# Patient Record
Sex: FEMALE | Race: WHITE | NOT HISPANIC OR LATINO | Employment: FULL TIME | URBAN - METROPOLITAN AREA
[De-identification: names, ages, dates, MRNs, and addresses within clinical notes are randomized per-mention and may not be internally consistent; named-entity substitution may affect disease eponyms.]

---

## 2017-11-08 LAB
EXTERNAL HIV SCREEN: NORMAL
HCV AB SER-ACNC: NEGATIVE

## 2017-12-15 ENCOUNTER — APPOINTMENT (OUTPATIENT)
Dept: PHYSICAL THERAPY | Facility: CLINIC | Age: 42
End: 2017-12-15
Payer: COMMERCIAL

## 2017-12-15 PROCEDURE — 97161 PT EVAL LOW COMPLEX 20 MIN: CPT

## 2017-12-15 PROCEDURE — G8991 OTHER PT/OT GOAL STATUS: HCPCS

## 2017-12-15 PROCEDURE — G8990 OTHER PT/OT CURRENT STATUS: HCPCS

## 2017-12-20 ENCOUNTER — APPOINTMENT (OUTPATIENT)
Dept: PHYSICAL THERAPY | Facility: CLINIC | Age: 42
End: 2017-12-20
Payer: COMMERCIAL

## 2017-12-20 PROCEDURE — 97140 MANUAL THERAPY 1/> REGIONS: CPT

## 2017-12-20 PROCEDURE — 97110 THERAPEUTIC EXERCISES: CPT

## 2017-12-22 ENCOUNTER — APPOINTMENT (OUTPATIENT)
Dept: PHYSICAL THERAPY | Facility: CLINIC | Age: 42
End: 2017-12-22
Payer: COMMERCIAL

## 2017-12-22 PROCEDURE — 97140 MANUAL THERAPY 1/> REGIONS: CPT

## 2017-12-22 PROCEDURE — 97110 THERAPEUTIC EXERCISES: CPT

## 2017-12-27 ENCOUNTER — APPOINTMENT (OUTPATIENT)
Dept: PHYSICAL THERAPY | Facility: CLINIC | Age: 42
End: 2017-12-27
Payer: COMMERCIAL

## 2017-12-27 PROCEDURE — 97110 THERAPEUTIC EXERCISES: CPT

## 2017-12-27 PROCEDURE — 97140 MANUAL THERAPY 1/> REGIONS: CPT

## 2017-12-28 ENCOUNTER — APPOINTMENT (OUTPATIENT)
Dept: PHYSICAL THERAPY | Facility: CLINIC | Age: 42
End: 2017-12-28
Payer: COMMERCIAL

## 2017-12-28 PROCEDURE — 97110 THERAPEUTIC EXERCISES: CPT

## 2017-12-28 PROCEDURE — 97140 MANUAL THERAPY 1/> REGIONS: CPT

## 2017-12-28 PROCEDURE — G8992 OTHER PT/OT  D/C STATUS: HCPCS

## 2017-12-28 PROCEDURE — G8991 OTHER PT/OT GOAL STATUS: HCPCS

## 2019-05-15 PROBLEM — G45.9 TIA (TRANSIENT ISCHEMIC ATTACK): Status: ACTIVE | Noted: 2017-04-12

## 2019-05-15 PROBLEM — Z86.73 H/O TIA (TRANSIENT ISCHEMIC ATTACK) AND STROKE: Status: ACTIVE | Noted: 2017-04-20

## 2019-05-15 PROBLEM — F41.9 ANXIETY: Status: ACTIVE | Noted: 2017-11-27

## 2019-05-16 RX ORDER — ATORVASTATIN CALCIUM 10 MG/1
5 TABLET, FILM COATED ORAL
COMMUNITY
Start: 2018-11-30 | End: 2019-05-17 | Stop reason: SDUPTHER

## 2019-05-16 RX ORDER — FERROUS SULFATE 325(65) MG
325 TABLET ORAL EVERY OTHER DAY
COMMUNITY

## 2019-05-16 RX ORDER — ACETAMINOPHEN 500 MG
500 TABLET ORAL EVERY 6 HOURS PRN
COMMUNITY
Start: 2017-04-27

## 2019-05-16 RX ORDER — MAGNESIUM GLUCONATE 30 MG(550)
550 TABLET ORAL DAILY
COMMUNITY

## 2019-05-16 RX ORDER — CHLORAL HYDRATE 500 MG
1000 CAPSULE ORAL DAILY
COMMUNITY

## 2019-05-16 RX ORDER — ANTACID TABLETS 648 MG/1
648 TABLET, CHEWABLE ORAL DAILY
COMMUNITY

## 2019-05-16 RX ORDER — DICYCLOMINE HCL 20 MG
20 TABLET ORAL EVERY 6 HOURS PRN
COMMUNITY
Start: 2018-05-18 | End: 2022-01-14

## 2019-05-16 RX ORDER — LANOLIN ALCOHOL/MO/W.PET/CERES
1000 CREAM (GRAM) TOPICAL DAILY
COMMUNITY
End: 2019-11-22

## 2019-05-16 RX ORDER — CETIRIZINE HYDROCHLORIDE 10 MG/1
10 TABLET ORAL DAILY PRN
COMMUNITY

## 2019-05-16 RX ORDER — ASCORBIC ACID 500 MG
500 TABLET ORAL DAILY
COMMUNITY

## 2019-05-16 RX ORDER — MULTIVITAMIN WITH FOLIC ACID 400 MCG
1 TABLET ORAL DAILY
COMMUNITY

## 2019-05-17 ENCOUNTER — OFFICE VISIT (OUTPATIENT)
Dept: FAMILY MEDICINE CLINIC | Facility: CLINIC | Age: 44
End: 2019-05-17
Payer: COMMERCIAL

## 2019-05-17 VITALS
DIASTOLIC BLOOD PRESSURE: 82 MMHG | OXYGEN SATURATION: 99 % | WEIGHT: 157 LBS | SYSTOLIC BLOOD PRESSURE: 128 MMHG | HEART RATE: 92 BPM | HEIGHT: 64 IN | BODY MASS INDEX: 26.8 KG/M2 | RESPIRATION RATE: 18 BRPM | TEMPERATURE: 98.5 F

## 2019-05-17 DIAGNOSIS — Z86.73 H/O TIA (TRANSIENT ISCHEMIC ATTACK) AND STROKE: ICD-10-CM

## 2019-05-17 DIAGNOSIS — F41.9 ANXIETY: ICD-10-CM

## 2019-05-17 DIAGNOSIS — J30.9 ALLERGIC RHINITIS, UNSPECIFIED SEASONALITY, UNSPECIFIED TRIGGER: ICD-10-CM

## 2019-05-17 DIAGNOSIS — T45.8X5A: ICD-10-CM

## 2019-05-17 DIAGNOSIS — M54.2 NECK PAIN: ICD-10-CM

## 2019-05-17 DIAGNOSIS — R74.8 ELEVATED VITAMIN B12 LEVEL: ICD-10-CM

## 2019-05-17 DIAGNOSIS — Z13.1 DIABETES MELLITUS SCREENING: ICD-10-CM

## 2019-05-17 DIAGNOSIS — M79.10 MYALGIA: ICD-10-CM

## 2019-05-17 DIAGNOSIS — L60.8 DISCOLORED NAILS: Primary | ICD-10-CM

## 2019-05-17 PROBLEM — G45.9 TIA (TRANSIENT ISCHEMIC ATTACK): Status: RESOLVED | Noted: 2017-04-12 | Resolved: 2019-05-17

## 2019-05-17 PROCEDURE — 1036F TOBACCO NON-USER: CPT | Performed by: FAMILY MEDICINE

## 2019-05-17 PROCEDURE — 99214 OFFICE O/P EST MOD 30 MIN: CPT | Performed by: FAMILY MEDICINE

## 2019-05-17 PROCEDURE — 3008F BODY MASS INDEX DOCD: CPT | Performed by: FAMILY MEDICINE

## 2019-05-17 RX ORDER — ATORVASTATIN CALCIUM 10 MG/1
5 TABLET, FILM COATED ORAL
Qty: 90 TABLET | Refills: 1 | Status: SHIPPED | OUTPATIENT
Start: 2019-05-17 | End: 2019-11-22 | Stop reason: SDUPTHER

## 2019-05-31 ENCOUNTER — LAB (OUTPATIENT)
Dept: LAB | Facility: CLINIC | Age: 44
End: 2019-05-31
Payer: COMMERCIAL

## 2019-05-31 DIAGNOSIS — F41.9 ANXIETY: ICD-10-CM

## 2019-05-31 DIAGNOSIS — Z86.73 H/O TIA (TRANSIENT ISCHEMIC ATTACK) AND STROKE: ICD-10-CM

## 2019-05-31 DIAGNOSIS — L60.8 DISCOLORED NAILS: ICD-10-CM

## 2019-05-31 LAB
ALBUMIN SERPL BCP-MCNC: 4.1 G/DL (ref 3.5–5)
ALP SERPL-CCNC: 48 U/L (ref 46–116)
ALT SERPL W P-5'-P-CCNC: 47 U/L (ref 12–78)
ANION GAP SERPL CALCULATED.3IONS-SCNC: 8 MMOL/L (ref 4–13)
AST SERPL W P-5'-P-CCNC: 36 U/L (ref 5–45)
BASOPHILS # BLD AUTO: 0.05 THOUSANDS/ΜL (ref 0–0.1)
BASOPHILS NFR BLD AUTO: 1 % (ref 0–1)
BILIRUB SERPL-MCNC: 0.65 MG/DL (ref 0.2–1)
BUN SERPL-MCNC: 13 MG/DL (ref 5–25)
CALCIUM SERPL-MCNC: 8.5 MG/DL (ref 8.3–10.1)
CHLORIDE SERPL-SCNC: 106 MMOL/L (ref 100–108)
CHOLEST SERPL-MCNC: 184 MG/DL (ref 50–200)
CO2 SERPL-SCNC: 26 MMOL/L (ref 21–32)
CREAT SERPL-MCNC: 0.76 MG/DL (ref 0.6–1.3)
EOSINOPHIL # BLD AUTO: 0.07 THOUSAND/ΜL (ref 0–0.61)
EOSINOPHIL NFR BLD AUTO: 1 % (ref 0–6)
ERYTHROCYTE [DISTWIDTH] IN BLOOD BY AUTOMATED COUNT: 11.9 % (ref 11.6–15.1)
FOLATE SERPL-MCNC: >20 NG/ML (ref 3.1–17.5)
GFR SERPL CREATININE-BSD FRML MDRD: 95 ML/MIN/1.73SQ M
GLUCOSE P FAST SERPL-MCNC: 97 MG/DL (ref 65–99)
HCT VFR BLD AUTO: 37.1 % (ref 34.8–46.1)
HDLC SERPL-MCNC: 87 MG/DL (ref 40–60)
HGB BLD-MCNC: 12 G/DL (ref 11.5–15.4)
IMM GRANULOCYTES # BLD AUTO: 0.03 THOUSAND/UL (ref 0–0.2)
IMM GRANULOCYTES NFR BLD AUTO: 1 % (ref 0–2)
LDLC SERPL CALC-MCNC: 82 MG/DL (ref 0–100)
LYMPHOCYTES # BLD AUTO: 1.26 THOUSANDS/ΜL (ref 0.6–4.47)
LYMPHOCYTES NFR BLD AUTO: 25 % (ref 14–44)
MCH RBC QN AUTO: 30.6 PG (ref 26.8–34.3)
MCHC RBC AUTO-ENTMCNC: 32.3 G/DL (ref 31.4–37.4)
MCV RBC AUTO: 95 FL (ref 82–98)
MONOCYTES # BLD AUTO: 0.48 THOUSAND/ΜL (ref 0.17–1.22)
MONOCYTES NFR BLD AUTO: 10 % (ref 4–12)
NEUTROPHILS # BLD AUTO: 3.16 THOUSANDS/ΜL (ref 1.85–7.62)
NEUTS SEG NFR BLD AUTO: 62 % (ref 43–75)
NONHDLC SERPL-MCNC: 97 MG/DL
NRBC BLD AUTO-RTO: 0 /100 WBCS
PLATELET # BLD AUTO: 278 THOUSANDS/UL (ref 149–390)
PMV BLD AUTO: 10.9 FL (ref 8.9–12.7)
POTASSIUM SERPL-SCNC: 4.2 MMOL/L (ref 3.5–5.3)
PROT SERPL-MCNC: 7.3 G/DL (ref 6.4–8.2)
RBC # BLD AUTO: 3.92 MILLION/UL (ref 3.81–5.12)
SODIUM SERPL-SCNC: 140 MMOL/L (ref 136–145)
TRIGL SERPL-MCNC: 73 MG/DL
TSH SERPL DL<=0.05 MIU/L-ACNC: 1.34 UIU/ML (ref 0.36–3.74)
VIT B12 SERPL-MCNC: 1864 PG/ML (ref 100–900)
WBC # BLD AUTO: 5.05 THOUSAND/UL (ref 4.31–10.16)

## 2019-05-31 PROCEDURE — 82746 ASSAY OF FOLIC ACID SERUM: CPT

## 2019-05-31 PROCEDURE — 82607 VITAMIN B-12: CPT

## 2019-05-31 PROCEDURE — 80061 LIPID PANEL: CPT

## 2019-05-31 PROCEDURE — 36415 COLL VENOUS BLD VENIPUNCTURE: CPT

## 2019-05-31 PROCEDURE — 84443 ASSAY THYROID STIM HORMONE: CPT

## 2019-05-31 PROCEDURE — 85025 COMPLETE CBC W/AUTO DIFF WBC: CPT

## 2019-05-31 PROCEDURE — 80053 COMPREHEN METABOLIC PANEL: CPT

## 2019-06-03 ENCOUNTER — TELEPHONE (OUTPATIENT)
Dept: FAMILY MEDICINE CLINIC | Facility: CLINIC | Age: 44
End: 2019-06-03

## 2019-11-06 ENCOUNTER — TELEPHONE (OUTPATIENT)
Dept: FAMILY MEDICINE CLINIC | Facility: CLINIC | Age: 44
End: 2019-11-06

## 2019-11-06 NOTE — TELEPHONE ENCOUNTER
Received text message from patient inquiring about holding Aspirin 81mg daily prior to shoulder cyst removal by Derm  I advised patient by text to contact Neuro Dr Shahzad Hathaway for his guidance

## 2019-11-15 ENCOUNTER — LAB (OUTPATIENT)
Dept: LAB | Facility: CLINIC | Age: 44
End: 2019-11-15
Payer: COMMERCIAL

## 2019-11-15 DIAGNOSIS — F41.9 ANXIETY: ICD-10-CM

## 2019-11-15 DIAGNOSIS — Z86.73 H/O TIA (TRANSIENT ISCHEMIC ATTACK) AND STROKE: ICD-10-CM

## 2019-11-15 DIAGNOSIS — T45.8X5A: ICD-10-CM

## 2019-11-15 DIAGNOSIS — M79.10 MYALGIA: ICD-10-CM

## 2019-11-15 DIAGNOSIS — Z13.1 DIABETES MELLITUS SCREENING: ICD-10-CM

## 2019-11-15 DIAGNOSIS — R74.8 ELEVATED VITAMIN B12 LEVEL: ICD-10-CM

## 2019-11-15 LAB
25(OH)D3 SERPL-MCNC: 75.9 NG/ML (ref 30–100)
ALBUMIN SERPL BCP-MCNC: 4.3 G/DL (ref 3.5–5)
ALP SERPL-CCNC: 35 U/L (ref 46–116)
ALT SERPL W P-5'-P-CCNC: 26 U/L (ref 12–78)
ANION GAP SERPL CALCULATED.3IONS-SCNC: 8 MMOL/L (ref 4–13)
AST SERPL W P-5'-P-CCNC: 14 U/L (ref 5–45)
BASOPHILS # BLD AUTO: 0.04 THOUSANDS/ΜL (ref 0–0.1)
BASOPHILS NFR BLD AUTO: 1 % (ref 0–1)
BILIRUB SERPL-MCNC: 0.43 MG/DL (ref 0.2–1)
BUN SERPL-MCNC: 16 MG/DL (ref 5–25)
CALCIUM SERPL-MCNC: 9.1 MG/DL (ref 8.3–10.1)
CHLORIDE SERPL-SCNC: 109 MMOL/L (ref 100–108)
CHOLEST SERPL-MCNC: 163 MG/DL (ref 50–200)
CO2 SERPL-SCNC: 23 MMOL/L (ref 21–32)
CREAT SERPL-MCNC: 0.76 MG/DL (ref 0.6–1.3)
EOSINOPHIL # BLD AUTO: 0.07 THOUSAND/ΜL (ref 0–0.61)
EOSINOPHIL NFR BLD AUTO: 2 % (ref 0–6)
ERYTHROCYTE [DISTWIDTH] IN BLOOD BY AUTOMATED COUNT: 12 % (ref 11.6–15.1)
EST. AVERAGE GLUCOSE BLD GHB EST-MCNC: 88 MG/DL
FOLATE SERPL-MCNC: 12 NG/ML (ref 3.1–17.5)
GFR SERPL CREATININE-BSD FRML MDRD: 94 ML/MIN/1.73SQ M
GLUCOSE P FAST SERPL-MCNC: 94 MG/DL (ref 65–99)
HBA1C MFR BLD: 4.7 % (ref 4.2–6.3)
HCT VFR BLD AUTO: 37.4 % (ref 34.8–46.1)
HDLC SERPL-MCNC: 69 MG/DL
HGB BLD-MCNC: 11.8 G/DL (ref 11.5–15.4)
IMM GRANULOCYTES # BLD AUTO: 0.01 THOUSAND/UL (ref 0–0.2)
IMM GRANULOCYTES NFR BLD AUTO: 0 % (ref 0–2)
LDLC SERPL CALC-MCNC: 83 MG/DL (ref 0–100)
LYMPHOCYTES # BLD AUTO: 1.6 THOUSANDS/ΜL (ref 0.6–4.47)
LYMPHOCYTES NFR BLD AUTO: 34 % (ref 14–44)
MCH RBC QN AUTO: 30.4 PG (ref 26.8–34.3)
MCHC RBC AUTO-ENTMCNC: 31.6 G/DL (ref 31.4–37.4)
MCV RBC AUTO: 96 FL (ref 82–98)
MONOCYTES # BLD AUTO: 0.45 THOUSAND/ΜL (ref 0.17–1.22)
MONOCYTES NFR BLD AUTO: 10 % (ref 4–12)
NEUTROPHILS # BLD AUTO: 2.52 THOUSANDS/ΜL (ref 1.85–7.62)
NEUTS SEG NFR BLD AUTO: 53 % (ref 43–75)
NONHDLC SERPL-MCNC: 94 MG/DL
NRBC BLD AUTO-RTO: 0 /100 WBCS
PLATELET # BLD AUTO: 244 THOUSANDS/UL (ref 149–390)
PMV BLD AUTO: 11 FL (ref 8.9–12.7)
POTASSIUM SERPL-SCNC: 4.4 MMOL/L (ref 3.5–5.3)
PROT SERPL-MCNC: 7.5 G/DL (ref 6.4–8.2)
RBC # BLD AUTO: 3.88 MILLION/UL (ref 3.81–5.12)
SODIUM SERPL-SCNC: 140 MMOL/L (ref 136–145)
TRIGL SERPL-MCNC: 54 MG/DL
TSH SERPL DL<=0.05 MIU/L-ACNC: 1.37 UIU/ML (ref 0.36–3.74)
VIT B12 SERPL-MCNC: 515 PG/ML (ref 100–900)
WBC # BLD AUTO: 4.69 THOUSAND/UL (ref 4.31–10.16)

## 2019-11-15 PROCEDURE — 82607 VITAMIN B-12: CPT

## 2019-11-15 PROCEDURE — 80061 LIPID PANEL: CPT

## 2019-11-15 PROCEDURE — 83036 HEMOGLOBIN GLYCOSYLATED A1C: CPT

## 2019-11-15 PROCEDURE — 84443 ASSAY THYROID STIM HORMONE: CPT

## 2019-11-15 PROCEDURE — 80053 COMPREHEN METABOLIC PANEL: CPT

## 2019-11-15 PROCEDURE — 36415 COLL VENOUS BLD VENIPUNCTURE: CPT

## 2019-11-15 PROCEDURE — 82746 ASSAY OF FOLIC ACID SERUM: CPT

## 2019-11-15 PROCEDURE — 85025 COMPLETE CBC W/AUTO DIFF WBC: CPT

## 2019-11-15 PROCEDURE — 82306 VITAMIN D 25 HYDROXY: CPT

## 2019-11-21 NOTE — PROGRESS NOTES
Assessment/Plan:  Problem List Items Addressed This Visit        Respiratory    AR (allergic rhinitis)     Stable on Zyrtec PRN  Other    Anxiety     Stable on CBD oil  Start Cymbalta 60mg daily for fibromyalgia  Relevant Orders    CBC and differential    Comprehensive metabolic panel    Neck pain     Worsening  Continue Voltaren Gel PRN if not taking NSAIDs  Relevant Orders    Ambulatory referral to Physical Therapy    Ambulatory referral to Chiropractic    Likely due to OA  H/O TIA (transient ischemic attack) and stroke     Stable  Continue ASA, statin  Patient declines taking higher statin dose than Lipitor 10mg 1/2 pill QHS  Recommend lifestyle modifications  Relevant Medications    atorvastatin (LIPITOR) 10 mg tablet    Other Relevant Orders    Lipid panel    LDL cholesterol, direct    Fibromyalgia - Primary     New Dx 11/22/19  +12/18 tender points  Start Cymbalta 60mg daily for fibromyalgia  Patient declines aqua therapy  Recommend lifestyle modifications             Relevant Medications    DULoxetine (CYMBALTA) 30 mg delayed release capsule    DULoxetine (CYMBALTA) 60 mg delayed release capsule    Other Relevant Orders    Ambulatory referral to Physical Therapy    Ambulatory referral to Chiropractic    TRAV Screen w/ Reflex to Titer/Pattern    C-reactive protein    Cyclic citrul peptide antibody, IgG    RF Screen w/ Reflex to Titer    Uric acid    Lyme Antibody Profile with reflex to WB    Comprehensive metabolic panel    Magnesium      Other Visit Diagnoses     Acute thoracic back pain, unspecified back pain laterality        Relevant Orders    XR spine thoracic 3 vw    XR ribs bilateral 4+ vw w pa chest    POCT urine HCG (Completed)    Ambulatory referral to Physical Therapy    Ambulatory referral to Chiropractic    TRAV Screen w/ Reflex to Titer/Pattern    C-reactive protein    Cyclic citrul peptide antibody, IgG    RF Screen w/ Reflex to Titer Uric acid    Lyme Antibody Profile with reflex to WB    Likely due to Fibromyalgia  See above  Former smoker        Relevant Orders    XR spine thoracic 3 vw    XR ribs bilateral 4+ vw w pa chest    Rib pain on right side        Relevant Orders    XR spine thoracic 3 vw    XR ribs bilateral 4+ vw w pa chest    Ambulatory referral to Physical Therapy    Ambulatory referral to Chiropractic    TRAV Screen w/ Reflex to Titer/Pattern    C-reactive protein    Cyclic citrul peptide antibody, IgG    RF Screen w/ Reflex to Titer    Uric acid    Lyme Antibody Profile with reflex to WB      Likely due to Fibromyalgia  See above  Costochondritis        Relevant Orders    XR spine thoracic 3 vw    XR ribs bilateral 4+ vw w pa chest    Ambulatory referral to Physical Therapy    Ambulatory referral to Chiropractic    TRAV Screen w/ Reflex to Titer/Pattern    C-reactive protein    Cyclic citrul peptide antibody, IgG    RF Screen w/ Reflex to Titer    Uric acid    Lyme Antibody Profile with reflex to WB    Continue NSAIDS / heating pad PRN  Overweight        Relevant Orders    POCT urine HCG (Completed)    TSH, 3rd generation with Free T4 reflex    Arthralgia, unspecified joint        Relevant Orders    TRAV Screen w/ Reflex to Titer/Pattern    C-reactive protein    Cyclic citrul peptide antibody, IgG    RF Screen w/ Reflex to Titer    Uric acid    Lyme Antibody Profile with reflex to WB    Myalgia        Relevant Orders    TRAV Screen w/ Reflex to Titer/Pattern    C-reactive protein    Cyclic citrul peptide antibody, IgG    RF Screen w/ Reflex to Titer    Uric acid    Lyme Antibody Profile with reflex to WB    Encounter for screening mammogram for breast cancer        Relevant Orders    Mammo screening bilateral w 3d & cad           Return in about 6 weeks (around 1/3/2020) for F/U FM, Labs;  6mo- Anxiety, CVA, TIA, s/p PFO, AR, Labs        Future Appointments   Date Time Provider Sari Corona   1/3/2020 11:20 AM Brit Bazan DO [facility]   5/22/2020 11:20 AM Brit Bazan DO [facility]        Subjective:     Jennifer Dickens is a 53 y o  female who presents today for a follow-up on her chronic medical conditions  HPI:  Chief Complaint   Patient presents with    Follow-up     6 month f/u,     Muscle Pain     has been having pain in muscles over the last 3 weeks  has been doing yoga and believes she strecthed too much      -- Above per clinical staff and reviewed  --      HPI      Today:      Return in about 6 months (around 11/17/2019) for 6mo- Anxiety, CVA, TIA, s/p PFO, Vaping, AR, Labs   6mo OV     Discolored Toenails - Symptoms x 9 months, unchanged  Left 4th toe, Right 3rd and 4th finger with hyperpigmented vertical stripe  She has been wearing acrylic nails for a long time  Denies itching, pain, bleeding  Derm thinks benign hyperpigmentation  Next appt 4/20  Arthralgias / Myalgias - Symptoms x 3-4 weeks of sternal and thoracic pain after doing hot yoga x 2, wore posture correction for 6 hours  Crampy pain  Has intermittent B/L ankle stiffness with cold weather, improved c heat and activity  Chest pain is improving with time  Neck and thoracic back pain improve with heat  She feels clicking in her spine  Denies trauma  Denies joint redness, swelling, warmth in joint  Using Motrin 8-10 pills daily  and Tylenol 2 tabs 2-3 times per day - less helpful, CBD cream helpful  Feels her posture is bad          Overweight - Watching diet with Weight [employer] blue plan since 10/19 - tintermittent fasting due to work schedule  +Exercising - stretching ribs for a few days  Previously running, walking - For 20 minutes, 2-3 times per week       Headaches / Burning Neck Pain - [place], s/p PT  Resolved  Intermittent symptoms of neck pain c weather change  Using Voltaren Gel c benefit when Tylenol / Motrin unhelpful  HA improved since PFO repaired        Anxiety - Self-weaned Zoloft 50mg QD 8/18 due to gaining weight  She is taking CBD oil instead since 8/18  Mood is stable, sleep is improved  No SI/HI/AH/VH  Feels safe at home  Drinking 12oz coffee daily  PHQ-9 Depression Screening    PHQ-9:    Frequency of the following problems over the past two weeks:       Little interest or pleasure in doing things:  0 - not at all  Feeling down, depressed, or hopeless:  0 - not at all  PHQ-2 Score:  0         ALYCE-7 Flowsheet Screening      Most Recent Value   Over the last two weeks, how often have you been bothered by the following problems? Feeling nervous, anxious, or on edge  0   Not being able to stop or control worrying  0   Worrying too much about different things  0   Trouble relaxing   0   Being so restless that it's hard to sit still  0   Becoming easily annoyed or irritable   0   Feeling afraid as if something awful might happen  0   How difficult have these problems made it for you to do your work, take care of things at home, or get along with other people? Not difficult at all   ALYCE Score   0          Constipation - Chronic  Has BM 2-3 times per week  No longer has nugget stools  Sometimes has stomach twisting  Uses Bentyl PRN rarely  Uses Laxatives - OTC store brand PRN - 3 times per month  She states stool softeners not helpful       Had Tdap at [facility] 2012 - med recs requested, but not available         Antiphospholipid Syndrome / CVA 4/12/17, TIA 7/19/17 -   +CVA on MRI   +ELIZABETH c Biatrial PFO   Neuro started ASA 81mg and Lipitor 40mg QHS   Thrombotic risk panel showed - Cardiolipin IgM positive, which Neuro believes is a transient finding   Neuro plans to repeat Cardiolipin IgM 7/17 - repeated 9/7/17 and again positive   Next Neuro appt 5/19 / PRN   s/p PFO closure 4/25/17 c Cardio Dr Tyshawn Miranda   s/p thrombolytic  at [facility] 2/4/18 c Dr Mili Broderick - Advises ASA 81mg QD and possible Holter / Loop - pt will discuss c Neuro   Neuro Dr Osei Arita declined recommendation per patient        TIA 4/19/17- Pt returned to ER with funny feeling in head and LUE (not RUE) numbness   MRI revealed no evidence of CVA   Neuro and Cardio advised Plavix for at least 6 months and ASA 81mg for life   I/P team decreased Lipitor 40mg 1/2 pill QHS as pt was experiencing hair loss        APS - s/p Neuro input   Currently on ASA, D/C Plavix              AR - Stable on Zyrtec PRN  She quit Vaping       Reviewed:   Labs 11/28/18, Ortho 12/11/17, Cardio 11/8/17, Rheum 10/16/17, Heme/Onc 10/4/17, Neuro 5/31/18       Sees Gyn Dr Prema Landis yearly   Next appt 11/19   Due for Mammogram  Baljinder Guerrero recs requested again - Last Pap 2015         The following portions of the patient's history were reviewed and updated as appropriate: allergies, current medications, past family history, past medical history, past social history, past surgical history and problem list       Review of Systems   Constitutional: Negative for appetite change, chills, diaphoresis, fatigue and fever  Respiratory: Negative for chest tightness and shortness of breath  Cardiovascular: Negative for chest pain  Gastrointestinal: Negative for abdominal pain, blood in stool, diarrhea, nausea and vomiting  Genitourinary: Negative for dysuria  Musculoskeletal: Positive for arthralgias and back pain          Current Outpatient Medications   Medication Sig Dispense Refill    acetaminophen (TYLENOL) 500 mg tablet Take 500 mg by mouth every 6 (six) hours as needed      ascorbic acid (VITAMIN C) 500 mg tablet Take 500 mg by mouth daily      aspirin 81 MG tablet Take 81 mg by mouth daily      atorvastatin (LIPITOR) 10 mg tablet Take 0 5 tablets (5 mg total) by mouth daily at bedtime 90 tablet 1    Calcium Carbonate Antacid 648 MG TABS Take 648 mg by mouth daily      Cholecalciferol (D 1000) 1000 units capsule Take 2,000 Units by mouth daily      Coenzyme Q10 300 MG CAPS Take 300 mg by mouth daily      ferrous sulfate 325 (65 Fe) mg tablet Take 325 mg by mouth every other day       Magnesium Gluconate 550 MG TABS Take 550 mg by mouth daily       Multiple Vitamin (TAB-A-CORWIN) TABS Take 1 tablet by mouth daily      NON FORMULARY Take 4 drops by mouth 2 (two) times a day       Omega-3 Fatty Acids (FISH OIL) 1,000 mg Take 1,000 mg by mouth daily      cetirizine (ZYRTEC ALLERGY) 10 mg tablet Take 10 mg by mouth daily as needed       diclofenac sodium (VOLTAREN) 1 % Apply 2 g topically 4 (four) times a day (Patient not taking: Reported on 11/22/2019) 100 g 1    dicyclomine (BENTYL) 20 mg tablet Take 20 mg by mouth every 6 (six) hours as needed      DULoxetine (CYMBALTA) 30 mg delayed release capsule Take 1 capsule (30 mg total) by mouth daily for 7 days 7 capsule 0    DULoxetine (CYMBALTA) 60 mg delayed release capsule Take 1 capsule (60 mg total) by mouth daily Start after taking 30mg daily x 1 week  30 capsule 1     No current facility-administered medications for this visit  Objective:  /70   Pulse 73   Temp 98 5 °F (36 9 °C) (Tympanic)   Resp 16   Ht 5' 4" (1 626 m)   Wt 66 6 kg (146 lb 12 8 oz)   LMP 10/29/2019   SpO2 99%   Breastfeeding? No   BMI 25 20 kg/m²    Wt Readings from Last 3 Encounters:   11/22/19 66 6 kg (146 lb 12 8 oz)   05/17/19 71 2 kg (157 lb)      BP Readings from Last 3 Encounters:   11/22/19 122/70   05/17/19 128/82          Physical Exam   Constitutional: She is oriented to person, place, and time  She appears well-developed and well-nourished  HENT:   Head: Normocephalic and atraumatic  Eyes: Conjunctivae are normal    Neck: Neck supple  No thyromegaly present  Cardiovascular: Normal rate, regular rhythm, normal heart sounds and intact distal pulses  Pulmonary/Chest: Effort normal and breath sounds normal  She exhibits tenderness (Sternal)  Abdominal: Soft  Bowel sounds are normal  She exhibits no distension and no mass  There is no tenderness   There is no rebound and no guarding  Musculoskeletal: She exhibits tenderness (12/18 Fibromyalgia Tender Points; Paraspinal Thoracic Spine, Rigth anterior 10 Rib - no step off sign)  She exhibits no edema  Decreased AROM c B/L SB and Rotation  Neurological: She is alert and oriented to person, place, and time  Psychiatric: She has a normal mood and affect  Nursing note and vitals reviewed  Lab Results:      Lab Results   Component Value Date    WBC 4 69 11/15/2019    HGB 11 8 11/15/2019    HCT 37 4 11/15/2019     11/15/2019    TRIG 54 11/15/2019    HDL 69 11/15/2019    ALT 26 11/15/2019    AST 14 11/15/2019    K 4 4 11/15/2019     (H) 11/15/2019    CREATININE 0 76 11/15/2019    BUN 16 11/15/2019    CO2 23 11/15/2019    GLUF 94 11/15/2019    HGBA1C 4 7 11/15/2019     No results found for: URICACID  Invalid input(s): BASENAME Vitamin D    No results found  POCT Labs      BMI Counseling: Body mass index is 25 2 kg/m²  The BMI is above normal  Nutrition recommendations include encouraging healthy choices of fruits and vegetables  Exercise recommendations include exercising 3-5 times per week  BMI Counseling: Body mass index is 25 2 kg/m²  The BMI is above normal  Nutrition recommendations include decreasing overall calorie intake  Exercise recommendations include exercising 3-5 times per week

## 2019-11-22 ENCOUNTER — LAB (OUTPATIENT)
Dept: LAB | Facility: CLINIC | Age: 44
End: 2019-11-22
Payer: COMMERCIAL

## 2019-11-22 ENCOUNTER — HOSPITAL ENCOUNTER (OUTPATIENT)
Dept: RADIOLOGY | Facility: HOSPITAL | Age: 44
Discharge: HOME | End: 2019-11-22
Attending: FAMILY MEDICINE
Payer: COMMERCIAL

## 2019-11-22 ENCOUNTER — OFFICE VISIT (OUTPATIENT)
Dept: FAMILY MEDICINE CLINIC | Facility: CLINIC | Age: 44
End: 2019-11-22
Payer: COMMERCIAL

## 2019-11-22 VITALS
BODY MASS INDEX: 25.06 KG/M2 | HEIGHT: 64 IN | SYSTOLIC BLOOD PRESSURE: 122 MMHG | RESPIRATION RATE: 16 BRPM | OXYGEN SATURATION: 99 % | HEART RATE: 73 BPM | DIASTOLIC BLOOD PRESSURE: 70 MMHG | WEIGHT: 146.8 LBS | TEMPERATURE: 98.5 F

## 2019-11-22 DIAGNOSIS — M25.50 ARTHRALGIA, UNSPECIFIED JOINT: ICD-10-CM

## 2019-11-22 DIAGNOSIS — Z86.73 H/O TIA (TRANSIENT ISCHEMIC ATTACK) AND STROKE: ICD-10-CM

## 2019-11-22 DIAGNOSIS — J30.9 ALLERGIC RHINITIS, UNSPECIFIED SEASONALITY, UNSPECIFIED TRIGGER: ICD-10-CM

## 2019-11-22 DIAGNOSIS — M94.0 COSTOCHONDRITIS: ICD-10-CM

## 2019-11-22 DIAGNOSIS — R07.81 RIB PAIN ON RIGHT SIDE: ICD-10-CM

## 2019-11-22 DIAGNOSIS — F41.9 ANXIETY: ICD-10-CM

## 2019-11-22 DIAGNOSIS — Z87.891 FORMER SMOKER: ICD-10-CM

## 2019-11-22 DIAGNOSIS — M54.6 ACUTE THORACIC BACK PAIN, UNSPECIFIED BACK PAIN LATERALITY: ICD-10-CM

## 2019-11-22 DIAGNOSIS — M79.10 MYALGIA: ICD-10-CM

## 2019-11-22 DIAGNOSIS — Z12.31 ENCOUNTER FOR SCREENING MAMMOGRAM FOR BREAST CANCER: ICD-10-CM

## 2019-11-22 DIAGNOSIS — E66.3 OVERWEIGHT: ICD-10-CM

## 2019-11-22 DIAGNOSIS — M79.7 FIBROMYALGIA: ICD-10-CM

## 2019-11-22 DIAGNOSIS — M54.2 NECK PAIN: ICD-10-CM

## 2019-11-22 DIAGNOSIS — M79.7 FIBROMYALGIA: Primary | ICD-10-CM

## 2019-11-22 LAB
CRP SERPL QL: <3 MG/L
SL AMB POCT URINE HCG: NEGATIVE
URATE SERPL-MCNC: 3.6 MG/DL (ref 2–6.8)

## 2019-11-22 PROCEDURE — 86430 RHEUMATOID FACTOR TEST QUAL: CPT

## 2019-11-22 PROCEDURE — 72072 X-RAY EXAM THORAC SPINE 3VWS: CPT

## 2019-11-22 PROCEDURE — 99214 OFFICE O/P EST MOD 30 MIN: CPT | Performed by: FAMILY MEDICINE

## 2019-11-22 PROCEDURE — 86200 CCP ANTIBODY: CPT

## 2019-11-22 PROCEDURE — 71111 X-RAY EXAM RIBS/CHEST4/> VWS: CPT

## 2019-11-22 PROCEDURE — 86038 ANTINUCLEAR ANTIBODIES: CPT

## 2019-11-22 PROCEDURE — 86618 LYME DISEASE ANTIBODY: CPT

## 2019-11-22 PROCEDURE — 81025 URINE PREGNANCY TEST: CPT | Performed by: FAMILY MEDICINE

## 2019-11-22 PROCEDURE — 86140 C-REACTIVE PROTEIN: CPT

## 2019-11-22 PROCEDURE — 36415 COLL VENOUS BLD VENIPUNCTURE: CPT

## 2019-11-22 PROCEDURE — 1036F TOBACCO NON-USER: CPT | Performed by: FAMILY MEDICINE

## 2019-11-22 PROCEDURE — 84550 ASSAY OF BLOOD/URIC ACID: CPT

## 2019-11-22 RX ORDER — DULOXETIN HYDROCHLORIDE 60 MG/1
60 CAPSULE, DELAYED RELEASE ORAL DAILY
Qty: 30 CAPSULE | Refills: 1 | Status: SHIPPED | OUTPATIENT
Start: 2019-11-22 | End: 2019-12-19 | Stop reason: SDUPTHER

## 2019-11-22 RX ORDER — ATORVASTATIN CALCIUM 10 MG/1
5 TABLET, FILM COATED ORAL
Qty: 90 TABLET | Refills: 1 | Status: SHIPPED | OUTPATIENT
Start: 2019-11-22 | End: 2019-12-18 | Stop reason: SDUPTHER

## 2019-11-22 RX ORDER — ATORVASTATIN CALCIUM 10 MG/1
5 TABLET, FILM COATED ORAL
Qty: 90 TABLET | Refills: 1 | Status: SHIPPED | OUTPATIENT
Start: 2019-11-22 | End: 2019-11-22 | Stop reason: SDUPTHER

## 2019-11-22 RX ORDER — DULOXETIN HYDROCHLORIDE 30 MG/1
30 CAPSULE, DELAYED RELEASE ORAL DAILY
Qty: 7 CAPSULE | Refills: 0 | Status: SHIPPED | OUTPATIENT
Start: 2019-11-22 | End: 2020-01-10 | Stop reason: ALTCHOICE

## 2019-11-22 NOTE — PATIENT INSTRUCTIONS
Call Neuro or Heme/Onc RE: holding aspirin for Derm procedure  Weight Management   AMBULATORY CARE:   Why it is important to manage your weight:  Being overweight increases your risk of health conditions such as heart disease, high blood pressure, type 2 diabetes, and certain types of cancer  It can also increase your risk for osteoarthritis, sleep apnea, and other respiratory problems  Aim for a slow, steady weight loss  Even a small amount of weight loss can lower your risk of health problems  How to lose weight safely:  A safe and healthy way to lose weight is to eat fewer calories and get regular exercise  You can lose up about 1 pound a week by decreasing the number of calories you eat by 500 calories each day  You can decrease calories by eating smaller portion sizes or by cutting out high-calorie foods  Read labels to find out how many calories are in the foods you eat  You can also burn calories with exercise such as walking, swimming, or biking  You will be more likely to keep weight off if you make these changes part of your lifestyle  Healthy meal plan for weight management:  A healthy meal plan includes a variety of foods, contains fewer calories, and helps you stay healthy  A healthy meal plan includes the following:  · Eat whole-grain foods more often  A healthy meal plan should contain fiber  Fiber is the part of grains, fruits, and vegetables that is not broken down by your body  Whole-grain foods are healthy and provide extra fiber in your diet  Some examples of whole-grain foods are whole-wheat breads and pastas, oatmeal, brown rice, and bulgur  · Eat a variety of vegetables every day  Include dark, leafy greens such as spinach, kale, krishna greens, and mustard greens  Eat yellow and orange vegetables such as carrots, sweet potatoes, and winter squash  · Eat a variety of fruits every day  Choose fresh or canned fruit (canned in its own juice or light syrup) instead of juice   Fruit juice has very little or no fiber  · Eat low-fat dairy foods  Drink fat-free (skim) milk or 1% milk  Eat fat-free yogurt and low-fat cottage cheese  Try low-fat cheeses such as mozzarella and other reduced-fat cheeses  · Choose meat and other protein foods that are low in fat  Choose beans or other legumes such as split peas or lentils  Choose fish, skinless poultry (chicken or turkey), or lean cuts of red meat (beef or pork)  Before you cook meat or poultry, cut off any visible fat  · Use less fat and oil  Try baking foods instead of frying them  Add less fat, such as margarine, sour cream, regular salad dressing and mayonnaise to foods  Eat fewer high-fat foods  Some examples of high-fat foods include french fries, doughnuts, ice cream, and cakes  · Eat fewer sweets  Limit foods and drinks that are high in sugar  This includes candy, cookies, regular soda, and sweetened drinks  Ways to decrease calories:   · Eat smaller portions  ¨ Use a small plate with smaller servings  ¨ Do not eat second helpings  ¨ When you eat at a restaurant, ask for a box and place half of your meal in the box before you eat  ¨ Share an entrée with someone else  · Replace high-calorie snacks with healthy, low-calorie snacks  ¨ Choose fresh fruit, vegetables, fat-free rice cakes, or air-popped popcorn instead of potato chips, nuts, or chocolate  ¨ Choose water or calorie-free drinks instead of soda or sweetened drinks  · Eat regular meals  Skipping meals can lead to overeating later in the day  Eat a healthy snack in place of a meal if you do not have time to eat a regular meal      · Do not shop for groceries when you are hungry  You may be more likely to make unhealthy food choices  Take a grocery list of healthy foods and shop after you have eaten  Exercise:  Exercise at least 30 minutes per day on most days of the week   Some examples of exercise include walking, biking, dancing, and swimming  You can also fit in more physical activity by taking the stairs instead of the elevator or parking farther away from stores  Ask your healthcare provider about the best exercise plan for you  Other things to consider as you try to lose weight:   · Be aware of situations that may give you the urge to overeat, such as eating while watching television  Find ways to avoid these situations  For example, read a book, go for a walk, or do crafts  · Meet with a weight loss support group or friends who are also trying to lose weight  This may help you stay motivated to continue working on your weight loss goals  © 2017 [employer] Information is for End User's use only and may not be sold, redistributed or otherwise used for commercial purposes  All illustrations and images included in CareNotes® are the copyrighted property of [employer]  or Spike Santiago  The above information is an  only  It is not intended as medical advice for individual conditions or treatments  Talk to your doctor, nurse or pharmacist before following any medical regimen to see if it is safe and effective for you  Low Fat Diet   AMBULATORY CARE:   A low-fat diet  is an eating plan that is low in total fat, unhealthy fat, and cholesterol  You may need to follow a low-fat diet if you have trouble digesting or absorbing fat  You may also need to follow this diet if you have high cholesterol  You can also lower your cholesterol by increasing the amount of fiber in your diet  Soluble fiber is a type of fiber that helps to decrease cholesterol levels  Different types of fat in food:   · Limit unhealthy fats  A diet that is high in cholesterol, saturated fat, and trans fat may cause unhealthy cholesterol levels  Unhealthy cholesterol levels increase your risk of heart disease  ¨ Cholesterol:  Limit intake of cholesterol to less than 200 mg per day   Cholesterol is found in meat, eggs, and dairy     ¨ Saturated fat:  Limit saturated fat to less than 7% of your total daily calories  Ask your dietitian how many calories you need each day  Saturated fat is found in butter, cheese, ice cream, whole milk, and palm oil  Saturated fat is also found in meat, such as beef, pork, chicken skin, and processed meats  Processed meats include sausage, hot dogs, and bologna  ¨ Trans fat:  Avoid trans fat as much as possible  Trans fat is used in fried and baked foods  Foods that say trans fat free on the label may still have up to 0 5 grams of trans fat per serving  · Include healthy fats  Replace foods that are high in saturated and trans fat with foods high in healthy fats  This may help to decrease high cholesterol levels  ¨ Monounsaturated fats: These are found in avocados, nuts, and vegetable oils, such as olive, canola, and sunflower oil  ¨ Polyunsaturated fats: These can be found in vegetable oils, such as soybean or corn oil  Omega-3 fats can help to decrease the risk of heart disease  Omega-3 fats are found in fish, such as salmon, herring, trout, and tuna  Omega-3 fats can also be found in plant foods, such as walnuts, flaxseed, soybeans, and canola oil    Foods to limit or avoid:   · Grains:      ¨ Snacks that are made with partially hydrogenated oils, such as chips, regular crackers, and butter-flavored popcorn    ¨ High-fat baked goods, such as biscuits, croissants, doughnuts, pies, cookies, and pastries    · Dairy:      ¨ Whole milk, 2% milk, and yogurt and ice cream made with whole milk    ¨ Half and half creamer, heavy cream, and whipping cream    ¨ Cheese, cream cheese, and sour cream    · Meats and proteins:      ¨ High-fat cuts of meat (T-bone steak, regular hamburger, and ribs)    ¨ Fried meat, poultry (turkey and chicken), and fish    ¨ Poultry (chicken and turkey) with skin    ¨ Cold cuts (salami or bologna), hot dogs, goel, and sausage    ¨ Whole eggs and egg yolks    · Vegetables and fruits with added fat:      ¨ Fried vegetables or vegetables in butter or high-fat sauces, such as cream or cheese sauces    ¨ Fried fruit or fruit served with butter or cream    · Fats:      ¨ Butter, stick margarine, and shortening    ¨ Coconut, palm oil, and palm kernel oil  Foods to include:   · Grains:      ¨ Whole-grain breads, cereals, pasta, and brown rice    ¨ Low-fat crackers and pretzels    · Vegetables and fruits:      ¨ Fresh, frozen, or canned vegetables (no salt or low-sodium)    ¨ Fresh, frozen, dried, or canned fruit (canned in light syrup or fruit juice)    ¨ Avocado    · Low-fat dairy products:      ¨ Nonfat (skim) or 1% milk    ¨ Nonfat or low-fat cheese, yogurt, and cottage cheese    · Meats and proteins:      ¨ Chicken or turkey with no skin    ¨ Baked or broiled fish    ¨ Lean beef and pork (loin, round, extra lean hamburger)    ¨ Beans and peas, unsalted nuts, soy products    ¨ Egg whites and substitutes    ¨ Seeds and nuts    · Fats:      ¨ Unsaturated oil, such as canola, olive, peanut, soybean, or sunflower oil    ¨ Soft or liquid margarine and vegetable oil spread    ¨ Low-fat salad dressing  Other ways to decrease fat:   · Read food labels before you buy foods  Choose foods that have less than 30% of calories from fat  Choose low-fat or fat-free dairy products  Remember that fat free does not mean calorie free  These foods still contain calories, and too many calories can lead to weight gain  · Trim fat from meat and avoid fried food  Trim all visible fat from meat before you cook it  Remove the skin from poultry  Do not lala meat, fish, or poultry  Bake, roast, boil, or broil these foods instead  Avoid fried foods  Eat a baked potato instead of [place] fries  Steam vegetables instead of sautéing them in butter  · Add less fat to foods  Use imitation goel bits on salads and baked potatoes instead of regular goel bits   Use fat-free or low-fat salad dressings instead of regular dressings  Use low-fat or nonfat butter-flavored topping instead of regular butter or margarine on popcorn and other foods  Ways to decrease fat in recipes:  Replace high-fat ingredients with low-fat or nonfat ones  This may cause baked goods to be drier than usual  You may need to use nonfat cooking spray on pans to prevent food from sticking  You also may need to change the amount of other ingredients, such as water, in the recipe  Try the following:  · Use low-fat or light margarine instead of regular margarine or shortening  · Use lean ground turkey breast or chicken, or lean ground beef (less than 5% fat) instead of hamburger  · Add 1 teaspoon of canola oil to 8 ounces of skim milk instead of using cream or half and half  · Use grated zucchini, carrots, or apples in breads instead of coconut  · Use blenderized, low-fat cottage cheese, plain tofu, or low-fat ricotta cheese instead of cream cheese  · Use 1 egg white and 1 teaspoon of canola oil, or use ¼ cup (2 ounces) of fat-free egg substitute instead of a whole egg  · Replace half of the oil that is called for in a recipe with applesauce when you bake  Use 3 tablespoons of cocoa powder and 1 tablespoon of canola oil instead of a square of baking chocolate  How to increase fiber:  Eat enough high-fiber foods to get 20 to 30 grams of fiber every day  Slowly increase your fiber intake to avoid stomach cramps, gas, and other problems  · Eat 3 ounces of whole-grain foods each day  An ounce is about 1 slice of bread  Eat whole-grain breads, such as whole-wheat bread  Whole wheat, whole-wheat flour, or other whole grains should be listed as the first ingredient on the food label  Replace white flour with whole-grain flour or use half of each in recipes  Whole-grain flour is heavier than white flour, so you may have to add more yeast or baking powder       · Eat a high-fiber cereal for breakfast   [place] is a good source of soluble fiber  Look for cereals that have bran or fiber in the name  Choose whole-grain products, such as brown rice, barley, and whole-wheat pasta  · Eat more beans, peas, and lentils  For example, add beans to soups or salads  Eat at least 5 cups of fruits and vegetables each day  Eat fruits and vegetables with the peel because the peel is high in fiber  © 2017 [address] [facility] Information is for End User's use only and may not be sold, redistributed or otherwise used for commercial purposes  All illustrations and images included in CareNotes® are the copyrighted property of [employer]  or Spike Santiago  The above information is an  only  It is not intended as medical advice for individual conditions or treatments  Talk to your doctor, nurse or pharmacist before following any medical regimen to see if it is safe and effective for you  Heart Healthy Diet   AMBULATORY CARE:   A heart healthy diet  is an eating plan low in total fat, unhealthy fats, and sodium (salt)  A heart healthy diet helps decrease your risk for heart disease and stroke  Limit the amount of fat you eat to 25% to 35% of your total daily calories  Limit sodium to less than 2,300 mg each day  Healthy fats:  Healthy fats can help improve cholesterol levels  The risk for heart disease is decreased when cholesterol levels are normal  Choose healthy fats, such as the following:  · Unsaturated fat  is found in foods such as soybean, canola, olive, corn, and safflower oils  It is also found in soft tub margarine that is made with liquid vegetable oil  · Omega-3 fat  is found in certain fish, such as salmon, tuna, and trout, and in walnuts and flaxseed  Unhealthy fats:  Unhealthy fats can cause unhealthy cholesterol levels in your blood and increase your risk of heart disease   Limit unhealthy fats, such as the following:  · Cholesterol  is found in animal foods, such as eggs and lobster, and in dairy products made from whole milk  Limit cholesterol to less than 300 milligrams (mg) each day  You may need to limit cholesterol to 200 mg each day if you have heart disease  · Saturated fat  is found in meats, such as goel and hamburger  It is also found in chicken or turkey skin, whole milk, and butter  Limit saturated fat to less than 7% of your total daily calories  Limit saturated fat to less than 6% if you have heart disease or are at increased risk for it  · Trans fat  is found in packaged foods, such as potato chips and cookies  It is also in hard margarine, some fried foods, and shortening  Avoid trans fats as much as possible    Heart healthy foods and drinks to include:  Ask your dietitian or healthcare provider how many servings to have from each of the following food groups:  · Grains:      ¨ Whole-wheat breads, cereals, and pastas, and brown rice    ¨ Low-fat, low-sodium crackers and chips    · Vegetables:      ¨ Broccoli, green beans, green peas, and spinach    ¨ Collards, kale, and lima beans    ¨ Carrots, sweet potatoes, tomatoes, and peppers    ¨ Canned vegetables with no salt added    · Fruits:      ¨ Bananas, peaches, pears, and pineapple    ¨ Grapes, raisins, and dates    ¨ Oranges, tangerines, grapefruit, orange juice, and grapefruit juice    ¨ Apricots, mangoes, melons, and papaya    ¨ Raspberries and strawberries    ¨ Canned fruit with no added sugar    · Low-fat dairy products:      ¨ Nonfat (skim) milk, 1% milk, and low-fat almond, cashew, or soy milks fortified with calcium    ¨ Low-fat cheese, regular or frozen yogurt, and cottage cheese    · Meats and proteins , such as lean cuts of beef and pork (loin, leg, round), skinless chicken and turkey, legumes, soy products, egg whites, and nuts  Foods and drinks to limit or avoid:  Ask your dietitian or healthcare provider about these and other foods that are high in unhealthy fat, sodium, and sugar:  · Snack or packaged foods , such as frozen dinners, cookies, macaroni and cheese, and cereals with more than 300 mg of sodium per serving    · Canned or dry mixes  for cakes, soups, sauces, or gravies    · Vegetables with added sodium , such as instant potatoes, vegetables with added sauces, or regular canned vegetables    · Other foods high in sodium , such as ketchup, barbecue sauce, salad dressing, pickles, olives, soy sauce, and miso    · High-fat dairy foods  such as whole or 2% milk, cream cheese, or sour cream, and cheeses     · High-fat protein foods  such as high-fat cuts of beef (T-bone steaks, ribs), chicken or turkey with skin, and organ meats, such as liver    · Cured or smoked meats , such as hot dogs, goel, and sausage    · Unhealthy fats and oils , such as butter, stick margarine, shortening, and cooking oils such as coconut or palm oil    · Food and drinks high in sugar , such as soft drinks (soda), sports drinks, sweetened tea, candy, cake, cookies, pies, and doughnuts  Other diet guidelines to follow:   · Eat more foods containing omega-3 fats  Eat fish high in omega-3 fats at least 2 times a week  · Limit alcohol  Too much alcohol can damage your heart and raise your blood pressure  Women should limit alcohol to 1 drink a day  Men should limit alcohol to 2 drinks a day  A drink of alcohol is 12 ounces of beer, 5 ounces of wine, or 1½ ounces of liquor  · Choose low-sodium foods  High-sodium foods can lead to high blood pressure  Add little or no salt to food you prepare  Use herbs and spices in place of salt  · Eat more fiber  to help lower cholesterol levels  Eat at least 5 servings of fruits and vegetables each day  Eat 3 ounces of whole-grain foods each day  Legumes (beans) are also a good source of fiber  Lifestyle guidelines:   · Do not smoke  Nicotine and other chemicals in cigarettes and cigars can cause lung and heart damage   Ask your healthcare provider for information if you currently smoke and need help to quit  E-cigarettes or smokeless tobacco still contain nicotine  Talk to your healthcare provider before you use these products  · Exercise regularly  to help you maintain a healthy weight and improve your blood pressure and cholesterol levels  Ask your healthcare provider about the best exercise plan for you  Do not start an exercise program without asking your healthcare provider  Follow up with your healthcare provider as directed:  Write down your questions so you remember to ask them during your visits  © 2017 [address] [facility] Information is for End User's use only and may not be sold, redistributed or otherwise used for commercial purposes  All illustrations and images included in CareNotes® are the copyrighted property of A D A M [employer]  or Spike Jack  The above information is an  only  It is not intended as medical advice for individual conditions or treatments  Talk to your doctor, nurse or pharmacist before following any medical regimen to see if it is safe and effective for you  You may use Tylenol (Acetaminophen) up to 3,000mg daily (in 24 hours) as needed for pain or fever  You may use Motrin (Ibuprofen) up to 800mg 3 times daily with food (in 24 hours) as needed for pain or fever  Fibromyalgia   WHAT YOU NEED TO KNOW:   Fibromyalgia is a long-term condition that causes pain and tender points throughout your body  Fibromyalgia can start at any age and is more common in women than in men  WHILE YOU ARE HERE:   Informed consent  is a legal document that explains the tests, treatments, or procedures that you may need  Informed consent means you understand what will be done and can make decisions about what you want  You give your permission when you sign the consent form  You can have someone sign this form for you if you are not able to sign it  You have the right to understand your medical care in words you know  Before you sign the consent form, understand the risks and benefits of what will be done  Make sure all your questions are answered  Medicines:   · Pain medicine: You may be given a prescription medicine to decrease pain  Do not wait until the pain is severe before you ask for more medicine  · Muscle relaxers  help decrease pain and muscle spasms  · Antidepressants: These are used to help decrease depression, pain, and fatigue  · Antiseizure medicine: These are used to reduce fibromyalgia pain  Tests:   · Blood tests: You may need blood taken to give caregivers information about how your body is working  The blood may be taken from your hand, arm, or IV  · Lumbar puncture: This procedure may also be called a spinal tap  During a lumbar puncture, you will need to lie very still  Caregivers may give you medicine to make you lose feeling in a small area of your back  Caregivers will clean this area of your back  A needle will be put in, and fluid removed from around your spinal cord  The fluid will be sent to a lab for tests  The tests check for infection, bleeding around your brain and spinal cord, or other problems  Sometimes medicine may be put into your back to treat your illness  · Sleep studies: Sleep studies are also called polysomnography  Sleep studies can help caregivers see how your brain, heart, and breathing system are working during sleep  Sleep studies may monitor the stages of sleep, oxygen levels, body position, eye movement, and snoring during sleep  · Urine sample: For this test you need to urinate into a small container  You will be given instructions on how to clean your genital area before you urinate  Do not touch the inside of the cup  Follow instructions on where to place the cup of urine when you are done  RISKS:   If untreated, your symptoms of fibromyalgia may get worse  Pain may make it difficult to do daily activities   Your risk for fatigue, headaches, and depression may increase  CARE AGREEMENT:   You have the right to help plan your care  Learn about your health condition and how it may be treated  Discuss treatment options with your caregivers to decide what care you want to receive  You always have the right to refuse treatment  © 2017 [employer] [employer] Information is for End User's use only and may not be sold, redistributed or otherwise used for commercial purposes  All illustrations and images included in CareNotes® are the copyrighted property of [employer]  or Spike Santiago  The above information is an  only  It is not intended as medical advice for individual conditions or treatments  Talk to your doctor, nurse or pharmacist before following any medical regimen to see if it is safe and effective for you

## 2019-11-22 NOTE — ASSESSMENT & PLAN NOTE
Stable  Continue ASA, statin  Patient declines taking higher statin dose than Lipitor 10mg 1/2 pill QHS  Recommend lifestyle modifications

## 2019-11-22 NOTE — ASSESSMENT & PLAN NOTE
New Dx 11/22/19  +12/18 tender points  Start Cymbalta 60mg daily for fibromyalgia  Patient declines aqua therapy  Recommend lifestyle modifications

## 2019-11-23 LAB — B BURGDOR IGG+IGM SER-ACNC: <0.91 ISR (ref 0–0.9)

## 2019-11-24 LAB
CCP IGA+IGG SERPL IA-ACNC: 7 UNITS (ref 0–19)
RHEUMATOID FACT SER QL LA: NEGATIVE

## 2019-11-25 LAB — RYE IGE QN: NEGATIVE

## 2019-11-25 NOTE — RESULT ENCOUNTER NOTE
Stable labs  Pending TRAV screen, which is rheumatologic marker      Message sent to patient via [employer] patient portal

## 2019-11-25 NOTE — RESULT ENCOUNTER NOTE
Negative TRAV screen, which is a rheumatoid marker  Continue plan of care      Message sent to patient via [facility] patient portal

## 2019-11-29 NOTE — RESULT ENCOUNTER NOTE
Stable thoracic spine x-ray  Continue plan of care        Message sent to patient via [employer] patient portal

## 2019-12-18 ENCOUNTER — TELEPHONE (OUTPATIENT)
Dept: FAMILY MEDICINE CLINIC | Facility: CLINIC | Age: 44
End: 2019-12-18

## 2019-12-18 DIAGNOSIS — Z86.73 H/O TIA (TRANSIENT ISCHEMIC ATTACK) AND STROKE: ICD-10-CM

## 2019-12-18 RX ORDER — ATORVASTATIN CALCIUM 10 MG/1
10 TABLET, FILM COATED ORAL
Qty: 90 TABLET | Refills: 1 | Status: SHIPPED | OUTPATIENT
Start: 2019-12-18 | End: 2020-05-22 | Stop reason: SDUPTHER

## 2019-12-19 ENCOUNTER — TELEPHONE (OUTPATIENT)
Dept: FAMILY MEDICINE CLINIC | Facility: CLINIC | Age: 44
End: 2019-12-19

## 2019-12-19 DIAGNOSIS — M79.7 FIBROMYALGIA: ICD-10-CM

## 2019-12-19 RX ORDER — DULOXETIN HYDROCHLORIDE 60 MG/1
60 CAPSULE, DELAYED RELEASE ORAL DAILY
Qty: 90 CAPSULE | Refills: 0 | Status: SHIPPED | OUTPATIENT
Start: 2019-12-19 | End: 2020-01-10 | Stop reason: SDUPTHER

## 2019-12-19 RX ORDER — DULOXETIN HYDROCHLORIDE 30 MG/1
30 CAPSULE, DELAYED RELEASE ORAL DAILY
Qty: 90 CAPSULE | Refills: 0 | Status: CANCELLED | OUTPATIENT
Start: 2019-12-19 | End: 2019-12-26

## 2019-12-19 NOTE — TELEPHONE ENCOUNTER
Medication refill request:   Cymbalta 30/60  Last office visit: 11/22/19  Next office visit: 1/3/2019  Last refilled: 11/22/19   30 mg - #7x0  60 mg - #30x1  *were sent to local  Pharmacy:   [address] [place] [address]

## 2019-12-23 DIAGNOSIS — Z12.31 ENCOUNTER FOR SCREENING MAMMOGRAM FOR BREAST CANCER: ICD-10-CM

## 2019-12-31 ENCOUNTER — LAB (OUTPATIENT)
Dept: LAB | Facility: CLINIC | Age: 44
End: 2019-12-31
Payer: COMMERCIAL

## 2019-12-31 DIAGNOSIS — M79.7 FIBROMYALGIA: ICD-10-CM

## 2019-12-31 LAB
ALBUMIN SERPL BCP-MCNC: 3.7 G/DL (ref 3.5–5)
ALP SERPL-CCNC: 46 U/L (ref 46–116)
ALT SERPL W P-5'-P-CCNC: 23 U/L (ref 12–78)
ANION GAP SERPL CALCULATED.3IONS-SCNC: 3 MMOL/L (ref 4–13)
AST SERPL W P-5'-P-CCNC: 10 U/L (ref 5–45)
BILIRUB SERPL-MCNC: 0.49 MG/DL (ref 0.2–1)
BUN SERPL-MCNC: 13 MG/DL (ref 5–25)
CALCIUM SERPL-MCNC: 8.7 MG/DL (ref 8.3–10.1)
CHLORIDE SERPL-SCNC: 107 MMOL/L (ref 100–108)
CO2 SERPL-SCNC: 27 MMOL/L (ref 21–32)
CREAT SERPL-MCNC: 0.77 MG/DL (ref 0.6–1.3)
GFR SERPL CREATININE-BSD FRML MDRD: 93 ML/MIN/1.73SQ M
GLUCOSE P FAST SERPL-MCNC: 85 MG/DL (ref 65–99)
POTASSIUM SERPL-SCNC: 4 MMOL/L (ref 3.5–5.3)
PROT SERPL-MCNC: 7.1 G/DL (ref 6.4–8.2)
SODIUM SERPL-SCNC: 137 MMOL/L (ref 136–145)

## 2019-12-31 PROCEDURE — 36415 COLL VENOUS BLD VENIPUNCTURE: CPT

## 2019-12-31 PROCEDURE — 80053 COMPREHEN METABOLIC PANEL: CPT

## 2020-01-08 NOTE — PROGRESS NOTES
Assessment/Plan:  Problem List Items Addressed This Visit        Other    Fibromyalgia - Primary     Improved on Cymbalta 60 mg daily  Patient declines reducing Cymbalta to see if insomnia would improve  She will continue to take Unisom or Benadryl at bedtime as needed  Relevant Medications    DULoxetine (CYMBALTA) 60 mg delayed release capsule    Anxiety     Stable on Cymbalta 60 mg daily  Neck pain     Improved on Cymbalta 60 mg daily  Continue Voltaren topical gel p r n  Return if symptoms worsen or fail to improve  Future Appointments   Date Time Provider Sari Corona   5/22/2020 11:20 AM Wendy Forde, DO [facility]        Subjective:     Luis M Mccord is a 53 y o  female who presents today for a follow-up on her chronic medical conditions  HPI:  Chief Complaint   Patient presents with    Follow-up     6 weeks (around 1/3/2020) for F/U FM, Labs;  6mo- Anxiety, CVA, TIA, s/p PFO, AR, Labs  -- Above per clinical staff and reviewed  --      HPI      Today:    Return in about 6 weeks (around 1/3/2020) for F/U FM, Labs;  6mo- Anxiety, CVA, TIA, s/p PFO, AR, Labs  F/U FM, Labs    Watching diet - intermittent fasting due to work schedule   No Exercising - Previously running, walking - For 20 minutes, 2-3 times per week        Fibromyalgia / Arthralgias / Myalgias - On Cymbalta 60mg QD x 6 weeks  Feels better  Improved 98%, but feels stimulated by Rx  She need to take Advil PM to sleep  Had sternal and thoracic pain after doing hot yoga x 2, wore posture correction for 6 hours  Crampy pain  Had intermittent B/L ankle stiffness with cold weather, improved c heat and activity  Chest pain is resolved  Neck and thoracic back pain improved with heat  No longer has clicking in her spine  Denies trauma  Denies joint redness, swelling, warmth in joint    No longer using Motrin 8-10 pills daily and Tylenol 2 tabs 2-3 times per day - less helpful, CBD cream helpful  Feels her posture is bad           Headaches / Burning Neck Pain - Saw Ortho Dr Lissa Armijo, s/p PT  Resolved    Intermittent symptoms of neck pain c weather change    Using Voltaren Gel c benefit when Tylenol / Motrin unhelpful  HA improved since PFO repaired  Anxiety - Self-weaned Zoloft 50mg QD 8/18 due to gaining weight  She is taking CBD oil instead since 8/18  Mood is stable, sleep is improved  No SI/HI/AH/VH   Feels safe at home  Drinking 12oz coffee daily              PHQ-9 Depression Screening    PHQ-9:    Frequency of the following problems over the past two weeks:       Little interest or pleasure in doing things:  0 - not at all  Feeling down, depressed, or hopeless:  0 - not at all  PHQ-2 Score:  0       ALYCE-7 Flowsheet Screening      Most Recent Value   Over the last two weeks, how often have you been bothered by the following problems? Feeling nervous, anxious, or on edge  0   Not being able to stop or control worrying  0   Worrying too much about different things  0   Trouble relaxing   0   Being so restless that it's hard to sit still  0   Becoming easily annoyed or irritable   0   Feeling afraid as if something awful might happen  0   How difficult have these problems made it for you to do your work, take care of things at home, or get along with other people? Not difficult at all   ALYCE Score   0            From previous note:    Return in about 6 months (around 11/17/2019) for 6mo- Anxiety, CVA, TIA, s/p PFO, Vaping, AR, Labs          6mo OV     Discolored Toenails - Symptoms x 9 months, unchanged   Left 4th toe, Right 3rd and 4th finger with hyperpigmented vertical stripe   She has been wearing acrylic nails for a long time   Denies itching, pain, bleeding   Derm thinks benign hyperpigmentation  Next appt 4/20        Arthralgias / Myalgias - Symptoms x 3-4 weeks of sternal and thoracic pain after doing hot yoga x 2, wore posture correction for 6 hours  Crampy pain    Has intermittent B/L ankle stiffness with cold weather, improved c heat and activity  Chest pain is improving with time  Neck and thoracic back pain improve with heat  She feels clicking in her spine  Denies trauma  Denies joint redness, swelling, warmth in joint  Using Motrin 8-10 pills daily  and Tylenol 2 tabs 2-3 times per day - less helpful, CBD cream helpful  Feels her posture is bad          Overweight - Watching diet with [employer] blue plan since 10/19 - tintermittent fasting due to work schedule   +Exercising - stretching ribs for a few days  Previously running, walking - For 20 minutes, 2-3 times per week       Headaches / Burning Neck Pain - Allyson Cervantes, s/p PT  Resolved    Intermittent symptoms of neck pain c weather change    Using Voltaren Gel c benefit when Tylenol / Motrin unhelpful  HA improved since PFO repaired  Anxiety - Self-weaned Zoloft 50mg QD 8/18 due to gaining weight  She is taking CBD oil instead since 8/18  Mood is stable, sleep is improved  No SI/HI/AH/VH   Feels safe at home  Drinking 12oz coffee daily            PHQ-9 Depression Screening    PHQ-9:    Frequency of the following problems over the past two weeks:       Little interest or pleasure in doing things:  0 - not at all  Feeling down, depressed, or hopeless:  0 - not at all  PHQ-2 Score:  0                ALYCE-7 Flowsheet Screening      Most Recent Value   Over the last two weeks, how often have you been bothered by the following problems? Feeling nervous, anxious, or on edge  0   Not being able to stop or control worrying  0   Worrying too much about different things  0   Trouble relaxing   0   Being so restless that it's hard to sit still  0   Becoming easily annoyed or irritable   0   Feeling afraid as if something awful might happen  0   How difficult have these problems made it for you to do your work, take care of things at home, or get along with other people?    Not difficult at all   ALYCE Score   0            Constipation - Chronic  Has BM 2-3 times per week  No longer has nugget stools  Sometimes has stomach twisting  Uses Bentyl PRN rarely  Uses Laxatives - OTC store brand PRN - 3 times per month  She states stool softeners not helpful       Had Tdap at [facility] 2012 - med recs requested, but not available         Antiphospholipid Syndrome / CVA 4/12/17, TIA 7/19/17 -   +CVA on MRI   +ELIZABETH c Biatrial PFO   Neuro started ASA 81mg and Lipitor 40mg QHS   Thrombotic risk panel showed - Cardiolipin IgM positive, which Neuro believes is a transient finding   Neuro plans to repeat Cardiolipin IgM 7/17 - repeated 9/7/17 and again positive   Next Neuro appt 5/19 / PRN   s/p PFO closure 4/25/17 c Cardio Dr Seble Puri   s/p thrombolytic  at [facility] 2/4/18 c Dr Abelardo Bonilla - Advises ASA 81mg QD and possible Holter / Loop - pt will discuss c Neuro  Neuro Dr Ashlee Upton declined recommendation per patient        TIA 4/19/17- Pt returned to ER with funny feeling in head and LUE (not RUE) numbness   MRI revealed no evidence of CVA   Neuro and Cardio advised Plavix for at least 6 months and ASA 81mg for life   I/P team decreased Lipitor 40mg 1/2 pill QHS as pt was experiencing hair loss        APS - s/p Neuro input   Currently on ASA, D/C Plavix              AR - Stable on Zyrtec PRN  She quit Vaping       Reviewed:   Labs 12/31/19, Ortho 12/11/17, Cardio 11/8/17, Rheum 10/16/17, Heme/Onc 10/4/17, Neuro 5/31/18       Sees Gyn Dr Juan R Davis yearly   Next appt 11/19   Due for Mammogram  Milena Kam recs requested again - Last Pap 2016            The following portions of the patient's history were reviewed and updated as appropriate: allergies, current medications, past family history, past medical history, past social history, past surgical history and problem list       Review of Systems   Constitutional: Negative for appetite change, chills, diaphoresis, fatigue and fever     Respiratory: Negative for chest tightness and shortness of breath  Cardiovascular: Negative for chest pain  Gastrointestinal: Positive for constipation ( Mild)  Negative for abdominal pain, blood in stool, diarrhea, nausea and vomiting  Genitourinary: Negative for dysuria  Current Outpatient Medications   Medication Sig Dispense Refill    acetaminophen (TYLENOL) 500 mg tablet Take 500 mg by mouth every 6 (six) hours as needed      ascorbic acid (VITAMIN C) 500 mg tablet Take 500 mg by mouth daily      aspirin 81 MG tablet Take 81 mg by mouth daily      atorvastatin (LIPITOR) 10 mg tablet Take 1 tablet (10 mg total) by mouth daily at bedtime 90 tablet 1    Calcium Carbonate Antacid 648 MG TABS Take 648 mg by mouth daily      cetirizine (ZYRTEC ALLERGY) 10 mg tablet Take 10 mg by mouth daily as needed       Cholecalciferol (D 1000) 1000 units capsule Take 2,000 Units by mouth daily      Coenzyme Q10 300 MG CAPS Take 300 mg by mouth daily      DULoxetine (CYMBALTA) 60 mg delayed release capsule Take 1 capsule (60 mg total) by mouth daily 90 capsule 0    ferrous sulfate 325 (65 Fe) mg tablet Take 325 mg by mouth every other day       Ibuprofen-diphenhydrAMINE Cit (ADVIL PM PO) Take 1 tablet by mouth daily at bedtime      Magnesium Gluconate 550 MG TABS Take 550 mg by mouth daily       Multiple Vitamin (TAB-A-CORWIN) TABS Take 1 tablet by mouth daily      NON FORMULARY Take 4 drops by mouth 2 (two) times a day       Omega-3 Fatty Acids (FISH OIL) 1,000 mg Take 1,000 mg by mouth daily      diclofenac sodium (VOLTAREN) 1 % Apply 2 g topically 4 (four) times a day (Patient not taking: Reported on 1/10/2020) 100 g 1    dicyclomine (BENTYL) 20 mg tablet Take 20 mg by mouth every 6 (six) hours as needed       No current facility-administered medications for this visit          Objective:  /76   Pulse 73   Temp 98 °F (36 7 °C)   Resp 12   Ht 5' 4" (1 626 m)   Wt 65 8 kg (145 lb)   SpO2 99%   BMI 24 89 kg/m²    Wt Readings from Last 3 Encounters:   01/10/20 65 8 kg (145 lb)   11/22/19 66 6 kg (146 lb 12 8 oz)   05/17/19 71 2 kg (157 lb)      BP Readings from Last 3 Encounters:   01/10/20 126/76   11/22/19 122/70   05/17/19 128/82          Physical Exam   Constitutional: She is oriented to person, place, and time  She appears well-developed and well-nourished  HENT:   Head: Normocephalic and atraumatic  Eyes: Conjunctivae are normal    Neck: Neck supple  No thyromegaly present  Cardiovascular: Normal rate, regular rhythm, normal heart sounds and intact distal pulses  Pulmonary/Chest: Effort normal and breath sounds normal    Musculoskeletal: She exhibits no edema or tenderness  Neurological: She is alert and oriented to person, place, and time  Psychiatric: She has a normal mood and affect  Her behavior is normal  Judgment and thought content normal    Nursing note and vitals reviewed  Lab Results:      Lab Results   Component Value Date    WBC 4 69 11/15/2019    HGB 11 8 11/15/2019    HCT 37 4 11/15/2019     11/15/2019    TRIG 54 11/15/2019    HDL 69 11/15/2019    ALT 23 12/31/2019    AST 10 12/31/2019    K 4 0 12/31/2019     12/31/2019    CREATININE 0 77 12/31/2019    BUN 13 12/31/2019    CO2 27 12/31/2019    GLUF 85 12/31/2019    HGBA1C 4 7 11/15/2019     Lab Results   Component Value Date    URICACID 3 6 11/22/2019     Invalid input(s): BASENAME Vitamin D    No results found       POCT Labs

## 2020-01-10 ENCOUNTER — OFFICE VISIT (OUTPATIENT)
Dept: FAMILY MEDICINE CLINIC | Facility: CLINIC | Age: 45
End: 2020-01-10
Payer: COMMERCIAL

## 2020-01-10 VITALS
SYSTOLIC BLOOD PRESSURE: 126 MMHG | WEIGHT: 145 LBS | RESPIRATION RATE: 12 BRPM | DIASTOLIC BLOOD PRESSURE: 76 MMHG | TEMPERATURE: 98 F | HEIGHT: 64 IN | BODY MASS INDEX: 24.75 KG/M2 | HEART RATE: 73 BPM | OXYGEN SATURATION: 99 %

## 2020-01-10 DIAGNOSIS — F41.9 ANXIETY: ICD-10-CM

## 2020-01-10 DIAGNOSIS — M79.7 FIBROMYALGIA: Primary | ICD-10-CM

## 2020-01-10 DIAGNOSIS — M54.2 NECK PAIN: ICD-10-CM

## 2020-01-10 PROCEDURE — 99214 OFFICE O/P EST MOD 30 MIN: CPT | Performed by: FAMILY MEDICINE

## 2020-01-10 PROCEDURE — 1036F TOBACCO NON-USER: CPT | Performed by: FAMILY MEDICINE

## 2020-01-10 PROCEDURE — 3008F BODY MASS INDEX DOCD: CPT | Performed by: FAMILY MEDICINE

## 2020-01-10 RX ORDER — DULOXETIN HYDROCHLORIDE 60 MG/1
60 CAPSULE, DELAYED RELEASE ORAL DAILY
Qty: 90 CAPSULE | Refills: 0 | Status: SHIPPED | OUTPATIENT
Start: 2020-01-10 | End: 2020-05-22

## 2020-01-10 NOTE — ASSESSMENT & PLAN NOTE
Improved on Cymbalta 60 mg daily  Patient declines reducing Cymbalta to see if insomnia would improve  She will continue to take Unisom or Benadryl at bedtime as needed

## 2020-03-01 ENCOUNTER — PATIENT MESSAGE (OUTPATIENT)
Dept: FAMILY MEDICINE CLINIC | Facility: CLINIC | Age: 45
End: 2020-03-01

## 2020-03-01 DIAGNOSIS — M79.7 FIBROMYALGIA: ICD-10-CM

## 2020-03-02 NOTE — TELEPHONE ENCOUNTER
Medication refill request: Cymbalta 60 mg   Last office visit: 01/10/2020  Next office visit: 05/22/2020  Last refilled: 01/10/2020 #[id number]  Pharmacy:   [facility] [address] [address]  Phone: [phone] Fax: [phone]      Placed call to [facility] - refill sent on 01/10/2020 was received and on hold as it was too soon to refill at that time  They are processing the patients prescription now for shipment

## 2020-03-02 NOTE — TELEPHONE ENCOUNTER
From: Kedar Moreno  To: Wendy Forde DO  Sent: 3/1/2020 4:41 PM EST  Subject: Prescription Question    Happy Monday Dr Orlin Rosario,     I hope this finds you well, I am in need of new refill of Duloxetine  I have about two weeks left on my last refill  Please use [facility] mail order for my insurance  Feel free to call with any questions or concerns      Thank you very much,  Luis M Mccord

## 2020-03-23 ENCOUNTER — OCCMED (OUTPATIENT)
Dept: URGENT CARE | Facility: CLINIC | Age: 45
End: 2020-03-23
Payer: OTHER MISCELLANEOUS
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
## 2020-03-23 PROCEDURE — 99283 EMERGENCY DEPT VISIT LOW MDM: CPT | Performed by: NURSE PRACTITIONER

## 2020-03-23 PROCEDURE — G0382 LEV 3 HOSP TYPE B ED VISIT: HCPCS | Performed by: NURSE PRACTITIONER

## 2020-04-27 ENCOUNTER — TELEPHONE (OUTPATIENT)
Dept: FAMILY MEDICINE CLINIC | Facility: CLINIC | Age: 45
End: 2020-04-27

## 2020-05-19 ENCOUNTER — LAB (OUTPATIENT)
Dept: LAB | Facility: CLINIC | Age: 45
End: 2020-05-19
Payer: COMMERCIAL

## 2020-05-19 DIAGNOSIS — M79.7 FIBROMYALGIA: ICD-10-CM

## 2020-05-19 DIAGNOSIS — F41.9 ANXIETY: ICD-10-CM

## 2020-05-19 DIAGNOSIS — Z86.73 H/O TIA (TRANSIENT ISCHEMIC ATTACK) AND STROKE: ICD-10-CM

## 2020-05-19 DIAGNOSIS — E66.3 OVERWEIGHT: ICD-10-CM

## 2020-05-19 LAB
ALBUMIN SERPL BCP-MCNC: 3.9 G/DL (ref 3.5–5)
ALP SERPL-CCNC: 42 U/L (ref 46–116)
ALT SERPL W P-5'-P-CCNC: 32 U/L (ref 12–78)
ANION GAP SERPL CALCULATED.3IONS-SCNC: 4 MMOL/L (ref 4–13)
AST SERPL W P-5'-P-CCNC: 19 U/L (ref 5–45)
BASOPHILS # BLD AUTO: 0.07 THOUSANDS/ΜL (ref 0–0.1)
BASOPHILS NFR BLD AUTO: 1 % (ref 0–1)
BILIRUB SERPL-MCNC: 0.46 MG/DL (ref 0.2–1)
BUN SERPL-MCNC: 11 MG/DL (ref 5–25)
CALCIUM SERPL-MCNC: 8.8 MG/DL (ref 8.3–10.1)
CHLORIDE SERPL-SCNC: 108 MMOL/L (ref 100–108)
CHOLEST SERPL-MCNC: 204 MG/DL (ref 50–200)
CO2 SERPL-SCNC: 26 MMOL/L (ref 21–32)
CREAT SERPL-MCNC: 0.81 MG/DL (ref 0.6–1.3)
EOSINOPHIL # BLD AUTO: 0.23 THOUSAND/ΜL (ref 0–0.61)
EOSINOPHIL NFR BLD AUTO: 5 % (ref 0–6)
ERYTHROCYTE [DISTWIDTH] IN BLOOD BY AUTOMATED COUNT: 11.9 % (ref 11.6–15.1)
GFR SERPL CREATININE-BSD FRML MDRD: 87 ML/MIN/1.73SQ M
GLUCOSE P FAST SERPL-MCNC: 98 MG/DL (ref 65–99)
HCT VFR BLD AUTO: 35.1 % (ref 34.8–46.1)
HDLC SERPL-MCNC: 93 MG/DL
HGB BLD-MCNC: 11.1 G/DL (ref 11.5–15.4)
IMM GRANULOCYTES # BLD AUTO: 0.02 THOUSAND/UL (ref 0–0.2)
IMM GRANULOCYTES NFR BLD AUTO: 0 % (ref 0–2)
LDLC SERPL CALC-MCNC: 96 MG/DL (ref 0–100)
LDLC SERPL DIRECT ASSAY-MCNC: 85 MG/DL (ref 0–100)
LYMPHOCYTES # BLD AUTO: 1.68 THOUSANDS/ΜL (ref 0.6–4.47)
LYMPHOCYTES NFR BLD AUTO: 35 % (ref 14–44)
MAGNESIUM SERPL-MCNC: 2.4 MG/DL (ref 1.6–2.6)
MCH RBC QN AUTO: 29.8 PG (ref 26.8–34.3)
MCHC RBC AUTO-ENTMCNC: 31.6 G/DL (ref 31.4–37.4)
MCV RBC AUTO: 94 FL (ref 82–98)
MONOCYTES # BLD AUTO: 0.49 THOUSAND/ΜL (ref 0.17–1.22)
MONOCYTES NFR BLD AUTO: 10 % (ref 4–12)
NEUTROPHILS # BLD AUTO: 2.37 THOUSANDS/ΜL (ref 1.85–7.62)
NEUTS SEG NFR BLD AUTO: 49 % (ref 43–75)
NONHDLC SERPL-MCNC: 111 MG/DL
NRBC BLD AUTO-RTO: 0 /100 WBCS
PLATELET # BLD AUTO: 282 THOUSANDS/UL (ref 149–390)
PMV BLD AUTO: 10.8 FL (ref 8.9–12.7)
POTASSIUM SERPL-SCNC: 4.3 MMOL/L (ref 3.5–5.3)
PROT SERPL-MCNC: 7.4 G/DL (ref 6.4–8.2)
RBC # BLD AUTO: 3.72 MILLION/UL (ref 3.81–5.12)
SODIUM SERPL-SCNC: 138 MMOL/L (ref 136–145)
TRIGL SERPL-MCNC: 76 MG/DL
TSH SERPL DL<=0.05 MIU/L-ACNC: 1.58 UIU/ML (ref 0.36–3.74)
WBC # BLD AUTO: 4.86 THOUSAND/UL (ref 4.31–10.16)

## 2020-05-19 PROCEDURE — 36415 COLL VENOUS BLD VENIPUNCTURE: CPT

## 2020-05-19 PROCEDURE — 85025 COMPLETE CBC W/AUTO DIFF WBC: CPT

## 2020-05-19 PROCEDURE — 84443 ASSAY THYROID STIM HORMONE: CPT

## 2020-05-19 PROCEDURE — 83735 ASSAY OF MAGNESIUM: CPT

## 2020-05-19 PROCEDURE — 80061 LIPID PANEL: CPT

## 2020-05-19 PROCEDURE — 80053 COMPREHEN METABOLIC PANEL: CPT

## 2020-05-19 PROCEDURE — 83721 ASSAY OF BLOOD LIPOPROTEIN: CPT

## 2020-05-22 ENCOUNTER — TELEMEDICINE (OUTPATIENT)
Dept: FAMILY MEDICINE CLINIC | Facility: CLINIC | Age: 45
End: 2020-05-22
Payer: COMMERCIAL

## 2020-05-22 VITALS — WEIGHT: 154 LBS | BODY MASS INDEX: 26.29 KG/M2 | HEIGHT: 64 IN | TEMPERATURE: 97.2 F

## 2020-05-22 DIAGNOSIS — F41.9 ANXIETY: ICD-10-CM

## 2020-05-22 DIAGNOSIS — M54.2 NECK PAIN: ICD-10-CM

## 2020-05-22 DIAGNOSIS — E66.3 OVERWEIGHT: ICD-10-CM

## 2020-05-22 DIAGNOSIS — M79.7 FIBROMYALGIA: Primary | ICD-10-CM

## 2020-05-22 DIAGNOSIS — Z13.6 SCREENING FOR CARDIOVASCULAR CONDITION: ICD-10-CM

## 2020-05-22 DIAGNOSIS — Z86.73 H/O TIA (TRANSIENT ISCHEMIC ATTACK) AND STROKE: ICD-10-CM

## 2020-05-22 DIAGNOSIS — Z13.1 DIABETES MELLITUS SCREENING: ICD-10-CM

## 2020-05-22 DIAGNOSIS — J30.9 ALLERGIC RHINITIS, UNSPECIFIED SEASONALITY, UNSPECIFIED TRIGGER: ICD-10-CM

## 2020-05-22 PROCEDURE — 1036F TOBACCO NON-USER: CPT | Performed by: FAMILY MEDICINE

## 2020-05-22 PROCEDURE — 3008F BODY MASS INDEX DOCD: CPT | Performed by: FAMILY MEDICINE

## 2020-05-22 PROCEDURE — 99215 OFFICE O/P EST HI 40 MIN: CPT | Performed by: FAMILY MEDICINE

## 2020-05-22 RX ORDER — ATORVASTATIN CALCIUM 10 MG/1
10 TABLET, FILM COATED ORAL
Qty: 90 TABLET | Refills: 1 | Status: SHIPPED | OUTPATIENT
Start: 2020-05-22 | End: 2020-12-04 | Stop reason: SDUPTHER

## 2020-05-22 RX ORDER — CYCLOBENZAPRINE HCL 5 MG
5 TABLET ORAL
Qty: 90 TABLET | Refills: 0 | Status: SHIPPED | OUTPATIENT
Start: 2020-05-22 | End: 2020-12-04

## 2020-08-09 NOTE — TELEPHONE ENCOUNTER
Placed call to [facility], spoke to [facility]  They will fill RX as prescribed  Patient was called, informed of dosing recommendations 
Please call with a verbal order to [facility] @ [phone] with Ref # [id number] to change the dosage      Also call Madison Matamoros with the medication change
[facility] states manufacturerdoes not recommending cutting Lipitor 10 mg into a half pill to take nightly  Patient has previously declined taking a full pill  Would advise pharmacy to dispense full pill prescription  Nurse to notify patient of same  eRx sent to [facility] 
0

## 2020-12-03 ENCOUNTER — LAB (OUTPATIENT)
Dept: LAB | Facility: CLINIC | Age: 45
End: 2020-12-03
Payer: COMMERCIAL

## 2020-12-03 DIAGNOSIS — Z13.6 SCREENING FOR CARDIOVASCULAR CONDITION: ICD-10-CM

## 2020-12-03 DIAGNOSIS — F41.9 ANXIETY: ICD-10-CM

## 2020-12-03 DIAGNOSIS — Z86.73 H/O TIA (TRANSIENT ISCHEMIC ATTACK) AND STROKE: ICD-10-CM

## 2020-12-03 DIAGNOSIS — Z13.1 DIABETES MELLITUS SCREENING: ICD-10-CM

## 2020-12-03 DIAGNOSIS — E66.3 OVERWEIGHT: ICD-10-CM

## 2020-12-03 DIAGNOSIS — M79.7 FIBROMYALGIA: ICD-10-CM

## 2020-12-03 LAB
25(OH)D3 SERPL-MCNC: 77.3 NG/ML (ref 30–100)
ALBUMIN SERPL BCP-MCNC: 4.3 G/DL (ref 3.5–5)
ALP SERPL-CCNC: 53 U/L (ref 46–116)
ALT SERPL W P-5'-P-CCNC: 27 U/L (ref 12–78)
ANION GAP SERPL CALCULATED.3IONS-SCNC: 3 MMOL/L (ref 4–13)
AST SERPL W P-5'-P-CCNC: 12 U/L (ref 5–45)
BASOPHILS # BLD AUTO: 0.04 THOUSANDS/ΜL (ref 0–0.1)
BASOPHILS NFR BLD AUTO: 1 % (ref 0–1)
BILIRUB SERPL-MCNC: 0.99 MG/DL (ref 0.2–1)
BUN SERPL-MCNC: 12 MG/DL (ref 5–25)
CALCIUM SERPL-MCNC: 9.4 MG/DL (ref 8.3–10.1)
CHLORIDE SERPL-SCNC: 105 MMOL/L (ref 100–108)
CHOLEST SERPL-MCNC: 181 MG/DL (ref 50–200)
CO2 SERPL-SCNC: 28 MMOL/L (ref 21–32)
CREAT SERPL-MCNC: 0.84 MG/DL (ref 0.6–1.3)
EOSINOPHIL # BLD AUTO: 0.07 THOUSAND/ΜL (ref 0–0.61)
EOSINOPHIL NFR BLD AUTO: 1 % (ref 0–6)
ERYTHROCYTE [DISTWIDTH] IN BLOOD BY AUTOMATED COUNT: 11.9 % (ref 11.6–15.1)
EST. AVERAGE GLUCOSE BLD GHB EST-MCNC: 100 MG/DL
GFR SERPL CREATININE-BSD FRML MDRD: 83 ML/MIN/1.73SQ M
GLUCOSE P FAST SERPL-MCNC: 85 MG/DL (ref 65–99)
HBA1C MFR BLD: 5.1 %
HCT VFR BLD AUTO: 40.1 % (ref 34.8–46.1)
HDLC SERPL-MCNC: 73 MG/DL
HGB BLD-MCNC: 12.9 G/DL (ref 11.5–15.4)
IMM GRANULOCYTES # BLD AUTO: 0.02 THOUSAND/UL (ref 0–0.2)
IMM GRANULOCYTES NFR BLD AUTO: 0 % (ref 0–2)
LDLC SERPL CALC-MCNC: 92 MG/DL (ref 0–100)
LDLC SERPL DIRECT ASSAY-MCNC: 94 MG/DL (ref 0–100)
LYMPHOCYTES # BLD AUTO: 1.29 THOUSANDS/ΜL (ref 0.6–4.47)
LYMPHOCYTES NFR BLD AUTO: 23 % (ref 14–44)
MAGNESIUM SERPL-MCNC: 2.4 MG/DL (ref 1.6–2.6)
MCH RBC QN AUTO: 30.5 PG (ref 26.8–34.3)
MCHC RBC AUTO-ENTMCNC: 32.2 G/DL (ref 31.4–37.4)
MCV RBC AUTO: 95 FL (ref 82–98)
MONOCYTES # BLD AUTO: 0.51 THOUSAND/ΜL (ref 0.17–1.22)
MONOCYTES NFR BLD AUTO: 9 % (ref 4–12)
NEUTROPHILS # BLD AUTO: 3.68 THOUSANDS/ΜL (ref 1.85–7.62)
NEUTS SEG NFR BLD AUTO: 66 % (ref 43–75)
NONHDLC SERPL-MCNC: 108 MG/DL
NRBC BLD AUTO-RTO: 0 /100 WBCS
PLATELET # BLD AUTO: 269 THOUSANDS/UL (ref 149–390)
PMV BLD AUTO: 11 FL (ref 8.9–12.7)
POTASSIUM SERPL-SCNC: 4.1 MMOL/L (ref 3.5–5.3)
PROT SERPL-MCNC: 7.8 G/DL (ref 6.4–8.2)
RBC # BLD AUTO: 4.23 MILLION/UL (ref 3.81–5.12)
SODIUM SERPL-SCNC: 136 MMOL/L (ref 136–145)
TRIGL SERPL-MCNC: 80 MG/DL
TSH SERPL DL<=0.05 MIU/L-ACNC: 1.97 UIU/ML (ref 0.36–3.74)
WBC # BLD AUTO: 5.61 THOUSAND/UL (ref 4.31–10.16)

## 2020-12-03 PROCEDURE — 85025 COMPLETE CBC W/AUTO DIFF WBC: CPT

## 2020-12-03 PROCEDURE — 83721 ASSAY OF BLOOD LIPOPROTEIN: CPT

## 2020-12-03 PROCEDURE — 80061 LIPID PANEL: CPT

## 2020-12-03 PROCEDURE — 84443 ASSAY THYROID STIM HORMONE: CPT

## 2020-12-03 PROCEDURE — 83036 HEMOGLOBIN GLYCOSYLATED A1C: CPT

## 2020-12-03 PROCEDURE — 80053 COMPREHEN METABOLIC PANEL: CPT

## 2020-12-03 PROCEDURE — 36415 COLL VENOUS BLD VENIPUNCTURE: CPT

## 2020-12-03 PROCEDURE — 83735 ASSAY OF MAGNESIUM: CPT

## 2020-12-03 PROCEDURE — 82306 VITAMIN D 25 HYDROXY: CPT

## 2020-12-04 ENCOUNTER — OFFICE VISIT (OUTPATIENT)
Dept: FAMILY MEDICINE CLINIC | Facility: CLINIC | Age: 45
End: 2020-12-04
Payer: COMMERCIAL

## 2020-12-04 VITALS
WEIGHT: 161 LBS | HEART RATE: 82 BPM | DIASTOLIC BLOOD PRESSURE: 70 MMHG | HEIGHT: 65 IN | SYSTOLIC BLOOD PRESSURE: 98 MMHG | OXYGEN SATURATION: 99 % | TEMPERATURE: 97.6 F | BODY MASS INDEX: 26.82 KG/M2 | RESPIRATION RATE: 16 BRPM

## 2020-12-04 DIAGNOSIS — M54.2 NECK PAIN: ICD-10-CM

## 2020-12-04 DIAGNOSIS — Z00.00 ANNUAL PHYSICAL EXAM: Primary | ICD-10-CM

## 2020-12-04 DIAGNOSIS — E66.3 OVERWEIGHT: ICD-10-CM

## 2020-12-04 DIAGNOSIS — Z12.31 ENCOUNTER FOR SCREENING MAMMOGRAM FOR BREAST CANCER: ICD-10-CM

## 2020-12-04 DIAGNOSIS — M79.7 FIBROMYALGIA: ICD-10-CM

## 2020-12-04 DIAGNOSIS — J30.9 ALLERGIC RHINITIS, UNSPECIFIED SEASONALITY, UNSPECIFIED TRIGGER: ICD-10-CM

## 2020-12-04 DIAGNOSIS — Z86.73 H/O TIA (TRANSIENT ISCHEMIC ATTACK) AND STROKE: ICD-10-CM

## 2020-12-04 DIAGNOSIS — F41.9 ANXIETY: ICD-10-CM

## 2020-12-04 PROCEDURE — 3008F BODY MASS INDEX DOCD: CPT | Performed by: FAMILY MEDICINE

## 2020-12-04 PROCEDURE — 99396 PREV VISIT EST AGE 40-64: CPT | Performed by: FAMILY MEDICINE

## 2020-12-04 PROCEDURE — 99214 OFFICE O/P EST MOD 30 MIN: CPT | Performed by: FAMILY MEDICINE

## 2020-12-04 PROCEDURE — 1036F TOBACCO NON-USER: CPT | Performed by: FAMILY MEDICINE

## 2020-12-04 PROCEDURE — 3725F SCREEN DEPRESSION PERFORMED: CPT | Performed by: FAMILY MEDICINE

## 2020-12-04 RX ORDER — ATORVASTATIN CALCIUM 10 MG/1
10 TABLET, FILM COATED ORAL
Qty: 90 TABLET | Refills: 1 | Status: SHIPPED | OUTPATIENT
Start: 2020-12-04 | End: 2021-06-11 | Stop reason: SDUPTHER

## 2020-12-04 RX ORDER — MELOXICAM 15 MG/1
15 TABLET ORAL DAILY
Qty: 90 TABLET | Refills: 1 | Status: SHIPPED | OUTPATIENT
Start: 2020-12-04 | End: 2021-06-11 | Stop reason: SDUPTHER

## 2020-12-04 RX ORDER — CYCLOBENZAPRINE HCL 10 MG
10 TABLET ORAL 3 TIMES DAILY PRN
Qty: 90 TABLET | Refills: 0 | Status: SHIPPED | OUTPATIENT
Start: 2020-12-04 | End: 2021-06-11 | Stop reason: SDUPTHER

## 2020-12-04 RX ORDER — ZINC GLUCONATE 50 MG
50 TABLET ORAL DAILY
COMMUNITY

## 2021-01-25 ENCOUNTER — LAB REQUISITION (OUTPATIENT)
Dept: LAB | Facility: HOSPITAL | Age: 46
End: 2021-01-25
Payer: COMMERCIAL

## 2021-01-25 DIAGNOSIS — Z12.72 ENCOUNTER FOR SCREENING FOR MALIGNANT NEOPLASM OF VAGINA: ICD-10-CM

## 2021-01-25 DIAGNOSIS — Z11.51 ENCOUNTER FOR SCREENING FOR HUMAN PAPILLOMAVIRUS (HPV): ICD-10-CM

## 2021-01-25 DIAGNOSIS — Z01.419 ENCOUNTER FOR GYNECOLOGICAL EXAMINATION (GENERAL) (ROUTINE) WITHOUT ABNORMAL FINDINGS: ICD-10-CM

## 2021-01-25 PROCEDURE — 87624 HPV HI-RISK TYP POOLED RSLT: CPT | Performed by: OBSTETRICS & GYNECOLOGY

## 2021-01-25 PROCEDURE — G0145 SCR C/V CYTO,THINLAYER,RESCR: HCPCS | Performed by: OBSTETRICS & GYNECOLOGY

## 2021-01-27 LAB
HPV HR 12 DNA CVX QL NAA+PROBE: NEGATIVE
HPV16 DNA CVX QL NAA+PROBE: NEGATIVE
HPV18 DNA CVX QL NAA+PROBE: NEGATIVE

## 2021-01-29 LAB
LAB AP GYN PRIMARY INTERPRETATION: NORMAL
LAB AP LMP: NORMAL
Lab: NORMAL

## 2021-06-08 ENCOUNTER — LAB (OUTPATIENT)
Dept: LAB | Facility: CLINIC | Age: 46
End: 2021-06-08
Payer: COMMERCIAL

## 2021-06-08 DIAGNOSIS — F41.9 ANXIETY: ICD-10-CM

## 2021-06-08 DIAGNOSIS — M79.7 FIBROMYALGIA: ICD-10-CM

## 2021-06-08 DIAGNOSIS — E66.3 OVERWEIGHT: ICD-10-CM

## 2021-06-08 DIAGNOSIS — Z86.73 H/O TIA (TRANSIENT ISCHEMIC ATTACK) AND STROKE: ICD-10-CM

## 2021-06-08 LAB
ALBUMIN SERPL BCP-MCNC: 4.1 G/DL (ref 3.5–5)
ALP SERPL-CCNC: 55 U/L (ref 46–116)
ALT SERPL W P-5'-P-CCNC: 27 U/L (ref 12–78)
ANION GAP SERPL CALCULATED.3IONS-SCNC: 5 MMOL/L (ref 4–13)
AST SERPL W P-5'-P-CCNC: 13 U/L (ref 5–45)
BILIRUB SERPL-MCNC: 0.8 MG/DL (ref 0.2–1)
BUN SERPL-MCNC: 14 MG/DL (ref 5–25)
CALCIUM SERPL-MCNC: 9.1 MG/DL (ref 8.3–10.1)
CHLORIDE SERPL-SCNC: 108 MMOL/L (ref 100–108)
CHOLEST SERPL-MCNC: 214 MG/DL (ref 50–200)
CO2 SERPL-SCNC: 25 MMOL/L (ref 21–32)
CREAT SERPL-MCNC: 0.74 MG/DL (ref 0.6–1.3)
GFR SERPL CREATININE-BSD FRML MDRD: 96 ML/MIN/1.73SQ M
GLUCOSE P FAST SERPL-MCNC: 80 MG/DL (ref 65–99)
HDLC SERPL-MCNC: 71 MG/DL
LDLC SERPL CALC-MCNC: 132 MG/DL (ref 0–100)
LDLC SERPL DIRECT ASSAY-MCNC: 128 MG/DL (ref 0–100)
NONHDLC SERPL-MCNC: 143 MG/DL
POTASSIUM SERPL-SCNC: 4.1 MMOL/L (ref 3.5–5.3)
PROT SERPL-MCNC: 7.6 G/DL (ref 6.4–8.2)
SODIUM SERPL-SCNC: 138 MMOL/L (ref 136–145)
TRIGL SERPL-MCNC: 56 MG/DL
TSH SERPL DL<=0.05 MIU/L-ACNC: 1.36 UIU/ML (ref 0.36–3.74)

## 2021-06-08 PROCEDURE — 80053 COMPREHEN METABOLIC PANEL: CPT

## 2021-06-08 PROCEDURE — 84443 ASSAY THYROID STIM HORMONE: CPT

## 2021-06-08 PROCEDURE — 36415 COLL VENOUS BLD VENIPUNCTURE: CPT

## 2021-06-08 PROCEDURE — 80061 LIPID PANEL: CPT

## 2021-06-08 PROCEDURE — 83721 ASSAY OF BLOOD LIPOPROTEIN: CPT

## 2021-06-08 NOTE — RESULT ENCOUNTER NOTE
Unstable labs - will review with patient at upcoming appointment  Hyperlipidemia - Worsening  Is pt taking statin?

## 2021-06-10 NOTE — PROGRESS NOTES
Assessment/Plan:  Problem List Items Addressed This Visit        Respiratory    AR (allergic rhinitis)     Stable on Zyrtec p r n  Relevant Medications    meloxicam (MOBIC) 15 mg tablet       Other    H/O TIA (transient ischemic attack) and stroke     Stable  Continue aspirin, statin  Continue Lipitor 10 mg 1 pill nightly  Advise lifestyle modifications  Relevant Medications    atorvastatin (LIPITOR) 10 mg tablet    Fibromyalgia     Stable on Flexeril 10 mg TID P r n  To use sparingly-side effects discussed and Mobic 15mg QD PRN  Encouraged increased exercise, stress relief, massage, and aqua therapy as patient does not want to take any medications that would alter her neurotransmitters  Relevant Medications    cyclobenzaprine (FLEXERIL) 10 mg tablet    meloxicam (MOBIC) 15 mg tablet    Other Relevant Orders    TSH, 3rd generation with Free T4 reflex    Overweight     Worsening  Recommend lifestyle modifications  Relevant Orders    TSH, 3rd generation with Free T4 reflex    Other hyperlipidemia - Primary     Worsening  Continue Lipitor 10 mg 1 pill nightly  Advise lifestyle modifications  Relevant Medications    atorvastatin (LIPITOR) 10 mg tablet    Other Relevant Orders    CBC and differential    Comprehensive metabolic panel    Lipid panel    TSH, 3rd generation with Free T4 reflex    LDL cholesterol, direct    Anxiety     Stable status post weaning Cymbalta 60 mg daily  Relevant Orders    CBC and differential    Comprehensive metabolic panel    TSH, 3rd generation with Free T4 reflex    Neck pain     Stable status post wean Cymbalta 60 mg daily  Continue Mobic 15mg QD PRN               Other Visit Diagnoses     Diabetes mellitus screening        Relevant Orders    Hemoglobin A1C    Screening for cardiovascular condition        Relevant Orders    CBC and differential    Comprehensive metabolic panel    Lipid panel    LDL cholesterol, direct    Myalgia Relevant Orders    Vitamin D 25 hydroxy    BMI 26 0-26 9,adult               Return in about 6 months (around 12/11/2021) for Physical / 6mo- HL, Anxiety, FM, CVA, TIA, s/p PFO, AR, Labs  Future Appointments   Date Time Provider Sari Corona   1/14/2022 10:20 AM Lio Lacy, DO [facility]        Subjective:     [facility] who presents today for a follow-up on her chronic medical conditions  HPI:  Chief Complaint   Patient presents with    Follow-up     no concerns     -- Above per clinical staff and reviewed  --      HPI      Today:      Return in about 6 months (around 6/4/2021) for 6mo- HL, Anxiety, FM, CVA, TIA, s/p PFO, AR, Labs           6mo OV     Overweight - Not Watching diet - eating less sugar and carbs  +Exercising - for 30 minutes, 2-3 times per week       Hyperlipidemia - On Lipitor 10mg QHS       Fibromyalgia / Arthralgias / Myalgias - She is feeling well since D/C Cymbalta - sleep improved, regular BM, no longer has night sweats or vivid dreams  Using Flexeril 10mg TID PRN c benefit - taking a couples times per week during the work week  Myalgias worse c cold weather    She is using Mobic 15mg QD PRN a couple times per week during the work week c benefit          She weaned off Cymbalta 60mg QD due to:      1   Sleep issues - choppy sleep, no longer has crazy dreams, no longer talking in sleep   She feels less wired   She now feels tired at bedtime   She is able to fall asleep easily, has not awoke in the past week, except last night x 1 for nocturia   Sleeping 6-7 hours per night   She can sleep in when her schedule allows   Feels well rested         2   Intermittent achiness - Still achy in shoulders, which has been worsning since she weaned Cymbalta   She has been using more Advil 800mg 1-2 times per day and Tylenol 1000 1-2 times daily as needed c benefit   She would to return to [facility], but she is closed to COVID       3   Sexual dysfunction - Low libido, decreased vaginal sensation   She has not been sexually active recently since D/C Cymbalta         Has neck, shoulder, thoracic pain   Clicking in her spine   Denies trauma   Denies joint redness, swelling, warmth in joint   Using CBD cream helpful            Headaches / Burning Neck Pain - Saw Ortho Dr Alex Leonard, s/p PT  Resolved    Intermittent symptoms of neck pain c weather change    Previously using Voltaren Gel c benefit when Tylenol / Motrin unhelpful   HA improved since PFO repaired       Anxiety - Self-weaned Cymbalta 60mg QD as above and Zoloft 50mg QD  due to gaining weight  She is taking CBD oil instead since   Mood is stable, sleep is improved  No SI/HI/AH/VH   Feels safe at home   Drinking 12oz coffee daily               PHQ-9 Depression Screening    PHQ-9:   Frequency of the following problems over the past two weeks:      Little interest or pleasure in doing things: 0 - not at all  Feeling down, depressed, or hopeless: 0 - not at all  Trouble falling or staying asleep, or sleeping too much: 0 - not at all  Feeling tired or having little energy: 0 - not at all  Poor appetite or overeatin - not at all  Feeling bad about yourself - or that you are a failure or have let yourself or your family down: 0 - not at all  Trouble concentrating on things, such as reading the newspaper or watching television: 0 - not at all  Moving or speaking so slowly that other people could have noticed  Or the opposite - being so fidgety or restless that you have been moving around a lot more than usual: 0 - not at all  Thoughts that you would be better off dead, or of hurting yourself in some way: 0 - not at all  PHQ-2 Score: 0  PHQ-9 Score: 0         ALYCE-7 Flowsheet Screening      Most Recent Value   Over the last two weeks, how often have you been bothered by the following problems?     Feeling nervous, anxious, or on edge  0   Not being able to stop or control worrying  0   Worrying too much about different things  0   Trouble relaxing   0   Being so restless that it's hard to sit still  0   Becoming easily annoyed or irritable   0   Feeling afraid as if something awful might happen  0   How difficult have these problems made it for you to do your work, take care of things at home, or get along with other people? Not difficult at all   ALYCE Score   0           Discolored Toenails - Symptoms for months, unchanged   Left 4th toe, Right 3rd and 4th finger with hyperpigmented vertical stripe   She has been wearing acrylic nails for a long time   Denies itching, pain, bleeding   Derm thinks benign hyperpigmentation   Next appt 6/20 - Overdue      Constipation - Chronic  Improved   Has BM 3 (prevoiusly 2-3) times per week   No longer has nugget stools  Sometimes has stomach twisting  Uses Bentyl PRN rarely - last used 2019  Uses Laxatives - OTC store brand PRN - 3 times per month  She states stool softeners not helpful         Antiphospholipid Syndrome / CVA 4/12/17, TIA 7/19/17 -   +CVA on MRI   +ELIZABETH c Biatrial PFO   Neuro started ASA 81mg and Lipitor 40mg QHS   Thrombotic risk panel showed - Cardiolipin IgM positive, which Neuro believes is a transient finding   Neuro plans to repeat Cardiolipin IgM 7/17 - repeated 9/7/17 and again positive   Next Neuro appt 5/19 / PRN   s/p PFO closure 4/25/17 c Cardio Dr Sue Ya   s/p thrombolytic  at [facility] 2/4[facility]18 c Dr Claudio Samuel - Advises ASA 81mg QD and possible Holter / Loop - pt will discuss c Neuro  Neuro Dr Marcia Winn declined recommendation per patient        TIA 4/19/17- Pt returned to ER with funny feeling in head and LUE (not RUE) numbness   MRI revealed no evidence of CVA   Neuro and Cardio advised Plavix for at least 6 months and ASA 81mg for life   I/P team decreased Lipitor 40mg 1/2 pill QHS as pt was experiencing hair loss  She has been taking Lipitor 10mg QHS regularly         APS - s/p Neuro input   Currently on ASA, D/C Plavix             AR - Stable on Zyrtec PRN  She quit Vaping       Reviewed:   Labs 6/8/21, Ortho 12/11/17, Cardio 11/8/17, Rheum 10/16/17, Heme/Onc 10/4/17, Neuro 5/31/18       Sees Gyn Dr Luann Orellana yearly   Next appt 1/22   Next Pap due 2021      Had Tdap at [facility] 2012 - [facility] recs requested, but not available                The following portions of the patient's history were reviewed and updated as appropriate: allergies, current medications, past family history, past medical history, past social history, past surgical history and problem list       Review of Systems   Constitutional: Negative for appetite change, chills, diaphoresis, fatigue and fever  Respiratory: Negative for chest tightness and shortness of breath  Cardiovascular: Negative for chest pain  Gastrointestinal: Positive for constipation (Chronic)  Negative for abdominal pain, blood in stool, diarrhea, nausea and vomiting  Genitourinary: Negative for dysuria  Musculoskeletal: Positive for arthralgias and neck pain          Current Outpatient Medications   Medication Sig Dispense Refill    acetaminophen (TYLENOL) 500 mg tablet Take 500 mg by mouth every 6 (six) hours as needed      ascorbic acid (VITAMIN C) 500 mg tablet Take 500 mg by mouth daily      aspirin 81 MG tablet Take 81 mg by mouth daily      atorvastatin (LIPITOR) 10 mg tablet Take 1 tablet (10 mg total) by mouth daily at bedtime 90 tablet 1    Calcium Carbonate Antacid 648 MG TABS Take 648 mg by mouth daily      Cannabinoids (FULL SPECTRUM EXTRACT PO) Take 3 mg by mouth daily at bedtime With THC (Hemp)      Cannabinoids (THC FREE) 20 MG/ML LIQD Take 5 mg by mouth daily In AM      cetirizine (ZYRTEC ALLERGY) 10 mg tablet Take 10 mg by mouth daily as needed       Cholecalciferol (D 1000) 1000 units capsule Take 2,000 Units by mouth daily      Coenzyme Q10 300 MG CAPS Take 300 mg by mouth daily      cyclobenzaprine (FLEXERIL) 10 mg tablet Take 1 tablet (10 mg total) by mouth 3 (three) times a day as needed for muscle spasms 90 tablet 0    ferrous sulfate 325 (65 Fe) mg tablet Take 325 mg by mouth every other day       Ibuprofen-diphenhydrAMINE Cit (ADVIL PM PO) Take 1 tablet by mouth daily at bedtime as needed       Magnesium Gluconate 550 MG TABS Take 550 mg by mouth daily       meloxicam (MOBIC) 15 mg tablet Take 1 tablet (15 mg total) by mouth daily 90 tablet 1    Multiple Vitamin (TAB-A-CORWIN) TABS Take 1 tablet by mouth daily      Omega-3 Fatty Acids (FISH OIL) 1,000 mg Take 1,000 mg by mouth daily      zinc gluconate 50 mg tablet Take 50 mg by mouth daily      dicyclomine (BENTYL) 20 mg tablet Take 20 mg by mouth every 6 (six) hours as needed       No current facility-administered medications for this visit  Objective:  /84   Pulse 101   Temp 98 2 °F (36 8 °C)   Resp 14   Ht 5' 5 35" (1 66 m)   Wt 74 3 kg (163 lb 12 8 oz)   SpO2 100%   BMI 26 97 kg/m²    Wt Readings from Last 3 Encounters:   06/11/21 74 3 kg (163 lb 12 8 oz)   12/04/20 73 kg (161 lb)   05/22/20 69 9 kg (154 lb)      BP Readings from Last 3 Encounters:   06/11/21 142/84   12/04/20 98/70   01/10/20 126/76          Physical Exam  Vitals signs and nursing note reviewed  Constitutional:       Appearance: Normal appearance  She is well-developed  HENT:      Head: Normocephalic and atraumatic  Mouth/Throat:      Mouth: Mucous membranes are moist    Eyes:      Conjunctiva/sclera: Conjunctivae normal    Neck:      Musculoskeletal: Neck supple  Thyroid: No thyromegaly  Cardiovascular:      Rate and Rhythm: Normal rate and regular rhythm  Pulses: Normal pulses  Heart sounds: Normal heart sounds  Pulmonary:      Effort: Pulmonary effort is normal       Breath sounds: Normal breath sounds  Abdominal:      General: Bowel sounds are normal  There is no distension  Palpations: Abdomen is soft  There is no mass  Tenderness: There is no abdominal tenderness  There is no guarding or rebound  Musculoskeletal:         General: No swelling  Right lower leg: No edema  Left lower leg: No edema  Neurological:      General: No focal deficit present  Mental Status: She is alert and oriented to person, place, and time  Psychiatric:         Mood and Affect: Mood normal          Behavior: Behavior normal          Thought Content: Thought content normal          Judgment: Judgment normal          Lab Results:      Lab Results   Component Value Date    WBC 5 61 12/03/2020    HGB 12 9 12/03/2020    HCT 40 1 12/03/2020     12/03/2020    TRIG 56 06/08/2021    HDL 71 06/08/2021    LDLDIRECT 128 (H) 06/08/2021    ALT 27 06/08/2021    AST 13 06/08/2021    K 4 1 06/08/2021     06/08/2021    CREATININE 0 74 06/08/2021    BUN 14 06/08/2021    CO2 25 06/08/2021    GLUF 80 06/08/2021    HGBA1C 5 1 12/03/2020     Lab Results   Component Value Date    URICACID 3 6 11/22/2019     Invalid input(s): BASENAME Vitamin D    No results found  POCT Labs      BMI Counseling: Body mass index is 26 97 kg/m²  The BMI is above normal  Nutrition recommendations include encouraging healthy choices of fruits and vegetables  Exercise recommendations include exercising 3-5 times per week  No pharmacotherapy was ordered  Statement Selected

## 2021-06-11 ENCOUNTER — OFFICE VISIT (OUTPATIENT)
Dept: FAMILY MEDICINE CLINIC | Facility: CLINIC | Age: 46
End: 2021-06-11
Payer: COMMERCIAL

## 2021-06-11 VITALS
RESPIRATION RATE: 14 BRPM | SYSTOLIC BLOOD PRESSURE: 142 MMHG | DIASTOLIC BLOOD PRESSURE: 84 MMHG | BODY MASS INDEX: 27.29 KG/M2 | WEIGHT: 163.8 LBS | OXYGEN SATURATION: 100 % | HEIGHT: 65 IN | HEART RATE: 101 BPM | TEMPERATURE: 98.2 F

## 2021-06-11 DIAGNOSIS — J30.9 ALLERGIC RHINITIS, UNSPECIFIED SEASONALITY, UNSPECIFIED TRIGGER: ICD-10-CM

## 2021-06-11 DIAGNOSIS — M79.10 MYALGIA: ICD-10-CM

## 2021-06-11 DIAGNOSIS — F41.9 ANXIETY: ICD-10-CM

## 2021-06-11 DIAGNOSIS — E78.49 OTHER HYPERLIPIDEMIA: Primary | ICD-10-CM

## 2021-06-11 DIAGNOSIS — Z13.6 SCREENING FOR CARDIOVASCULAR CONDITION: ICD-10-CM

## 2021-06-11 DIAGNOSIS — Z86.73 H/O TIA (TRANSIENT ISCHEMIC ATTACK) AND STROKE: ICD-10-CM

## 2021-06-11 DIAGNOSIS — Z13.1 DIABETES MELLITUS SCREENING: ICD-10-CM

## 2021-06-11 DIAGNOSIS — E66.3 OVERWEIGHT: ICD-10-CM

## 2021-06-11 DIAGNOSIS — M79.7 FIBROMYALGIA: ICD-10-CM

## 2021-06-11 DIAGNOSIS — M54.2 NECK PAIN: ICD-10-CM

## 2021-06-11 PROCEDURE — 99214 OFFICE O/P EST MOD 30 MIN: CPT | Performed by: FAMILY MEDICINE

## 2021-06-11 PROCEDURE — 1036F TOBACCO NON-USER: CPT | Performed by: FAMILY MEDICINE

## 2021-06-11 PROCEDURE — 3008F BODY MASS INDEX DOCD: CPT | Performed by: FAMILY MEDICINE

## 2021-06-11 PROCEDURE — 3725F SCREEN DEPRESSION PERFORMED: CPT | Performed by: FAMILY MEDICINE

## 2021-06-11 RX ORDER — MELOXICAM 15 MG/1
15 TABLET ORAL DAILY
Qty: 90 TABLET | Refills: 1 | Status: SHIPPED | OUTPATIENT
Start: 2021-06-11 | End: 2021-06-11 | Stop reason: SDUPTHER

## 2021-06-11 RX ORDER — ATORVASTATIN CALCIUM 10 MG/1
10 TABLET, FILM COATED ORAL
Qty: 90 TABLET | Refills: 1 | Status: SHIPPED | OUTPATIENT
Start: 2021-06-11 | End: 2022-01-14 | Stop reason: SDUPTHER

## 2021-06-11 RX ORDER — ATORVASTATIN CALCIUM 10 MG/1
10 TABLET, FILM COATED ORAL
Qty: 90 TABLET | Refills: 1 | Status: SHIPPED | OUTPATIENT
Start: 2021-06-11 | End: 2021-06-11 | Stop reason: SDUPTHER

## 2021-06-11 RX ORDER — CYCLOBENZAPRINE HCL 10 MG
10 TABLET ORAL 3 TIMES DAILY PRN
Qty: 90 TABLET | Refills: 0 | Status: SHIPPED | OUTPATIENT
Start: 2021-06-11 | End: 2021-06-11 | Stop reason: SDUPTHER

## 2021-06-11 RX ORDER — CYCLOBENZAPRINE HCL 10 MG
10 TABLET ORAL 3 TIMES DAILY PRN
Qty: 90 TABLET | Refills: 0 | Status: SHIPPED | OUTPATIENT
Start: 2021-06-11 | End: 2021-10-19 | Stop reason: SDUPTHER

## 2021-06-11 RX ORDER — MELOXICAM 15 MG/1
15 TABLET ORAL DAILY
Qty: 90 TABLET | Refills: 1 | Status: SHIPPED | OUTPATIENT
Start: 2021-06-11 | End: 2022-01-14 | Stop reason: SDUPTHER

## 2021-06-11 NOTE — ASSESSMENT & PLAN NOTE
Stable  Continue aspirin, statin  Continue Lipitor 10 mg 1 pill nightly  Advise lifestyle modifications

## 2021-06-11 NOTE — ASSESSMENT & PLAN NOTE
Stable on Flexeril 10 mg TID P r n  To use sparingly-side effects discussed and Mobic 15mg QD PRN  Encouraged increased exercise, stress relief, massage, and aqua therapy as patient does not want to take any medications that would alter her neurotransmitters

## 2021-06-11 NOTE — PATIENT INSTRUCTIONS
Cholesterol and Your Health   AMBULATORY CARE:   Cholesterol  is a waxy, fat-like substance  Your body uses cholesterol to make hormones and new cells, and to protect nerves  Cholesterol is made by your body  It also comes from certain foods you eat, such as meat and dairy products  Your healthcare provider can help you set goals for your cholesterol levels  He or she can help you create a plan to meet your goals  Cholesterol level goals: Your cholesterol level goals depend on your risk for heart disease, your age, and your other health conditions  The following are general guidelines:  · Total cholesterol  includes low-density lipoprotein (LDL), high-density lipoprotein (HDL), and triglyceride levels  The total cholesterol level should be lower than 200 mg/dL and is best at about 150 mg/dL  · LDL cholesterol  is called bad cholesterol  because it forms plaque in your arteries  As plaque builds up, your arteries become narrow, and less blood flows through  When plaque decreases blood flow to your heart, you may have chest pain  If plaque completely blocks an artery that brings blood to your heart, you may have a heart attack  Plaque can break off and form blood clots  Blood clots may block arteries in your brain and cause a stroke  The level should be less than 130 mg/dL and is best at about 100 mg/dL  · HDL cholesterol  is called good cholesterol  because it helps remove LDL cholesterol from your arteries  It does this by attaching to LDL cholesterol and carrying it to your liver  Your liver breaks down LDL cholesterol so your body can get rid of it  High levels of HDL cholesterol can help prevent a heart attack and stroke  Low levels of HDL cholesterol can increase your risk for heart disease, heart attack, and stroke  The level should be 60 mg/dL or higher  · Triglycerides  are a type of fat that store energy from foods you eat  High levels of triglycerides also cause plaque buildup   This can increase your risk for a heart attack or stroke  If your triglyceride level is high, your LDL cholesterol level may also be high  The level should be less than 150 mg/dL  What increases your risk for high cholesterol:   · Smoking cigarettes    · Being overweight or obese, or not getting enough exercise    · Drinking large amounts of alcohol    · A medical condition such as hypertension (high blood pressure) or diabetes    · Certain genes passed from your parents to you    · Age older than 65 years    What you need to know about having your cholesterol levels checked: Adults 21to 39years of age should have their cholesterol levels checked every 4 to 6 years  Adults 45 years or older should have their cholesterol checked every 1 to 2 years  You may need your cholesterol checked more often, or at a younger age, if you have risk factors for heart disease  You may also need to have your cholesterol checked more often if you have other health conditions, such as diabetes  Blood tests are used to check cholesterol levels  Blood tests measure your levels of triglycerides, LDL cholesterol, and HDL cholesterol  How healthy fats affect your cholesterol levels:  Healthy fats, also called unsaturated fats, help lower LDL cholesterol and triglyceride levels  Healthy fats include the following:  · Monounsaturated fats  are found in foods such as olive oil, canola oil, avocado, nuts, and olives  · Polyunsaturated fats,  such as omega 3 fats, are found in fish, such as salmon, trout, and tuna  They can also be found in plant foods such as flaxseed, walnuts, and soybeans  How unhealthy fats affect your cholesterol levels:  Unhealthy fats increase LDL cholesterol and triglyceride levels  They are found in foods high in cholesterol, saturated fat, and trans fat:  · Cholesterol  is found in eggs, dairy, and meat  · Saturated fat  is found in butter, cheese, ice cream, whole milk, and coconut oil   Saturated fat is also found in meat, such as sausage, hot dogs, and bologna  · Trans fat  is found in liquid oils and is used in fried and baked foods  Foods that contain trans fats include chips, crackers, muffins, sweet rolls, microwave popcorn, and cookies  Treatment  for high cholesterol will also decrease your risk of heart disease, heart attack, and stroke  Treatment may include any of the following:  · Lifestyle changes  may include food, exercise, weight loss, and quitting smoking  You may also need to decrease the amount of alcohol you drink  Your healthcare provider will want you to start with lifestyle changes  Other treatment may be added if lifestyle changes are not enough  · Medicines  may be given to lower your LDL cholesterol, triglyceride levels, or total cholesterol level  You may need medicines to lower your cholesterol if any of the following is true:     ? You have a history of stroke, TIA, unstable angina, or a heart attack  ? Your LDL cholesterol level is 190 mg/dL or higher  ? You are age 36 to 76 years, have diabetes or heart disease risk factors, and your LDL cholesterol is 70 mg/dL or higher  · Supplements  include fish oil, red yeast rice, and garlic  Fish oil may help lower your triglyceride and LDL cholesterol levels  It may also increase your HDL cholesterol level  Red yeast rice may help decrease your total cholesterol level and LDL cholesterol level  Garlic may help lower your total cholesterol level  Do not take these supplements without talking to your healthcare provider  Food changes you can make to lower your cholesterol levels:  A dietitian can help you create a healthy eating plan  He or she can show you how to read food labels and choose foods low in saturated fat, trans fats, and cholesterol  · Decrease the total amount of fat you eat  Choose lean meats, fat-free or 1% fat milk, and low-fat dairy products, such as yogurt and cheese  Try to limit or avoid red meats  Limit or do not eat fried foods or baked goods, such as cookies  · Replace unhealthy fats with healthy fats  Cook foods in olive oil or canola oil  Choose soft margarines that are low in saturated fat and trans fat  Seeds, nuts, and avocados are other examples of healthy fats  · Eat foods with omega-3 fats  Examples include salmon, tuna, mackerel, walnuts, and flaxseed  Eat fish 2 times per week  Pregnant women should not eat fish that have high levels of mercury, such as shark, swordfish, and rodrigo mackerel  · Increase the amount of high-fiber foods you eat  High-fiber foods can help lower your LDL cholesterol  Aim to get between 20 and 30 grams of fiber each day  Fruits and vegetables are high in fiber  Eat at least 5 servings each day  Other high-fiber foods are whole-grain or whole-wheat breads, pastas, or cereals, and brown rice  Eat 3 ounces of whole-grain foods each day  Increase fiber slowly  You may have abdominal discomfort, bloating, and gas if you add fiber to your diet too quickly  · Eat healthy protein foods  Examples include low-fat dairy products, skinless chicken and turkey, fish, and nuts  · Limit foods and drinks that are high in sugar  Your dietitian or healthcare provider can help you create daily limits for high-sugar foods and drinks  The limit may be lower if you have diabetes or another health condition  Limits can also help you lose weight if needed  Lifestyle changes you can make to lower your cholesterol levels:   · Maintain a healthy weight  Ask your healthcare provider what a healthy weight is for you  Ask him or her to help you create a weight loss plan if needed  Weight loss can decrease your total cholesterol and triglyceride levels  Weight loss may also help keep your blood pressure at a healthy level  · Exercise regularly  Exercise can help lower your total cholesterol level and maintain a healthy weight   Exercise can also help increase your HDL cholesterol level  Work with your healthcare provider to create an exercise program that is right for you  Get at least 30 to 40 minutes of moderate exercise most days of the week  Examples of exercise include brisk walking, swimming, or biking  · Do not smoke  Nicotine and other chemicals in cigarettes and cigars can raise your cholesterol levels  Ask your healthcare provider for information if you currently smoke and need help to quit  E-cigarettes or smokeless tobacco still contain nicotine  Talk to your healthcare provider before you use these products  · Limit or do not drink alcohol  Alcohol can increase your triglyceride levels  Ask your healthcare provider before you drink alcohol  Ask how much is okay for you to drink in 1 day or 1 week  © Copyright [address] [place] [address] Drive Information is for End User's use only and may not be sold, redistributed or otherwise used for commercial purposes  All illustrations and images included in CareNotes® are the copyrighted property of [employer]  or [facility] Haydee [employer]   The above information is an  only  It is not intended as medical advice for individual conditions or treatments  Talk to your doctor, nurse or pharmacist before following any medical regimen to see if it is safe and effective for you

## 2021-10-17 DIAGNOSIS — M79.7 FIBROMYALGIA: ICD-10-CM

## 2021-10-17 DIAGNOSIS — Z86.73 H/O TIA (TRANSIENT ISCHEMIC ATTACK) AND STROKE: ICD-10-CM

## 2021-10-19 RX ORDER — CYCLOBENZAPRINE HCL 10 MG
TABLET ORAL
Qty: 90 TABLET | Refills: 0 | OUTPATIENT
Start: 2021-10-19

## 2021-10-19 RX ORDER — CYCLOBENZAPRINE HCL 10 MG
10 TABLET ORAL 3 TIMES DAILY PRN
Qty: 90 TABLET | Refills: 0 | Status: SHIPPED | OUTPATIENT
Start: 2021-10-19 | End: 2022-01-14 | Stop reason: SDUPTHER

## 2021-10-19 RX ORDER — MELOXICAM 15 MG/1
15 TABLET ORAL DAILY
Qty: 90 TABLET | Refills: 1 | Status: CANCELLED | OUTPATIENT
Start: 2021-10-19

## 2021-10-19 RX ORDER — ATORVASTATIN CALCIUM 10 MG/1
TABLET, FILM COATED ORAL
Qty: 90 TABLET | Refills: 1 | OUTPATIENT
Start: 2021-10-19

## 2021-10-19 RX ORDER — ATORVASTATIN CALCIUM 10 MG/1
10 TABLET, FILM COATED ORAL
Qty: 90 TABLET | Refills: 1 | Status: CANCELLED | OUTPATIENT
Start: 2021-10-19

## 2022-01-11 ENCOUNTER — LAB (OUTPATIENT)
Dept: LAB | Facility: CLINIC | Age: 47
End: 2022-01-11
Payer: COMMERCIAL

## 2022-01-11 DIAGNOSIS — F41.9 ANXIETY: ICD-10-CM

## 2022-01-11 DIAGNOSIS — E78.49 OTHER HYPERLIPIDEMIA: ICD-10-CM

## 2022-01-11 DIAGNOSIS — M79.7 FIBROMYALGIA: ICD-10-CM

## 2022-01-11 DIAGNOSIS — Z13.1 DIABETES MELLITUS SCREENING: ICD-10-CM

## 2022-01-11 DIAGNOSIS — Z13.6 SCREENING FOR CARDIOVASCULAR CONDITION: ICD-10-CM

## 2022-01-11 DIAGNOSIS — E66.3 OVERWEIGHT: ICD-10-CM

## 2022-01-11 DIAGNOSIS — M79.10 MYALGIA: ICD-10-CM

## 2022-01-11 LAB
25(OH)D3 SERPL-MCNC: 64.2 NG/ML (ref 30–100)
ALBUMIN SERPL BCP-MCNC: 3.8 G/DL (ref 3.5–5)
ALP SERPL-CCNC: 54 U/L (ref 46–116)
ALT SERPL W P-5'-P-CCNC: 33 U/L (ref 12–78)
ANION GAP SERPL CALCULATED.3IONS-SCNC: 6 MMOL/L (ref 4–13)
AST SERPL W P-5'-P-CCNC: 16 U/L (ref 5–45)
BASOPHILS # BLD AUTO: 0.05 THOUSANDS/ΜL (ref 0–0.1)
BASOPHILS NFR BLD AUTO: 1 % (ref 0–1)
BILIRUB SERPL-MCNC: 0.77 MG/DL (ref 0.2–1)
BUN SERPL-MCNC: 15 MG/DL (ref 5–25)
CALCIUM SERPL-MCNC: 9 MG/DL (ref 8.3–10.1)
CHLORIDE SERPL-SCNC: 103 MMOL/L (ref 100–108)
CHOLEST SERPL-MCNC: 232 MG/DL
CO2 SERPL-SCNC: 26 MMOL/L (ref 21–32)
CREAT SERPL-MCNC: 0.85 MG/DL (ref 0.6–1.3)
EOSINOPHIL # BLD AUTO: 0.21 THOUSAND/ΜL (ref 0–0.61)
EOSINOPHIL NFR BLD AUTO: 4 % (ref 0–6)
ERYTHROCYTE [DISTWIDTH] IN BLOOD BY AUTOMATED COUNT: 12.1 % (ref 11.6–15.1)
EST. AVERAGE GLUCOSE BLD GHB EST-MCNC: 105 MG/DL
GFR SERPL CREATININE-BSD FRML MDRD: 81 ML/MIN/1.73SQ M
GLUCOSE P FAST SERPL-MCNC: 88 MG/DL (ref 65–99)
HBA1C MFR BLD: 5.3 %
HCT VFR BLD AUTO: 38.2 % (ref 34.8–46.1)
HDLC SERPL-MCNC: 68 MG/DL
HGB BLD-MCNC: 12.3 G/DL (ref 11.5–15.4)
IMM GRANULOCYTES # BLD AUTO: 0.01 THOUSAND/UL (ref 0–0.2)
IMM GRANULOCYTES NFR BLD AUTO: 0 % (ref 0–2)
LDLC SERPL CALC-MCNC: 149 MG/DL (ref 0–100)
LDLC SERPL DIRECT ASSAY-MCNC: 131 MG/DL (ref 0–100)
LYMPHOCYTES # BLD AUTO: 1.57 THOUSANDS/ΜL (ref 0.6–4.47)
LYMPHOCYTES NFR BLD AUTO: 31 % (ref 14–44)
MCH RBC QN AUTO: 29.6 PG (ref 26.8–34.3)
MCHC RBC AUTO-ENTMCNC: 32.2 G/DL (ref 31.4–37.4)
MCV RBC AUTO: 92 FL (ref 82–98)
MONOCYTES # BLD AUTO: 0.56 THOUSAND/ΜL (ref 0.17–1.22)
MONOCYTES NFR BLD AUTO: 11 % (ref 4–12)
NEUTROPHILS # BLD AUTO: 2.66 THOUSANDS/ΜL (ref 1.85–7.62)
NEUTS SEG NFR BLD AUTO: 53 % (ref 43–75)
NONHDLC SERPL-MCNC: 164 MG/DL
NRBC BLD AUTO-RTO: 0 /100 WBCS
PLATELET # BLD AUTO: 317 THOUSANDS/UL (ref 149–390)
PMV BLD AUTO: 10.6 FL (ref 8.9–12.7)
POTASSIUM SERPL-SCNC: 3.9 MMOL/L (ref 3.5–5.3)
PROT SERPL-MCNC: 7.7 G/DL (ref 6.4–8.2)
RBC # BLD AUTO: 4.16 MILLION/UL (ref 3.81–5.12)
SODIUM SERPL-SCNC: 135 MMOL/L (ref 136–145)
TRIGL SERPL-MCNC: 75 MG/DL
TSH SERPL DL<=0.05 MIU/L-ACNC: 1.92 UIU/ML (ref 0.36–3.74)
WBC # BLD AUTO: 5.06 THOUSAND/UL (ref 4.31–10.16)

## 2022-01-11 PROCEDURE — 80061 LIPID PANEL: CPT

## 2022-01-11 PROCEDURE — 80053 COMPREHEN METABOLIC PANEL: CPT

## 2022-01-11 PROCEDURE — 84443 ASSAY THYROID STIM HORMONE: CPT

## 2022-01-11 PROCEDURE — 83721 ASSAY OF BLOOD LIPOPROTEIN: CPT

## 2022-01-11 PROCEDURE — 85025 COMPLETE CBC W/AUTO DIFF WBC: CPT

## 2022-01-11 PROCEDURE — 36415 COLL VENOUS BLD VENIPUNCTURE: CPT

## 2022-01-11 PROCEDURE — 83036 HEMOGLOBIN GLYCOSYLATED A1C: CPT

## 2022-01-11 PROCEDURE — 82306 VITAMIN D 25 HYDROXY: CPT

## 2022-01-12 NOTE — RESULT ENCOUNTER NOTE
Unstable labs - will review with patient at upcoming appointment  Hyperlipidemia - Recommend lifestyle modifications

## 2022-01-14 ENCOUNTER — TELEPHONE (OUTPATIENT)
Dept: ADMINISTRATIVE | Facility: OTHER | Age: 47
End: 2022-01-14

## 2022-01-14 ENCOUNTER — OFFICE VISIT (OUTPATIENT)
Dept: FAMILY MEDICINE CLINIC | Facility: CLINIC | Age: 47
End: 2022-01-14
Payer: COMMERCIAL

## 2022-01-14 VITALS
TEMPERATURE: 98.3 F | RESPIRATION RATE: 18 BRPM | HEART RATE: 97 BPM | WEIGHT: 169.4 LBS | OXYGEN SATURATION: 99 % | HEIGHT: 65 IN | DIASTOLIC BLOOD PRESSURE: 74 MMHG | SYSTOLIC BLOOD PRESSURE: 126 MMHG | BODY MASS INDEX: 28.22 KG/M2

## 2022-01-14 DIAGNOSIS — Z12.11 SCREENING FOR COLORECTAL CANCER: ICD-10-CM

## 2022-01-14 DIAGNOSIS — Z00.00 ANNUAL PHYSICAL EXAM: Primary | ICD-10-CM

## 2022-01-14 DIAGNOSIS — Z23 ENCOUNTER FOR IMMUNIZATION: ICD-10-CM

## 2022-01-14 DIAGNOSIS — M79.7 FIBROMYALGIA: ICD-10-CM

## 2022-01-14 DIAGNOSIS — E66.3 OVERWEIGHT: ICD-10-CM

## 2022-01-14 DIAGNOSIS — Z86.73 H/O TIA (TRANSIENT ISCHEMIC ATTACK) AND STROKE: ICD-10-CM

## 2022-01-14 DIAGNOSIS — E78.49 OTHER HYPERLIPIDEMIA: ICD-10-CM

## 2022-01-14 DIAGNOSIS — F41.9 ANXIETY: ICD-10-CM

## 2022-01-14 DIAGNOSIS — J30.9 ALLERGIC RHINITIS, UNSPECIFIED SEASONALITY, UNSPECIFIED TRIGGER: ICD-10-CM

## 2022-01-14 DIAGNOSIS — Z12.12 SCREENING FOR COLORECTAL CANCER: ICD-10-CM

## 2022-01-14 DIAGNOSIS — M54.2 NECK PAIN: ICD-10-CM

## 2022-01-14 PROCEDURE — 90471 IMMUNIZATION ADMIN: CPT

## 2022-01-14 PROCEDURE — 99396 PREV VISIT EST AGE 40-64: CPT | Performed by: FAMILY MEDICINE

## 2022-01-14 PROCEDURE — 3725F SCREEN DEPRESSION PERFORMED: CPT | Performed by: FAMILY MEDICINE

## 2022-01-14 PROCEDURE — 1036F TOBACCO NON-USER: CPT | Performed by: FAMILY MEDICINE

## 2022-01-14 PROCEDURE — 90715 TDAP VACCINE 7 YRS/> IM: CPT

## 2022-01-14 PROCEDURE — 3008F BODY MASS INDEX DOCD: CPT | Performed by: FAMILY MEDICINE

## 2022-01-14 RX ORDER — MELOXICAM 15 MG/1
15 TABLET ORAL DAILY
Qty: 90 TABLET | Refills: 1 | Status: SHIPPED | OUTPATIENT
Start: 2022-01-14

## 2022-01-14 RX ORDER — CYCLOBENZAPRINE HCL 10 MG
10 TABLET ORAL 3 TIMES DAILY PRN
Qty: 90 TABLET | Refills: 0 | Status: SHIPPED | OUTPATIENT
Start: 2022-01-14

## 2022-01-14 RX ORDER — ATORVASTATIN CALCIUM 10 MG/1
10 TABLET, FILM COATED ORAL
Qty: 90 TABLET | Refills: 1 | Status: SHIPPED | OUTPATIENT
Start: 2022-01-14 | End: 2022-04-12 | Stop reason: SDUPTHER

## 2022-01-14 NOTE — TELEPHONE ENCOUNTER
Upon review of the In Basket request we were able to locate, review, and update the patient chart as requested for Mammogram     Any additional questions or concerns should be emailed to the Practice Liaisons via [email or web address] email, please do not reply via In Basket      Thank you  Tanner Ricci

## 2022-01-14 NOTE — PATIENT INSTRUCTIONS
Wellness Visit for Adults   AMBULATORY CARE:   A wellness visit  is when you see your healthcare provider to get screened for health problems  Your healthcare provider will also give you advice on how to stay healthy  Write down your questions so you remember to ask them  Ask your healthcare provider how often you should have a wellness visit  What happens at a wellness visit:  Your healthcare provider will ask about your health, and your family history of health problems  This includes high blood pressure, heart disease, and cancer  He or she will ask if you have symptoms that concern you, if you smoke, and about your mood  You may also be asked about your intake of medicines, supplements, food, and alcohol  Any of the following may be done:  · Your weight  will be checked  Your height may also be checked so your body mass index (BMI) can be calculated  Your BMI shows if you are at a healthy weight  · Your blood pressure  and heart rate will be checked  Your temperature may also be checked  · Blood and urine tests  may be done  Blood tests may be done to check your cholesterol levels  Abnormal cholesterol levels increase your risk for heart disease and stroke  You may also need a blood or urine test to check for diabetes if you are at increased risk  Urine tests may be done to look for signs of an infection or kidney disease  · A physical exam  includes checking your heartbeat and lungs with a stethoscope  Your healthcare provider may also check your skin to look for sun damage  · Screening tests  may be recommended  A screening test is done to check for diseases that may not cause symptoms  The screening tests you may need depend on your age, gender, family history, and lifestyle habits  For example, colorectal screening may be recommended if you are 48years old or older  Screening tests you need if you are a woman:   · A Pap smear  is used to screen for cervical cancer   Pap smears are usually done every 3 to 5 years depending on your age  You may need them more often if you have had abnormal Pap smear test results in the past  Ask your healthcare provider how often you should have a Pap smear  · A mammogram  is an x-ray of your breasts to screen for breast cancer  Experts recommend mammograms every 2 years starting at age 48 years  You may need a mammogram at age 52 years or younger if you have an increased risk for breast cancer  Talk to your healthcare provider about when you should start having mammograms and how often you need them  Vaccines you may need:   · Get an influenza vaccine  every year  The influenza vaccine protects you from the flu  Several types of viruses cause the flu  The viruses change over time, so new vaccines are made each year  · Get a tetanus-diphtheria (Td) booster vaccine  every 10 years  This vaccine protects you against tetanus and diphtheria  Tetanus is a severe infection that may cause painful muscle spasms and lockjaw  Diphtheria is a severe bacterial infection that causes a thick covering in the back of your mouth and throat  · Get a human papillomavirus (HPV) vaccine  if you are female and aged 23 to 32 or male 23 to 24 and never received it  This vaccine protects you from HPV infection  HPV is the most common infection spread by sexual contact  HPV may also cause vaginal, penile, and anal cancers  · Get a pneumococcal vaccine  if you are aged 72 years or older  The pneumococcal vaccine is an injection given to protect you from pneumococcal disease  Pneumococcal disease is an infection caused by pneumococcal bacteria  The infection may cause pneumonia, meningitis, or an ear infection  · Get a shingles vaccine  if you are 60 or older, even if you have had shingles before  The shingles vaccine is an injection to protect you from the varicella-zoster virus  This is the same virus that causes chickenpox   Shingles is a painful rash that develops in people who had chickenpox or have been exposed to the virus  How to eat healthy:  My Plate is a model for planning healthy meals  It shows the types and amounts of foods that should go on your plate  Fruits and vegetables make up about half of your plate, and grains and protein make up the other half  A serving of dairy is included on the side of your plate  The amount of calories and serving sizes you need depends on your age, gender, weight, and height  Examples of healthy foods are listed below:  · Eat a variety of vegetables  such as dark green, red, and orange vegetables  You can also include canned vegetables low in sodium (salt) and frozen vegetables without added butter or sauces  · Eat a variety of fresh fruits , canned fruit in 100% juice, frozen fruit, and dried fruit  · Include whole grains  At least half of the grains you eat should be whole grains  Examples include whole-wheat bread, wheat pasta, brown rice, and whole-grain cereals such as oatmeal     · Eat a variety of protein foods such as seafood (fish and shellfish), lean meat, and poultry without skin (turkey and chicken)  Examples of lean meats include pork leg, shoulder, or tenderloin, and beef round, sirloin, tenderloin, and extra lean ground beef  Other protein foods include eggs and egg substitutes, beans, peas, soy products, nuts, and seeds  · Choose low-fat dairy products such as skim or 1% milk or low-fat yogurt, cheese, and cottage cheese  · Limit unhealthy fats  such as butter, hard margarine, and shortening  Exercise:  Exercise at least 30 minutes per day on most days of the week  Some examples of exercise include walking, biking, dancing, and swimming  You can also fit in more physical activity by taking the stairs instead of the elevator or parking farther away from stores  Include muscle strengthening activities 2 days each week  Regular exercise provides many health benefits   It helps you manage your weight, and decreases your risk for type 2 diabetes, heart disease, stroke, and high blood pressure  Exercise can also help improve your mood  Ask your healthcare provider about the best exercise plan for you  General health and safety guidelines:   · Do not smoke  Nicotine and other chemicals in cigarettes and cigars can cause lung damage  Ask your healthcare provider for information if you currently smoke and need help to quit  E-cigarettes or smokeless tobacco still contain nicotine  Talk to your healthcare provider before you use these products  · Limit alcohol  A drink of alcohol is 12 ounces of beer, 5 ounces of wine, or 1½ ounces of liquor  · Lose weight, if needed  Being overweight increases your risk of certain health conditions  These include heart disease, high blood pressure, type 2 diabetes, and certain types of cancer  · Protect your skin  Do not sunbathe or use tanning beds  Use sunscreen with a SPF 15 or higher  Apply sunscreen at least 15 minutes before you go outside  Reapply sunscreen every 2 hours  Wear protective clothing, hats, and sunglasses when you are outside  · Drive safely  Always wear your seatbelt  Make sure everyone in your car wears a seatbelt  A seatbelt can save your life if you are in an accident  Do not use your cell phone when you are driving  This could distract you and cause an accident  Pull over if you need to make a call or send a text message  · Practice safe sex  Use latex condoms if are sexually active and have more than one partner  Your healthcare provider may recommend screening tests for sexually transmitted infections (STIs)  · Wear helmets, lifejackets, and protective gear  Always wear a helmet when you ride a bike or motorcycle, go skiing, or play sports that could cause a head injury  Wear protective equipment when you play sports  Wear a lifejacket when you are on a boat or doing water sports      © Copyright [employer] 2021 Information is for End User's use only and may not be sold, redistributed or otherwise used for commercial purposes  All illustrations and images included in CareNotes® are the copyrighted property of [employer]  or [employer]  The above information is an  only  It is not intended as medical advice for individual conditions or treatments  Talk to your doctor, nurse or pharmacist before following any medical regimen to see if it is safe and effective for you  Weight Management   AMBULATORY CARE:   Why it is important to manage your weight:  Being overweight increases your risk of health conditions such as heart disease, high blood pressure, type 2 diabetes, and certain types of cancer  It can also increase your risk for osteoarthritis, sleep apnea, and other respiratory problems  Aim for a slow, steady weight loss  Even a small amount of weight loss can lower your risk of health problems  How to lose weight safely:  A safe and healthy way to lose weight is to eat fewer calories and get regular exercise  · You can lose up about 1 pound a week by decreasing the number of calories you eat by 500 calories each day  You can decrease calories by eating smaller portion sizes or by cutting out high-calorie foods  Read labels to find out how many calories are in the foods you eat  · You can also burn calories with exercise such as walking, swimming, or biking  You will be more likely to keep weight off if you make these changes part of your lifestyle  Exercise at least 30 minutes per day on most days of the week  You can also fit in more physical activity by taking the stairs instead of the elevator or parking farther away from stores  Ask your healthcare provider about the best exercise plan for you  Healthy meal plan for weight management:  A healthy meal plan includes a variety of foods, contains fewer calories, and helps you stay healthy   A healthy meal plan includes the following:     · Eat whole-grain foods more often  A healthy meal plan should contain fiber  Fiber is the part of grains, fruits, and vegetables that is not broken down by your body  Whole-grain foods are healthy and provide extra fiber in your diet  Some examples of whole-grain foods are whole-wheat breads and pastas, oatmeal, brown rice, and bulgur  · Eat a variety of vegetables every day  Include dark, leafy greens such as spinach, kale, krishna greens, and mustard greens  Eat yellow and orange vegetables such as carrots, sweet potatoes, and winter squash  · Eat a variety of fruits every day  Choose fresh or canned fruit (canned in its own juice or light syrup) instead of juice  Fruit juice has very little or no fiber  · Eat low-fat dairy foods  Drink fat-free (skim) milk or 1% milk  Eat fat-free yogurt and low-fat cottage cheese  Try low-fat cheeses such as mozzarella and other reduced-fat cheeses  · Choose meat and other protein foods that are low in fat  Choose beans or other legumes such as split peas or lentils  Choose fish, skinless poultry (chicken or turkey), or lean cuts of red meat (beef or pork)  Before you cook meat or poultry, cut off any visible fat  · Use less fat and oil  Try baking foods instead of frying them  Add less fat, such as margarine, sour cream, regular salad dressing and mayonnaise to foods  Eat fewer high-fat foods  Some examples of high-fat foods include french fries, doughnuts, ice cream, and cakes  · Eat fewer sweets  Limit foods and drinks that are high in sugar  This includes candy, cookies, regular soda, and sweetened drinks  Ways to decrease calories:   · Eat smaller portions  ? Use a small plate with smaller servings  ? Do not eat second helpings  ? When you eat at a restaurant, ask for a box and place half of your meal in the box before you eat  ? Share an entrée with someone else  · Replace high-calorie snacks with healthy, low-calorie snacks  ? Choose fresh fruit, vegetables, fat-free rice cakes, or air-popped popcorn instead of potato chips, nuts, or chocolate  ? Choose water or calorie-free drinks instead of soda or sweetened drinks  · Do not shop for groceries when you are hungry  You may be more likely to make unhealthy food choices  Take a grocery list of healthy foods and shop after you have eaten  · Eat regular meals  Do not skip meals  Skipping meals can lead to overeating later in the day  This can make it harder for you to lose weight  Eat a healthy snack in place of a meal if you do not have time to eat a regular meal  Talk with a dietitian to help you create a meal plan and schedule that is right for you  Other things to consider as you try to lose weight:   · Be aware of situations that may give you the urge to overeat, such as eating while watching television  Find ways to avoid these situations  For example, read a book, go for a walk, or do crafts  · Meet with a weight loss support group or friends who are also trying to lose weight  This may help you stay motivated to continue working on your weight loss goals  © Copyright [employer] 2021 Information is for End User's use only and may not be sold, redistributed or otherwise used for commercial purposes  All illustrations and images included in CareNotes® are the copyrighted property of [employer]  or [employer]  The above information is an  only  It is not intended as medical advice for individual conditions or treatments  Talk to your doctor, nurse or pharmacist before following any medical regimen to see if it is safe and effective for you

## 2022-01-14 NOTE — TELEPHONE ENCOUNTER
----- Message from Tomi Livingston LPN sent at 8/71/2134 10:29 AM EST -----  Regarding: MAMMO  01/14/22 10:29 AM    Hello, our patient [facility] has had Mammogram completed/performed  Please assist in updating the patient chart by making an External outreach to [address] [facility] located in [address] [place] [address]  The date of service is 1/7/22      Thank you,  Tomi Livingston LPN  [facility]  Pranav Encinas

## 2022-01-14 NOTE — PROGRESS NOTES
Assessment/Plan:  Problem List Items Addressed This Visit        Respiratory    AR (allergic rhinitis)     Stable on Zyrtec p r n  Relevant Medications    meloxicam (MOBIC) 15 mg tablet       Other    H/O TIA (transient ischemic attack) and stroke     Stable  Continue aspirin, statin  Continue Lipitor 10 mg 1 pill nightly  Advise lifestyle modifications  Relevant Medications    atorvastatin (LIPITOR) 10 mg tablet    Other Relevant Orders    LDL cholesterol, direct    Fibromyalgia     Stable on Flexeril 10 mg TID P r n  To use sparingly-side effects discussed and Mobic 15mg QD PRN  Encouraged increased exercise, stress relief, massage, and aqua therapy as patient does not want to take any medications that would alter her neurotransmitters  Relevant Medications    meloxicam (MOBIC) 15 mg tablet    cyclobenzaprine (FLEXERIL) 10 mg tablet    Other Relevant Orders    TSH, 3rd generation with Free T4 reflex    Overweight     Worsening  Recommend lifestyle modifications  Relevant Orders    TSH, 3rd generation with Free T4 reflex    Other hyperlipidemia     Worsening  Continue Lipitor 10 mg 1 pill nightly  Advise lifestyle modifications  Relevant Medications    atorvastatin (LIPITOR) 10 mg tablet    Other Relevant Orders    Comprehensive metabolic panel    Lipid panel    TSH, 3rd generation with Free T4 reflex    LDL cholesterol, direct    Anxiety     Stable status post weaning Cymbalta 60 mg daily  Relevant Orders    TSH, 3rd generation with Free T4 reflex    Neck pain     Stable status post wean Cymbalta 60 mg daily  Continue Mobic 15mg QD PRN               Other Visit Diagnoses     Annual physical exam    -  Primary    BMI 28 0-28 9,adult        Encounter for immunization        Relevant Orders    TDAP VACCINE GREATER THAN OR EQUAL TO 6YO IM (Completed)    Screening for colorectal cancer        Relevant Orders    Ambulatory referral to Gastroenterology Return in about 6 months (around 7/14/2022) for 40min - 6mo- HL, Anxiety, FM, CVA, TIA, s/p PFO, AR, Labs  Future Appointments   Date Time Provider Sari Corona   7/15/2022 10:20 AM Leigh Ann Osullivan, DO [facility]        Subjective:     Curly Stone is a 48 y o  female who presents today for a follow-up on her chronic medical conditions  HPI:  Chief Complaint   Patient presents with    Physical Exam     NO CONCERNS     Medication Refill     DISCUSS CHOLESTEROL MED     Labs Only     COMPLETED     HM     MAMM- CG REQUESTED     -- Above per clinical staff and reviewed  --      HPI      Today:      Return in about 6 months (around 12/11/2021) for Physical / 6mo- HL, Anxiety, FM, CVA, TIA, s/p PFO, AR, Labs  6mo OV     Overweight - Not Watching diet - eating less sugar and carbs  She joined [employer] online early 1/22  Previously she was not eating during th day, and was snacking and binge eating when returning from work  She is now meal planning and packing her lunch  Not Exercising - Previously walking for 30 minutes, 2-3 times per week        Hyperlipidemia - On Lipitor 10mg QHS           Fibromyalgia / Arthralgias / Myalgias - She is feeling well since D/C Cymbalta - sleep improved, regular BM, no longer has night sweats or vivid dreams   Using Flexeril 10mg TID PRN c benefit - taking 3-4 nights per week during the work week  Sumit Acron worse c cold weather  Jonesradu Mary Lou is using Mobic 15mg QD PRN a 3 times per week c cold weather          She weaned off Cymbalta 60mg QD due to:      1   Sleep issues - choppy sleep, no longer has crazy dreams, no longer talking in sleep   She feels less wired   She now feels tired at bedtime   She is able to fall asleep easily, has not awoke in the past week, except last night x 1 for nocturia   Sleeping 6-7 hours per night   She can sleep in when her schedule allows   Feels well rested         2   Intermittent achiness - Still achy in shoulders, which has been worsning since she weaned Cymbalta   She has been using more Advil 800mg 1-2 times per day and Tylenol 1000 1-2 times daily as needed c benefit   [place] [facility] [place] to [place]       3   Sexual dysfunction - Low libido, decreased vaginal sensation   She has not been sexually active recently since D/C Cymbalta         Has neck, shoulder, thoracic pain   Clicking in her spine   Denies trauma   Denies joint redness, swelling, warmth in joint   Using CBD cream helpful            Headaches / Burning Neck Pain - Silvio Mendez, s/p PT  Resolved    Intermittent symptoms of neck pain c weather change    Previously using Voltaren Gel c benefit when Tylenol / Motrin unhelpful   HA improved since PFO repaired       Anxiety - Self-weaned Cymbalta 60mg QD as above and Zoloft 50mg QD  due to gaining weight  She is taking CBD oil instead since   Mood is stable, sleep is improved  No SI/HI/AH/VH   Feels safe at home   Drinking 12oz coffee daily            PHQ-2/9 Depression Screening    Little interest or pleasure in doing things: 0 - not at all  Feeling down, depressed, or hopeless: 0 - not at all  Trouble falling or staying asleep, or sleeping too much: 0 - not at all  Feeling tired or having little energy: 0 - not at all  Poor appetite or overeatin - not at all  Feeling bad about yourself - or that you are a failure or have let yourself or your family down: 0 - not at all  Trouble concentrating on things, such as reading the newspaper or watching television: 0 - not at all  Moving or speaking so slowly that other people could have noticed   Or the opposite - being so fidgety or restless that you have been moving around a lot more than usual: 0 - not at all  Thoughts that you would be better off dead, or of hurting yourself in some way: 0 - not at all  PHQ-2 Score: 0  PHQ-2 Interpretation: Negative depression screen  PHQ-9 Score: 0   PHQ-9 Interpretation: No or Minimal depression          ALYCE-7 Flowsheet Screening      Most Recent Value   Over the last 2 weeks, how often have you been bothered by any of the following problems? Feeling nervous, anxious, or on edge 0   Not being able to stop or control worrying 0   Worrying too much about different things 0   Trouble relaxing 0   Being so restless that it is hard to sit still 0   Becoming easily annoyed or irritable 0   Feeling afraid as if something awful might happen 0   ALYCE-7 Total Score 0          Discolored Toenails - Symptoms for months, unchanged   Left 4th toe, Right 3rd and 4th finger with hyperpigmented vertical stripe   She has been wearing acrylic nails for a long time   Denies itching, pain, bleeding   Derm thinks benign hyperpigmentation   Next appt 6/20 - Overdue      Constipation - Chronic  Improved c dietary changes   Has BM daily BM (prevoiusly 2-3) times per week   No longer has nugget stools  Sometimes has stomach twisting  Uses Bentyl PRN rarely - last used 2019  Uses Laxatives - OTC store brand PRN - 3 times per month  She states stool softeners not helpful         Antiphospholipid Syndrome / CVA 4/12/17, TIA 7/19/17 -   +CVA on MRI   +ELIZABETH c Biatrial PFO   Neuro started ASA 81mg and Lipitor 40mg QHS   Thrombotic risk panel showed - Cardiolipin IgM positive, which Neuro believes is a transient finding   Neuro plans to repeat Cardiolipin IgM 7/17 - repeated 9/7/17 and again positive   Next Neuro appt 5/19 / PRN   s/p PFO closure 4/25/17 c Cardio Dr Zo Villanueva   s/p thrombolytic  at [facility] 2/4[facility]18 c Dr Priscila Barnhart - Advises ASA 81mg QD and possible Holter / Loop - pt will discuss c Neuro  Neuro Dr Candace Caldera declined recommendation per patient        TIA 4/19/17- Pt returned to ER with funny feeling in head and LUE (not RUE) numbness   MRI revealed no evidence of CVA   Neuro and Cardio advised Plavix for at least 6 months and ASA 81mg for life    I/P team decreased Lipitor 40mg 1/2 pill QHS as pt was experiencing hair loss  She has been taking Lipitor 10mg QHS regularly         APS - s/p Neuro input   Currently on ASA, D/C Plavix              AR - Stable on Zyrtec PRN  She quit Vaping       Reviewed:   Labs 1/11/22, Ortho 12/11/17, Cardio 11/8/17, Rheum 10/16/17, Heme/Onc 10/4/17, Neuro 5/31/18       Sees Gyn Dr Joseph Douglas yearly   Next appt 1/22   Next Pap due 2026      Had Tdap at [facility] 2012 - med recs requested, but not available      No CRC screening  The following portions of the patient's history were reviewed and updated as appropriate: allergies, current medications, past family history, past medical history, past social history, past surgical history and problem list       Review of Systems   Constitutional: Negative for appetite change, chills, diaphoresis, fatigue and fever  Respiratory: Negative for chest tightness and shortness of breath  Cardiovascular: Negative for chest pain  Gastrointestinal: Negative for abdominal pain, anal bleeding, blood in stool, diarrhea, nausea and vomiting  Genitourinary: Negative for dysuria          Current Outpatient Medications   Medication Sig Dispense Refill    acetaminophen (TYLENOL) 500 mg tablet Take 500 mg by mouth every 6 (six) hours as needed      ascorbic acid (VITAMIN C) 500 mg tablet Take 500 mg by mouth daily      aspirin 81 MG tablet Take 81 mg by mouth daily      atorvastatin (LIPITOR) 10 mg tablet Take 1 tablet (10 mg total) by mouth daily at bedtime 90 tablet 1    Calcium Carbonate Antacid 648 MG TABS Take 648 mg by mouth daily      Cannabinoids (FULL SPECTRUM EXTRACT PO) Take 3 mg by mouth daily at bedtime With THC (Hemp)      Cannabinoids (THC FREE) 20 MG/ML LIQD Take 5 mg by mouth daily In AM      cetirizine (ZYRTEC ALLERGY) 10 mg tablet Take 10 mg by mouth daily as needed       Cholecalciferol (D 1000) 1000 units capsule Take 2,000 Units by mouth daily      Coenzyme Q10 300 MG CAPS Take 300 mg by mouth daily      cyclobenzaprine (FLEXERIL) 10 mg tablet Take 1 tablet (10 mg total) by mouth 3 (three) times a day as needed for muscle spasms 90 tablet 0    dicyclomine (BENTYL) 20 mg tablet Take 20 mg by mouth every 6 (six) hours as needed      ferrous sulfate 325 (65 Fe) mg tablet Take 325 mg by mouth every other day       Ibuprofen-diphenhydrAMINE Cit (ADVIL PM PO) Take 1 tablet by mouth daily at bedtime as needed       Magnesium Gluconate 550 MG TABS Take 550 mg by mouth daily       meloxicam (MOBIC) 15 mg tablet Take 1 tablet (15 mg total) by mouth daily 90 tablet 1    Multiple Vitamin (TAB-A-CORWIN) TABS Take 1 tablet by mouth daily      Omega-3 Fatty Acids (FISH OIL) 1,000 mg Take 1,000 mg by mouth daily      zinc gluconate 50 mg tablet Take 50 mg by mouth daily       No current facility-administered medications for this visit  Objective:  /74   Pulse 97   Temp 98 3 °F (36 8 °C)   Resp 18   Ht 5' 4 76" (1 645 m)   Wt 76 8 kg (169 lb 6 4 oz)   SpO2 99%   BMI 28 40 kg/m²    Wt Readings from Last 3 Encounters:   01/14/22 76 8 kg (169 lb 6 4 oz)   06/11/21 74 3 kg (163 lb 12 8 oz)   12/04/20 73 kg (161 lb)      BP Readings from Last 3 Encounters:   01/14/22 126/74   06/11/21 142/84   12/04/20 98/70          Physical Exam  Vitals and nursing note reviewed  Constitutional:       Appearance: Normal appearance  She is well-developed  HENT:      Head: Normocephalic and atraumatic  Right Ear: Tympanic membrane and ear canal normal  There is impacted cerumen  Left Ear: Tympanic membrane, ear canal and external ear normal       Nose: Nose normal  No congestion or rhinorrhea  Right Sinus: No maxillary sinus tenderness or frontal sinus tenderness  Left Sinus: No maxillary sinus tenderness or frontal sinus tenderness  Mouth/Throat:      Mouth: Mucous membranes are moist       Pharynx: Oropharynx is clear  Uvula midline   No oropharyngeal exudate or posterior oropharyngeal erythema  Tonsils: No tonsillar exudate  Eyes:      Extraocular Movements: Extraocular movements intact  Conjunctiva/sclera: Conjunctivae normal       Pupils: Pupils are equal, round, and reactive to light  Cardiovascular:      Rate and Rhythm: Normal rate and regular rhythm  Pulses: Normal pulses  Heart sounds: Normal heart sounds  Pulmonary:      Effort: Pulmonary effort is normal       Breath sounds: Normal breath sounds  Abdominal:      General: Bowel sounds are normal  There is no distension  Palpations: Abdomen is soft  There is no mass  Tenderness: There is no abdominal tenderness  There is no guarding or rebound  Musculoskeletal:         General: No swelling or tenderness  Cervical back: Neck supple  Right lower leg: No edema  Left lower leg: No edema  Lymphadenopathy:      Cervical: No cervical adenopathy  Skin:     Findings: No rash  Neurological:      General: No focal deficit present  Mental Status: She is alert and oriented to person, place, and time  Psychiatric:         Mood and Affect: Mood normal          Behavior: Behavior normal          Thought Content: Thought content normal          Judgment: Judgment normal          Lab Results:      Lab Results   Component Value Date    WBC 5 06 01/11/2022    HGB 12 3 01/11/2022    HCT 38 2 01/11/2022     01/11/2022    TRIG 75 01/11/2022    HDL 68 01/11/2022    LDLDIRECT 131 (H) 01/11/2022    ALT 33 01/11/2022    AST 16 01/11/2022    K 3 9 01/11/2022     01/11/2022    CREATININE 0 85 01/11/2022    BUN 15 01/11/2022    CO2 26 01/11/2022    GLUF 88 01/11/2022    HGBA1C 5 3 01/11/2022     Lab Results   Component Value Date    URICACID 3 6 11/22/2019     Invalid input(s): BASENAME Vitamin D    No results found  POCT Labs      BMI Counseling: Body mass index is 28 4 kg/m²   The BMI is above normal  Nutrition recommendations include encouraging healthy choices of fruits and vegetables  Exercise recommendations include exercising 3-5 times per week  No pharmacotherapy was ordered  Rationale for BMI follow-up plan is due to patient being overweight or obese  Depression Screening and Follow-up Plan: Patient was screened for depression during today's encounter  They screened negative with a PHQ-2 score of 0  BMI Counseling: Body mass index is 28 4 kg/m²  The BMI is above normal  Nutrition recommendations include 3-5 servings of fruits/vegetables daily  Exercise recommendations include exercising 3-5 times per week

## 2022-05-20 ENCOUNTER — TELEPHONE (OUTPATIENT)
Dept: GASTROENTEROLOGY | Facility: MEDICAL CENTER | Age: 47
End: 2022-05-20

## 2022-05-20 ENCOUNTER — OFFICE VISIT (OUTPATIENT)
Dept: GASTROENTEROLOGY | Facility: MEDICAL CENTER | Age: 47
End: 2022-05-20
Payer: COMMERCIAL

## 2022-05-20 VITALS
DIASTOLIC BLOOD PRESSURE: 82 MMHG | HEART RATE: 85 BPM | TEMPERATURE: 98 F | WEIGHT: 170.8 LBS | BODY MASS INDEX: 28.63 KG/M2 | SYSTOLIC BLOOD PRESSURE: 122 MMHG

## 2022-05-20 DIAGNOSIS — Z12.11 SCREENING FOR COLORECTAL CANCER: Primary | ICD-10-CM

## 2022-05-20 DIAGNOSIS — Z12.12 SCREENING FOR COLORECTAL CANCER: Primary | ICD-10-CM

## 2022-05-20 DIAGNOSIS — K59.00 CONSTIPATION, UNSPECIFIED CONSTIPATION TYPE: ICD-10-CM

## 2022-05-20 PROCEDURE — 99204 OFFICE O/P NEW MOD 45 MIN: CPT | Performed by: INTERNAL MEDICINE

## 2022-05-20 PROCEDURE — 1036F TOBACCO NON-USER: CPT | Performed by: INTERNAL MEDICINE

## 2022-05-20 NOTE — PATIENT INSTRUCTIONS
High Fiber Diet   AMBULATORY CARE:   A high-fiber diet  includes foods that have a high amount of fiber  Fiber is the part of fruits, vegetables, and grains that is not broken down by your body  Fiber keeps your bowel movements regular  Fiber can also help lower your cholesterol level, control blood sugar in people with diabetes, and relieve constipation  Fiber can also help you control your weight because it helps you feel full faster  Most adults should eat 25 to 35 grams of fiber each day  Talk to your dietitian or healthcare provider about the amount of fiber you need  Good sources of fiber:       Foods with at least 4 grams of fiber per serving:      ? to ½ cup of high-fiber cereal (check the nutrition label on the box)    ½ cup of blackberries or raspberries    4 dried prunes    1 cooked artichoke    ½ cup of cooked legumes, such as lentils, or red, kidney, and fischer beans    Foods with 1 to 3 grams of fiber per servin slice of whole-wheat, pumpernickel, or rye bread    ½ cup of cooked brown rice    4 whole-wheat crackers    1 cup of oatmeal    ½ cup of cereal with 1 to 3 grams of fiber per serving (check the nutrition label on the box)    1 small piece of fruit, such as an apple, banana, pear, kiwi, or orange    3 dates    ½ cup of canned apricots, fruit cocktail, peaches, or pears    ½ cup of raw or cooked vegetables, such as carrots, cauliflower, cabbage, spinach, squash, or corn    Ways that you can increase fiber in your diet:   Choose brown or wild rice instead of white rice  Use whole wheat flour in recipes instead of white or all-purpose flour  Add beans and peas to casseroles or soups  Choose fresh fruit and vegetables with peels or skins on instead of juices  Other diet guidelines to follow: Add fiber to your diet slowly  You may have abdominal discomfort, bloating, and gas if you add fiber to your diet too quickly       Drink plenty of liquids as you add fiber to your diet   You may have nausea or develop constipation if you do not drink enough water  Ask how much liquid to drink each day and which liquids are best for you  © Copyright [address] 2022 Information is for End User's use only and may not be sold, redistributed or otherwise used for commercial purposes  All illustrations and images included in CareNotes® are the copyrighted property of A D A M , Inc  or [facility] Haydee Aguillon   The above information is an  only  It is not intended as medical advice for individual conditions or treatments  Talk to your doctor, nurse or pharmacist before following any medical regimen to see if it is safe and effective for you

## 2022-05-20 NOTE — PROGRESS NOTES
[facility] - Outpatient Consultation  [facility] 48 y o  female MRN: [id number]  Encounter: [id number]          ASSESSMENT AND PLAN:  68-year-old female with history of fibromyalgia presents for evaluation  1  Screening for colorectal cancer  She has no prior colonoscopy and is due at this time given her age greater than 37  She has no family history of colon cancer  I discussed the indication, risk and benefit of colonoscopy for colon cancer with her today and she is agreeable to proceeding  - Colonoscopy; Future    2  Constipation, unspecified constipation type  I discussed constipation management with her today including adequate hydration, high-fiber diet  We discussed that she may use MiraLax as needed for constipation  I also recommend that she use MiraLax daily week prior to her colonoscopy to aid in a sufficient bowel preparation given her history of chronic constipation  ______________________________________________________________________    HPI:  [facility] is a 48 y o  with a history of fibromyalgia who presents for evaluation  She reports bowel movements are usually twice weekly  This is been chronic  Bowel movements can be Hendricks 1-2  She reports not drinking sufficient water throughout the day  At times she can have some straining  She has minimal amount of bright red blood per rectum at times with wiping but denies overt hematochezia  She had times used a stool softener, Dulcolax  She has not tried other laxatives or bowel regimens  She otherwise feels well and denies abdominal pain, nausea or vomiting  She reports family history of her parents with benign polyps  She has no family history of colorectal cancer  She has no prior GI surgical history   Her anti-platelet use includes aspirin 81 mg   She has no prior EGD or colonoscopy        REVIEW OF SYSTEMS:    CONSTITUTIONAL: Denies any fever, chills, rigors, and weight loss    HEENT: No earache or tinnitus  Denies hearing loss or visual disturbances  CARDIOVASCULAR: No chest pain or palpitations  RESPIRATORY: Denies any cough, hemoptysis, shortness of breath or dyspnea on exertion  GASTROINTESTINAL: As noted in the History of Present Illness  GENITOURINARY: No problems with urination  Denies any hematuria or dysuria  NEUROLOGIC: No dizziness or vertigo, denies headaches  MUSCULOSKELETAL: Denies any muscle or joint pain  SKIN: Denies skin rashes or itching  ENDOCRINE: Denies excessive thirst  Denies intolerance to heat or cold  PSYCHOSOCIAL: Denies depression or anxiety  Denies any recent memory loss         Historical Information   Past Medical History:   Diagnosis Date    Allergic     Seasonal    Anemia     Anxiety     APS (antiphospholipid syndrome) (HCC)     AR (allergic rhinitis)     H/O TIA (transient ischemic attack) and stroke     Heart murmur     Other constipation     Other hyperlipidemia 2021    Paresthesias in left hand     Resolved 2017     Patent foramen ovale     2018     Single delivery by      Stroke [facility])     TIA (transient ischemic attack) 2017    Visual impairment     Wears glasses     Past Surgical History:   Procedure Laterality Date     SECTION       x 1    PATENT FORAMEN OVALE CLOSURE      [address] History   Social History     Substance and Sexual Activity   Alcohol Use Yes    Alcohol/week: 2 0 standard drinks    Types: 1 Glasses of wine, 1 Cans of beer per week     Social History     Substance and Sexual Activity   Drug Use Never     Social History     Tobacco Use   Smoking Status Former Smoker    Packs/day: 0 25    Years: 3 00    Pack years: 0 75    Types: Cigarettes    Quit date: 1995    Years since quittin 4   Smokeless Tobacco Never Used   Tobacco Comment    Vaping once weekly - minimally     Family History   Problem Relation Age of Onset    Osteoarthritis Mother     Osteoporosis Mother     Arthritis Mother         Spine    Prostate cancer Father 79    Hyperlipidemia Father     Hypertension Father     Osteoarthritis Father     Basal cell carcinoma Father 72    Arthritis Father         THR and TKR    Obesity Sister     No Known Problems Brother     No Known Problems Daughter     No Known Problems Daughter     No Known Problems Daughter     Breast cancer Maternal Aunt 79    Heart attack Maternal Uncle     Cancer Maternal Uncle 68        Bladder Cancer    Arthritis Paternal Grandmother     Heart attack Maternal Grandmother 36    Stroke Maternal Grandmother     Breast cancer Maternal Grandmother 36    Thrombosis Maternal Grandmother     Obesity Family        Meds/Allergies       Current Outpatient Medications:     acetaminophen (TYLENOL) 500 mg tablet    ascorbic acid (VITAMIN C) 500 mg tablet    aspirin 81 MG tablet    atorvastatin (LIPITOR) 10 mg tablet    Calcium Carbonate Antacid 648 MG TABS    Cannabinoids (FULL SPECTRUM EXTRACT PO)    Cannabinoids (THC FREE) 20 MG/ML LIQD    cetirizine (ZYRTEC ALLERGY) 10 mg tablet    Cholecalciferol (D 1000) 1000 units capsule    Coenzyme Q10 300 MG CAPS    cyclobenzaprine (FLEXERIL) 10 mg tablet    dicyclomine (BENTYL) 20 mg tablet    ferrous sulfate 325 (65 Fe) mg tablet    Ibuprofen-diphenhydrAMINE Cit (ADVIL PM PO)    Magnesium Gluconate 550 MG TABS    meloxicam (MOBIC) 15 mg tablet    Multiple Vitamin (TAB-A-CORWIN) TABS    Omega-3 Fatty Acids (FISH OIL) 1,000 mg    zinc gluconate 50 mg tablet    Allergies   Allergen Reactions    Latex Rash           Objective     Blood pressure 122/82, pulse 85, temperature 98 °F (36 7 °C), weight 77 5 kg (170 lb 12 8 oz), not currently breastfeeding  There is no height or weight on file to calculate BMI          PHYSICAL EXAM:      General Appearance:   Alert, cooperative, no distress   HEENT:   Normocephalic, atraumatic, anicteric  Neck:  Supple, symmetrical, trachea midline   Lungs:   Clear to auscultation bilaterally; no rales, rhonchi or wheezing; respirations unlabored    Heart[de-identified]   Regular rate and rhythm; no murmur, rub, or gallop  Abdomen:   Soft, non-tender, non-distended; normal bowel sounds; no masses, no organomegaly    Genitalia:   Deferred    Rectal:   Deferred    Extremities:  No cyanosis, clubbing or edema    Pulses:  2+ and symmetric    Skin:  No jaundice, rashes, or lesions    Lymph nodes:  No palpable cervical lymphadenopathy        Lab Results:   No visits with results within 1 Day(s) from this visit     Latest known visit with results is:   Lab on 01/11/2022   Component Date Value    WBC 01/11/2022 5 06     RBC 01/11/2022 4 16     Hemoglobin 01/11/2022 12 3     Hematocrit 01/11/2022 38 2     MCV 01/11/2022 92     MCH 01/11/2022 29 6     MCHC 01/11/2022 32 2     RDW 01/11/2022 12 1     MPV 01/11/2022 10 6     Platelets 54/17/7471 317     nRBC 01/11/2022 0     Neutrophils Relative 01/11/2022 53     Immat GRANS % 01/11/2022 0     Lymphocytes Relative 01/11/2022 31     Monocytes Relative 01/11/2022 11     Eosinophils Relative 01/11/2022 4     Basophils Relative 01/11/2022 1     Neutrophils Absolute 01/11/2022 2 66     Immature Grans Absolute 01/11/2022 0 01     Lymphocytes Absolute 01/11/2022 1 57     Monocytes Absolute 01/11/2022 0 56     Eosinophils Absolute 01/11/2022 0 21     Basophils Absolute 01/11/2022 0 05     Sodium 01/11/2022 135 (A)    Potassium 01/11/2022 3 9     Chloride 01/11/2022 103     CO2 01/11/2022 26     ANION GAP 01/11/2022 6     BUN 01/11/2022 15     Creatinine 01/11/2022 0 85     Glucose, Fasting 01/11/2022 88     Calcium 01/11/2022 9 0     AST 01/11/2022 16     ALT 01/11/2022 33     Alkaline Phosphatase 01/11/2022 54     Total Protein 01/11/2022 7 7     Albumin 01/11/2022 3 8     Total Bilirubin 01/11/2022 0 77     eGFR 01/11/2022 81     Cholesterol 01/11/2022 232 (A)    Triglycerides 01/11/2022 75     HDL, Direct 01/11/2022 68     LDL Calculated 01/11/2022 149 (A)    Non-HDL-Chol (CHOL-HDL) 01/11/2022 164     TSH 3RD GENERATON 01/11/2022 1 920     Hemoglobin A1C 01/11/2022 5 3     EAG 01/11/2022 105     Vit D, 25-Hydroxy 01/11/2022 64 2     LDL Direct 01/11/2022 131 (A)         Radiology Results:   No results found

## 2022-05-20 NOTE — TELEPHONE ENCOUNTER
WRAP UP      Scheduled date of 9/23/22 (as of today) 5/20/22  Physician performing Dr Machado Share:  Location of procedure  [place]:  Bowel prep reviewed with patient: miralax/dulcolax  Instructions reviewed with patient by: MA  Clearances:

## 2022-08-25 ENCOUNTER — LAB (OUTPATIENT)
Dept: LAB | Facility: CLINIC | Age: 47
End: 2022-08-25
Payer: COMMERCIAL

## 2022-08-25 DIAGNOSIS — E66.3 OVERWEIGHT: ICD-10-CM

## 2022-08-25 DIAGNOSIS — Z86.73 H/O TIA (TRANSIENT ISCHEMIC ATTACK) AND STROKE: ICD-10-CM

## 2022-08-25 DIAGNOSIS — E78.49 OTHER HYPERLIPIDEMIA: ICD-10-CM

## 2022-08-25 DIAGNOSIS — M79.7 FIBROMYALGIA: ICD-10-CM

## 2022-08-25 DIAGNOSIS — F41.9 ANXIETY: ICD-10-CM

## 2022-08-25 LAB
ALBUMIN SERPL BCP-MCNC: 3.9 G/DL (ref 3.5–5)
ALP SERPL-CCNC: 60 U/L (ref 46–116)
ALT SERPL W P-5'-P-CCNC: 24 U/L (ref 12–78)
ANION GAP SERPL CALCULATED.3IONS-SCNC: 8 MMOL/L (ref 4–13)
AST SERPL W P-5'-P-CCNC: 11 U/L (ref 5–45)
BILIRUB SERPL-MCNC: 0.33 MG/DL (ref 0.2–1)
BUN SERPL-MCNC: 15 MG/DL (ref 5–25)
CALCIUM SERPL-MCNC: 9.3 MG/DL (ref 8.3–10.1)
CHLORIDE SERPL-SCNC: 108 MMOL/L (ref 96–108)
CHOLEST SERPL-MCNC: 157 MG/DL
CO2 SERPL-SCNC: 22 MMOL/L (ref 21–32)
CREAT SERPL-MCNC: 0.8 MG/DL (ref 0.6–1.3)
GFR SERPL CREATININE-BSD FRML MDRD: 86 ML/MIN/1.73SQ M
GLUCOSE P FAST SERPL-MCNC: 99 MG/DL (ref 65–99)
HDLC SERPL-MCNC: 65 MG/DL
LDLC SERPL CALC-MCNC: 75 MG/DL (ref 0–100)
LDLC SERPL DIRECT ASSAY-MCNC: 79 MG/DL (ref 0–100)
NONHDLC SERPL-MCNC: 92 MG/DL
POTASSIUM SERPL-SCNC: 4 MMOL/L (ref 3.5–5.3)
PROT SERPL-MCNC: 7.3 G/DL (ref 6.4–8.4)
SODIUM SERPL-SCNC: 138 MMOL/L (ref 135–147)
TRIGL SERPL-MCNC: 83 MG/DL
TSH SERPL DL<=0.05 MIU/L-ACNC: 2.55 UIU/ML (ref 0.45–4.5)

## 2022-08-25 PROCEDURE — 36415 COLL VENOUS BLD VENIPUNCTURE: CPT

## 2022-08-25 PROCEDURE — 80053 COMPREHEN METABOLIC PANEL: CPT

## 2022-08-25 PROCEDURE — 83721 ASSAY OF BLOOD LIPOPROTEIN: CPT

## 2022-08-25 PROCEDURE — 80061 LIPID PANEL: CPT

## 2022-08-25 PROCEDURE — 84443 ASSAY THYROID STIM HORMONE: CPT

## 2022-08-26 ENCOUNTER — OFFICE VISIT (OUTPATIENT)
Dept: FAMILY MEDICINE CLINIC | Facility: CLINIC | Age: 47
End: 2022-08-26
Payer: COMMERCIAL

## 2022-08-26 VITALS
HEIGHT: 65 IN | WEIGHT: 172.4 LBS | BODY MASS INDEX: 28.72 KG/M2 | HEART RATE: 91 BPM | OXYGEN SATURATION: 99 % | DIASTOLIC BLOOD PRESSURE: 86 MMHG | TEMPERATURE: 97.2 F | SYSTOLIC BLOOD PRESSURE: 126 MMHG

## 2022-08-26 DIAGNOSIS — M54.2 NECK PAIN: ICD-10-CM

## 2022-08-26 DIAGNOSIS — E66.3 OVERWEIGHT: ICD-10-CM

## 2022-08-26 DIAGNOSIS — J30.9 ALLERGIC RHINITIS, UNSPECIFIED SEASONALITY, UNSPECIFIED TRIGGER: ICD-10-CM

## 2022-08-26 DIAGNOSIS — Z86.73 H/O TIA (TRANSIENT ISCHEMIC ATTACK) AND STROKE: ICD-10-CM

## 2022-08-26 DIAGNOSIS — E78.49 OTHER HYPERLIPIDEMIA: Primary | ICD-10-CM

## 2022-08-26 DIAGNOSIS — F41.9 ANXIETY: ICD-10-CM

## 2022-08-26 DIAGNOSIS — M79.7 FIBROMYALGIA: ICD-10-CM

## 2022-08-26 DIAGNOSIS — Z13.1 DIABETES MELLITUS SCREENING: ICD-10-CM

## 2022-08-26 DIAGNOSIS — Z13.6 SCREENING FOR CARDIOVASCULAR CONDITION: ICD-10-CM

## 2022-08-26 DIAGNOSIS — M79.10 MYALGIA: ICD-10-CM

## 2022-08-26 PROCEDURE — 3725F SCREEN DEPRESSION PERFORMED: CPT | Performed by: FAMILY MEDICINE

## 2022-08-26 PROCEDURE — 99214 OFFICE O/P EST MOD 30 MIN: CPT | Performed by: FAMILY MEDICINE

## 2022-08-26 RX ORDER — ATORVASTATIN CALCIUM 10 MG/1
10 TABLET, FILM COATED ORAL
Qty: 90 TABLET | Refills: 1 | Status: SHIPPED | OUTPATIENT
Start: 2022-08-26

## 2022-08-26 RX ORDER — CYCLOBENZAPRINE HCL 10 MG
10 TABLET ORAL 3 TIMES DAILY PRN
Qty: 90 TABLET | Refills: 0 | Status: SHIPPED | OUTPATIENT
Start: 2022-08-26

## 2022-08-26 RX ORDER — MELOXICAM 15 MG/1
15 TABLET ORAL DAILY PRN
Qty: 90 TABLET | Refills: 1 | Status: SHIPPED | OUTPATIENT
Start: 2022-08-26

## 2022-08-26 NOTE — PROGRESS NOTES
Assessment/Plan:  Problem List Items Addressed This Visit        Respiratory    AR (allergic rhinitis)     Stable on Zyrtec p r n  Relevant Medications    meloxicam (MOBIC) 15 mg tablet       Other    H/O TIA (transient ischemic attack) and stroke     Stable  Continue Aspirin, Lipitor 10 mg 1 pill nightly  Advise lifestyle modifications  Relevant Medications    atorvastatin (LIPITOR) 10 mg tablet    Other Relevant Orders    Lipid panel    LDL cholesterol, direct    Fibromyalgia     Stable on Flexeril 10 mg TID P r n  To use sparingly-side effects discussed and Mobic 15mg QD PRN  Encouraged increased exercise, stress relief, massage, and aqua therapy as patient does not want to take any medications that would alter her neurotransmitters  Relevant Medications    cyclobenzaprine (FLEXERIL) 10 mg tablet    meloxicam (MOBIC) 15 mg tablet    Other Relevant Orders    TSH, 3rd generation with Free T4 reflex    Vitamin D 25 hydroxy    Overweight     Stable  Recommend lifestyle modifications  Relevant Orders    TSH, 3rd generation with Free T4 reflex    Vitamin D 25 hydroxy    Other hyperlipidemia - Primary     Improved  Continue Lipitor 10 mg 1 pill nightly  Advise lifestyle modifications  Relevant Medications    atorvastatin (LIPITOR) 10 mg tablet    Other Relevant Orders    CBC and differential    Comprehensive metabolic panel    Lipid panel    TSH, 3rd generation with Free T4 reflex    LDL cholesterol, direct    Anxiety     Stable status post weaning Cymbalta 60 mg daily  Relevant Orders    TSH, 3rd generation with Free T4 reflex    Neck pain     Stable status post wean Cymbalta 60 mg daily  Continue Mobic 15mg QD PRN               Other Visit Diagnoses     Diabetes mellitus screening        Relevant Orders    Hemoglobin A1C    Screening for cardiovascular condition        Relevant Orders    CBC and differential    Comprehensive metabolic panel    Lipid panel    LDL cholesterol, direct    Myalgia        Relevant Orders    Vitamin D 25 hydroxy           Return in about 6 months (around 2/26/2023) for Physical / 6mo- HL, Anxiety, FM, CVA, TIA, s/p PFO, AR, Labs  Future Appointments   Date Time Provider Sari Corona   9/23/2022  8:00 AM [facility] [place] [facility] RO[facility]   3/3/2023 10:00 AM Christelle Sanchez,  [facility]        Subjective:     Jennifer Mcgill is a 50 y o  female who presents today for a follow-up on her chronic medical conditions  HPI:  Chief Complaint   Patient presents with    Follow-up    HM     CRC- sched  for 9/23/22     -- Above per clinical staff and reviewed  --      HPI      Today:      Return in about 6 months (around 7/14/2022) for 40min - 6mo- HL, Anxiety, FM, CVA, TIA, s/p PFO, AR, Labs  6mo OV     Overweight - Not Watching diet - eating less sugar and carbs  Exercising - Previously walking for 30 minutes, 2-3 times per week        Hyperlipidemia - On Lipitor 10mg QHS           Fibromyalgia / Arthralgias / Myalgias - She is feeling well since D/C Cymbalta - sleep improved, regular BM, no longer has night sweats or vivid dreams   Using Flexeril 10mg TID PRN c benefit - taking 5 times nights per week during the work week  Antonette Hernandez worse c cold weather   She is using Mobic 15mg QD PRN a 1 time per week        She weaned off Cymbalta 60mg QD due to:      1   Sleep issues - choppy sleep, no longer has crazy dreams, no longer talking in sleep   She feels less wired   She now feels tired at bedtime   She is able to fall asleep easily, has not awoke in the past week, except last night x 1 for nocturia   Sleeping 6-7 hours per night   She can sleep in when her schedule allows   Feels well rested         2   Intermittent achiness - Still achy in shoulders, which has been worsning since she weaned Cymbalta   She has been using more Advil 800mg 1-2 times per day and Tylenol 1000 1-2 times daily as needed c benefit   [facility], but she is closed to [place] [id number]   Sexual dysfunction - Low libido, decreased vaginal sensation   She has not been sexually active recently since D/C Cymbalta         Has neck, shoulder, thoracic pain   Clicking in her spine   Denies trauma   Denies joint redness, swelling, warmth in joint   Using CBD cream helpful            Headaches / Burning Neck [facility], s/p [facility]  Resolved    Intermittent symptoms of neck pain c weather change    Previously using Voltaren Gel c benefit when Tylenol / Motrin unhelpful   HA improved since PFO repaired       Anxiety - Self-weaned Cymbalta 60mg QD as above and Zoloft 50mg QD  due to gaining weight  She is taking CBD oil instead since   Mood is stable, sleep is improved  No SI/HI/AH/VH   Feels safe at home   Drinking 12oz coffee daily            PHQ-2/9 Depression Screening    Little interest or pleasure in doing things: 0 - not at all  Feeling down, depressed, or hopeless: 0 - not at all  Trouble falling or staying asleep, or sleeping too much: 0 - not at all  Feeling tired or having little energy: 0 - not at all  Poor appetite or overeatin - not at all  Feeling bad about yourself - or that you are a failure or have let yourself or your family down: 0 - not at all  Trouble concentrating on things, such as reading the newspaper or watching television: 0 - not at all  Moving or speaking so slowly that other people could have noticed  Or the opposite - being so fidgety or restless that you have been moving around a lot more than usual: 0 - not at all  Thoughts that you would be better off dead, or of hurting yourself in some way: 0 - not at all  PHQ-2 Score: 0  PHQ-2 Interpretation: Negative depression screen  PHQ-9 Score: 0   PHQ-9 Interpretation: No or Minimal depression          ALYCE-7 Flowsheet Screening    Flowsheet Row Most Recent Value   Over the last 2 weeks, how often have you been bothered by any of the following problems? Feeling nervous, anxious, or on edge 0   Not being able to stop or control worrying 0   Worrying too much about different things 0   Trouble relaxing 0   Being so restless that it is hard to sit still 0   Becoming easily annoyed or irritable 0   Feeling afraid as if something awful might happen 0   ALYCE-7 Total Score 0             Discolored Toenails - Symptoms for months, unchanged   Left 4th toe, Right 3rd and 4th finger with hyperpigmented vertical stripe   She has been wearing acrylic nails for a long time   Denies itching, pain, bleeding   Derm thinks benign hyperpigmentation   Next appt 6/20 - Overdue      Constipation - Management per GI Dr Pankaj Urbano  Next appt PRN? Pending colonoscopy 9/23/22  Advises Miralax PRN, which patient has not tried  Chronic  Improved c dietary changes   Has BM daily BM (prevoiusly 2-3) times per week   No longer has nugget stools  Sometimes has stomach twisting  Uses Bentyl PRN rarely - last used 2019  Uses Laxatives - OTC store brand PRN - 3 times per month  She states stool softeners not helpful         Antiphospholipid Syndrome / CVA 4/12/17, TIA 7/19/17 -   +CVA on MRI   +ELIZABETH c Biatrial PFO   Neuro started ASA 81mg and Lipitor 40mg QHS   Thrombotic risk panel showed - Cardiolipin IgM positive, which Neuro believes is a transient finding   Neuro plans to repeat Cardiolipin IgM 7/17 - repeated 9/7/17 and again positive   Next Neuro appt 5/19 / PRN   s/p PFO closure 4/25/17 c Cardio Dr Nixon Diamond   s/p thrombolytic  at [facility] 2/4/18 c Dr Teodoro Cruz - Advises ASA 81mg QD and possible Holter / Loop - pt will discuss c Neuro  Neuro Dr Kulwinder Langford declined recommendation per patient        TIA 4/19/17- Pt returned to ER with funny feeling in head and LUE (not RUE) numbness   MRI revealed no evidence of CVA   Neuro and Cardio advised Plavix for at least 6 months and ASA 81mg for life    I/P team decreased Lipitor 40mg 1/2 pill QHS as pt was experiencing hair loss   She has been taking Lipitor 10mg QHS regularly         APS - s/p Neuro input   Currently on ASA, D/C Plavix              AR - Stable on Zyrtec PRN  She quit Vaping       Reviewed:   Labs 8/25/22, Ortho 12/11/17, Cardio 11/8/17, Rheum 10/16/17, Heme/Onc 10/4/17, Neuro 5/31/18       Sees Gyn Dr Luann Orellana yearly   Next appt 1/22 - Overdue  Next Pap due 2026       No CRC screening previously            The following portions of the patient's history were reviewed and updated as appropriate: allergies, current medications, past family history, past medical history, past social history, past surgical history and problem list       Review of Systems   Constitutional: Negative for appetite change, chills, diaphoresis, fatigue and fever  Respiratory: Negative for chest tightness and shortness of breath  Cardiovascular: Negative for chest pain and palpitations  Gastrointestinal: Positive for constipation  Negative for abdominal pain, blood in stool, diarrhea, nausea and vomiting  Genitourinary: Negative for dysuria          Current Outpatient Medications   Medication Sig Dispense Refill    ascorbic acid (VITAMIN C) 500 mg tablet Take 500 mg by mouth daily      aspirin 81 MG tablet Take 81 mg by mouth daily      atorvastatin (LIPITOR) 10 mg tablet Take 1 tablet (10 mg total) by mouth daily at bedtime 90 tablet 1    Calcium Carbonate Antacid 648 MG TABS Take 648 mg by mouth daily      cetirizine (ZyrTEC) 10 mg tablet Take 10 mg by mouth daily as needed       Cholecalciferol 25 MCG (1000 UT) capsule Take 2,000 Units by mouth daily      Coenzyme Q10 300 MG CAPS Take 300 mg by mouth daily      cyclobenzaprine (FLEXERIL) 10 mg tablet Take 1 tablet (10 mg total) by mouth 3 (three) times a day as needed for muscle spasms 90 tablet 0    ferrous sulfate 325 (65 Fe) mg tablet Take 325 mg by mouth every other day       Magnesium Gluconate 550 MG TABS Take 550 mg by mouth daily       meloxicam (MOBIC) 15 mg tablet Take 1 tablet (15 mg total) by mouth daily as needed for moderate pain 90 tablet 1    Multiple Vitamin (TAB-A-CORWIN) TABS Take 1 tablet by mouth daily      Omega-3 Fatty Acids (FISH OIL) 1,000 mg Take 1,000 mg by mouth daily      zinc gluconate 50 mg tablet Take 50 mg by mouth daily      dicyclomine (BENTYL) 20 mg tablet Take 20 mg by mouth every 6 (six) hours as needed (Patient not taking: Reported on 8/26/2022)       No current facility-administered medications for this visit  Objective:  /86   Pulse 91   Temp (!) 97 2 °F (36 2 °C)   Ht 5' 4 76" (1 645 m)   Wt 78 2 kg (172 lb 6 4 oz)   SpO2 99%   BMI 28 90 kg/m²    Wt Readings from Last 3 Encounters:   08/26/22 78 2 kg (172 lb 6 4 oz)   05/20/22 77 5 kg (170 lb 12 8 oz)   01/14/22 76 8 kg (169 lb 6 4 oz)      BP Readings from Last 3 Encounters:   08/26/22 126/86   05/20/22 122/82   01/14/22 126/74          Physical Exam  Vitals and nursing note reviewed  Constitutional:       Appearance: Normal appearance  She is well-developed  HENT:      Head: Normocephalic and atraumatic  Eyes:      Conjunctiva/sclera: Conjunctivae normal    Neck:      Thyroid: No thyromegaly  Cardiovascular:      Rate and Rhythm: Normal rate and regular rhythm  Pulses: Normal pulses  Heart sounds: Normal heart sounds  Pulmonary:      Effort: Pulmonary effort is normal       Breath sounds: Normal breath sounds  Abdominal:      General: Bowel sounds are normal  There is no distension  Palpations: Abdomen is soft  There is no mass  Tenderness: There is no abdominal tenderness  There is no guarding or rebound  Musculoskeletal:         General: No swelling or tenderness  Cervical back: Neck supple  Right lower leg: No edema  Left lower leg: No edema  Neurological:      General: No focal deficit present  Mental Status: She is alert and oriented to person, place, and time     Psychiatric:         Mood and Affect: Mood normal  Behavior: Behavior normal          Thought Content: Thought content normal          Judgment: Judgment normal          Lab Results:      Lab Results   Component Value Date    WBC 5 06 01/11/2022    HGB 12 3 01/11/2022    HCT 38 2 01/11/2022     01/11/2022    TRIG 83 08/25/2022    HDL 65 08/25/2022    LDLDIRECT 79 08/25/2022    ALT 24 08/25/2022    AST 11 08/25/2022    K 4 0 08/25/2022     08/25/2022    CREATININE 0 80 08/25/2022    BUN 15 08/25/2022    CO2 22 08/25/2022    GLUF 99 08/25/2022    HGBA1C 5 3 01/11/2022     Lab Results   Component Value Date    URICACID 3 6 11/22/2019     Invalid input(s): BASENAME Vitamin D    No results found  POCT Labs        Depression Screening and Follow-up Plan: Patient was screened for depression during today's encounter  They screened negative with a PHQ-2 score of 0

## 2022-09-21 RX ORDER — SODIUM CHLORIDE 9 MG/ML
125 INJECTION, SOLUTION INTRAVENOUS CONTINUOUS
Status: CANCELLED | OUTPATIENT
Start: 2022-09-21

## 2022-09-23 ENCOUNTER — ANESTHESIA EVENT (OUTPATIENT)
Dept: GASTROENTEROLOGY | Facility: MEDICAL CENTER | Age: 47
End: 2022-09-23

## 2022-09-23 ENCOUNTER — HOSPITAL ENCOUNTER (OUTPATIENT)
Dept: GASTROENTEROLOGY | Facility: MEDICAL CENTER | Age: 47
Setting detail: OUTPATIENT SURGERY
End: 2022-09-23
Payer: COMMERCIAL

## 2022-09-23 ENCOUNTER — ANESTHESIA (OUTPATIENT)
Dept: GASTROENTEROLOGY | Facility: MEDICAL CENTER | Age: 47
End: 2022-09-23

## 2022-09-23 VITALS
TEMPERATURE: 98.2 F | BODY MASS INDEX: 28.49 KG/M2 | RESPIRATION RATE: 16 BRPM | OXYGEN SATURATION: 99 % | WEIGHT: 171 LBS | HEIGHT: 65 IN | HEART RATE: 74 BPM | SYSTOLIC BLOOD PRESSURE: 124 MMHG | DIASTOLIC BLOOD PRESSURE: 75 MMHG

## 2022-09-23 DIAGNOSIS — Z12.11 SCREENING FOR COLORECTAL CANCER: ICD-10-CM

## 2022-09-23 DIAGNOSIS — Z12.12 SCREENING FOR COLORECTAL CANCER: ICD-10-CM

## 2022-09-23 DIAGNOSIS — K59.00 CONSTIPATION, UNSPECIFIED CONSTIPATION TYPE: Primary | ICD-10-CM

## 2022-09-23 LAB
EXT PREGNANCY TEST URINE: NEGATIVE
EXT. CONTROL: NORMAL

## 2022-09-23 PROCEDURE — G0121 COLON CA SCRN NOT HI RSK IND: HCPCS | Performed by: INTERNAL MEDICINE

## 2022-09-23 PROCEDURE — 81025 URINE PREGNANCY TEST: CPT | Performed by: ANESTHESIOLOGY

## 2022-09-23 RX ORDER — LIDOCAINE HYDROCHLORIDE 20 MG/ML
INJECTION, SOLUTION EPIDURAL; INFILTRATION; INTRACAUDAL; PERINEURAL AS NEEDED
Status: DISCONTINUED | OUTPATIENT
Start: 2022-09-23 | End: 2022-09-23

## 2022-09-23 RX ORDER — POLYETHYLENE GLYCOL 3350 17 G/17G
17 POWDER, FOR SOLUTION ORAL DAILY
Qty: 255 G | Refills: 0 | Status: SHIPPED | OUTPATIENT
Start: 2022-09-23

## 2022-09-23 RX ORDER — PROPOFOL 10 MG/ML
INJECTION, EMULSION INTRAVENOUS AS NEEDED
Status: DISCONTINUED | OUTPATIENT
Start: 2022-09-23 | End: 2022-09-23

## 2022-09-23 RX ORDER — SODIUM CHLORIDE 9 MG/ML
125 INJECTION, SOLUTION INTRAVENOUS CONTINUOUS
Status: DISCONTINUED | OUTPATIENT
Start: 2022-09-23 | End: 2022-09-27 | Stop reason: HOSPADM

## 2022-09-23 RX ADMIN — PROPOFOL 50 MG: 10 INJECTION, EMULSION INTRAVENOUS at 12:22

## 2022-09-23 RX ADMIN — SODIUM CHLORIDE: 0.9 INJECTION, SOLUTION INTRAVENOUS at 12:15

## 2022-09-23 RX ADMIN — PROPOFOL 50 MG: 10 INJECTION, EMULSION INTRAVENOUS at 12:24

## 2022-09-23 RX ADMIN — PROPOFOL 50 MG: 10 INJECTION, EMULSION INTRAVENOUS at 12:30

## 2022-09-23 RX ADMIN — PROPOFOL 100 MG: 10 INJECTION, EMULSION INTRAVENOUS at 12:19

## 2022-09-23 RX ADMIN — LIDOCAINE HYDROCHLORIDE 60 MG: 20 INJECTION, SOLUTION EPIDURAL; INFILTRATION; INTRACAUDAL; PERINEURAL at 12:19

## 2022-09-23 RX ADMIN — PROPOFOL 50 MG: 10 INJECTION, EMULSION INTRAVENOUS at 12:26

## 2022-09-23 RX ADMIN — PROPOFOL 50 MG: 10 INJECTION, EMULSION INTRAVENOUS at 12:21

## 2022-09-23 NOTE — ANESTHESIA POSTPROCEDURE EVALUATION
Post-Op Assessment Note    CV Status:  Stable    Pain management: adequate     Mental Status:  Alert and awake   Hydration Status:  Euvolemic   PONV Controlled:  Controlled   Airway Patency:  Patent      Post Op Vitals Reviewed: Yes      Staff: Anesthesiologist         No complications documented      BP      Temp      Pulse     Resp      SpO2      /75   Pulse 74   Temp 98 2 °F (36 8 °C) (Temporal)   Resp 16   Ht 5' 5" (1 651 m)   Wt 77 6 kg (171 lb)   LMP 09/21/2022 (Exact Date)   SpO2 99%   BMI 28 46 kg/m²

## 2022-09-23 NOTE — ANESTHESIA PREPROCEDURE EVALUATION
Procedure:  COLONOSCOPY    Relevant Problems   ANESTHESIA (within normal limits)      CARDIO   (+) Other hyperlipidemia      MUSCULOSKELETAL   (+) Fibromyalgia      NEURO/PSYCH   (+) Anxiety   (+) Fibromyalgia   (+) H/O TIA (transient ischemic attack) and stroke        Physical Exam    Airway    Mallampati score: I  TM Distance: >3 FB  Neck ROM: full     Dental       Cardiovascular  Rhythm: regular, Rate: normal,     Pulmonary  Breath sounds clear to auscultation,     Other Findings        Anesthesia Plan  ASA Score- 3     Anesthesia Type- IV sedation with anesthesia with ASA Monitors  Additional Monitors:   Airway Plan:           Plan Factors-Exercise tolerance (METS): <4 METS  Chart reviewed  Patient summary reviewed  Patient is not a current smoker  Induction- intravenous  Postoperative Plan-     Informed Consent- Anesthetic plan and risks discussed with patient

## 2022-09-23 NOTE — H&P
H&P EXAM - Outpatient Endoscopy   Troy Ferreira 50 y o  female MRN: [id number]    [facility] PRE/PST   Encounter: [id number]      History and Physical - [facility]  Troy Ferreira 50 y o  female MRN: [id number]                  HPI: Troy Ferreira is a 50y o  year old female who presents for colon cancer screening      REVIEW OF SYSTEMS: Per the HPI, and otherwise unremarkable      Historical Information   Past Medical History:   Diagnosis Date    Allergic     Seasonal    Anemia     Anxiety     APS (antiphospholipid syndrome) (HCC)     AR (allergic rhinitis)     H/O TIA (transient ischemic attack) and stroke     Heart murmur     Other constipation     Other hyperlipidemia 2021    Paresthesias in left hand     Resolved 2017     Patent foramen ovale     2018     Single delivery by      Stroke ([facility] [address] )     TIA (transient ischemic attack) 2017    Visual impairment     Wears glasses     Past Surgical History:   Procedure Laterality Date     SECTION       x 1    PATENT FORAMEN OVALE CLOSURE  2017    [facility] [place] History   Social History     Substance and Sexual Activity   Alcohol Use Yes    Alcohol/week: 2 0 standard drinks    Types: 1 Glasses of wine, 1 Cans of beer per week     Social History     Substance and Sexual Activity   Drug Use Never     Social History     Tobacco Use   Smoking Status Former Smoker    Packs/day: 0 25    Years: 3 00    Pack years: 0 75    Types: Cigarettes    Quit date: 1995    Years since quittin 7   Smokeless Tobacco Never Used   Tobacco Comment    Vaping once weekly - minimally     Family History   Problem Relation Age of Onset    Osteoarthritis Mother     Osteoporosis Mother     Arthritis Mother         Spine    Prostate cancer Father 79    Hyperlipidemia Father     Hypertension Father     Osteoarthritis Father    Sarkis Marvel Basal cell carcinoma Father 72    Arthritis Father         THR and TKR    Obesity Sister     No Known Problems Brother     No Known Problems Daughter     No Known Problems Daughter     No Known Problems Daughter     Breast cancer Maternal Aunt 79    Heart attack Maternal Uncle     Cancer Maternal Uncle 68        Bladder Cancer    Arthritis Paternal Grandmother     Heart attack Maternal Grandmother 36    Stroke Maternal Grandmother     Breast cancer Maternal Grandmother 36    Thrombosis Maternal Grandmother     Obesity Family        Meds/Allergies     (Not in a hospital admission)      Allergies   Allergen Reactions    Latex Rash       Objective     /84   Pulse 89   Temp 98 2 °F (36 8 °C) (Temporal)   Resp 20   Ht 5' 5" (1 651 m)   Wt 77 6 kg (171 lb)   LMP 09/21/2022 (Exact Date)   SpO2 96%   BMI 28 46 kg/m²       PHYSICAL EXAM    Gen: NAD  CV: RRR  CHEST: Clear  ABD: soft, NT/ND  EXT: no edema      ASSESSMENT/PLAN:  This is a 50y o  year old female here for colonoscopy, and she is stable and optimized for her procedure

## 2023-02-24 ENCOUNTER — TELEPHONE (OUTPATIENT)
Dept: FAMILY MEDICINE CLINIC | Facility: CLINIC | Age: 48
End: 2023-02-24

## 2023-05-04 DIAGNOSIS — Z12.31 ENCOUNTER FOR SCREENING MAMMOGRAM FOR BREAST CANCER: ICD-10-CM

## 2023-05-04 NOTE — RESULT ENCOUNTER NOTE
Normal mammogram.  Advised monthly self-breast exams. Repeat in 1 year.     Message sent to patient via [facility] patient portal.

## 2023-05-05 ENCOUNTER — HOSPITAL ENCOUNTER (OUTPATIENT)
Dept: ULTRASOUND IMAGING | Facility: HOSPITAL | Age: 48
End: 2023-05-05

## 2023-05-05 ENCOUNTER — LAB (OUTPATIENT)
Dept: LAB | Facility: CLINIC | Age: 48
End: 2023-05-05

## 2023-05-05 DIAGNOSIS — Z13.6 SCREENING FOR CARDIOVASCULAR CONDITION: ICD-10-CM

## 2023-05-05 DIAGNOSIS — N92.5 OTHER SPECIFIED IRREGULAR MENSTRUATION: ICD-10-CM

## 2023-05-05 DIAGNOSIS — Z13.1 DIABETES MELLITUS SCREENING: ICD-10-CM

## 2023-05-05 DIAGNOSIS — M79.7 FIBROMYALGIA: ICD-10-CM

## 2023-05-05 DIAGNOSIS — Z86.73 H/O TIA (TRANSIENT ISCHEMIC ATTACK) AND STROKE: ICD-10-CM

## 2023-05-05 DIAGNOSIS — E66.3 OVERWEIGHT: ICD-10-CM

## 2023-05-05 DIAGNOSIS — E78.49 OTHER HYPERLIPIDEMIA: ICD-10-CM

## 2023-05-05 DIAGNOSIS — N92.0 EXCESSIVE AND FREQUENT MENSTRUATION WITH REGULAR CYCLE: ICD-10-CM

## 2023-05-05 DIAGNOSIS — F41.9 ANXIETY: ICD-10-CM

## 2023-05-05 DIAGNOSIS — M79.10 MYALGIA: ICD-10-CM

## 2023-05-05 LAB
25(OH)D3 SERPL-MCNC: 43.1 NG/ML (ref 30–100)
ALBUMIN SERPL BCP-MCNC: 3.8 G/DL (ref 3.5–5)
ALP SERPL-CCNC: 62 U/L (ref 46–116)
ALT SERPL W P-5'-P-CCNC: 31 U/L (ref 12–78)
ANION GAP SERPL CALCULATED.3IONS-SCNC: 5 MMOL/L (ref 4–13)
AST SERPL W P-5'-P-CCNC: 13 U/L (ref 5–45)
BASOPHILS # BLD AUTO: 0.05 THOUSANDS/ÂΜL (ref 0–0.1)
BASOPHILS NFR BLD AUTO: 1 % (ref 0–1)
BILIRUB SERPL-MCNC: 0.42 MG/DL (ref 0.2–1)
BUN SERPL-MCNC: 12 MG/DL (ref 5–25)
CALCIUM SERPL-MCNC: 8.9 MG/DL (ref 8.3–10.1)
CHLORIDE SERPL-SCNC: 106 MMOL/L (ref 96–108)
CHOLEST SERPL-MCNC: 158 MG/DL
CO2 SERPL-SCNC: 27 MMOL/L (ref 21–32)
CREAT SERPL-MCNC: 0.76 MG/DL (ref 0.6–1.3)
EOSINOPHIL # BLD AUTO: 0.25 THOUSAND/ÂΜL (ref 0–0.61)
EOSINOPHIL NFR BLD AUTO: 4 % (ref 0–6)
ERYTHROCYTE [DISTWIDTH] IN BLOOD BY AUTOMATED COUNT: 12 % (ref 11.6–15.1)
GFR SERPL CREATININE-BSD FRML MDRD: 92 ML/MIN/1.73SQ M
GLUCOSE P FAST SERPL-MCNC: 96 MG/DL (ref 65–99)
HCT VFR BLD AUTO: 37.7 % (ref 34.8–46.1)
HDLC SERPL-MCNC: 73 MG/DL
HGB BLD-MCNC: 12.2 G/DL (ref 11.5–15.4)
IMM GRANULOCYTES # BLD AUTO: 0.02 THOUSAND/UL (ref 0–0.2)
IMM GRANULOCYTES NFR BLD AUTO: 0 % (ref 0–2)
LDLC SERPL CALC-MCNC: 69 MG/DL (ref 0–100)
LDLC SERPL DIRECT ASSAY-MCNC: 65 MG/DL (ref 0–100)
LYMPHOCYTES # BLD AUTO: 1.64 THOUSANDS/ÂΜL (ref 0.6–4.47)
LYMPHOCYTES NFR BLD AUTO: 28 % (ref 14–44)
MCH RBC QN AUTO: 30.1 PG (ref 26.8–34.3)
MCHC RBC AUTO-ENTMCNC: 32.4 G/DL (ref 31.4–37.4)
MCV RBC AUTO: 93 FL (ref 82–98)
MONOCYTES # BLD AUTO: 0.55 THOUSAND/ÂΜL (ref 0.17–1.22)
MONOCYTES NFR BLD AUTO: 9 % (ref 4–12)
NEUTROPHILS # BLD AUTO: 3.4 THOUSANDS/ÂΜL (ref 1.85–7.62)
NEUTS SEG NFR BLD AUTO: 58 % (ref 43–75)
NONHDLC SERPL-MCNC: 85 MG/DL
NRBC BLD AUTO-RTO: 0 /100 WBCS
PLATELET # BLD AUTO: 350 THOUSANDS/UL (ref 149–390)
PMV BLD AUTO: 10.5 FL (ref 8.9–12.7)
POTASSIUM SERPL-SCNC: 4.1 MMOL/L (ref 3.5–5.3)
PROT SERPL-MCNC: 7.2 G/DL (ref 6.4–8.4)
RBC # BLD AUTO: 4.05 MILLION/UL (ref 3.81–5.12)
SODIUM SERPL-SCNC: 138 MMOL/L (ref 135–147)
TRIGL SERPL-MCNC: 80 MG/DL
TSH SERPL DL<=0.05 MIU/L-ACNC: 2.41 UIU/ML (ref 0.45–4.5)
WBC # BLD AUTO: 5.91 THOUSAND/UL (ref 4.31–10.16)

## 2023-05-07 LAB
EST. AVERAGE GLUCOSE BLD GHB EST-MCNC: 94 MG/DL
HBA1C MFR BLD: 4.9 %

## 2023-05-11 NOTE — PROGRESS NOTES
Assessment/Plan:  Problem List Items Addressed This Visit        Respiratory    AR (allergic rhinitis)     Stable on Zyrtec p r n  Relevant Medications    meloxicam (MOBIC) 15 mg tablet       Other    H/O TIA (transient ischemic attack) and stroke     Stable  Continue Aspirin, Lipitor 10 mg 1 pill nightly  Advise lifestyle modifications  Relevant Medications    atorvastatin (LIPITOR) 10 mg tablet    Other Relevant Orders    Lipid panel    LDL cholesterol, direct    Fibromyalgia     Stable on Flexeril 10 mg TID P r n  To use sparingly-side effects discussed and Mobic 15mg QD PRN  Encouraged increased exercise, stress relief, massage, and aqua therapy as patient does not want to take any medications that would alter her neurotransmitters  Relevant Medications    cyclobenzaprine (FLEXERIL) 10 mg tablet    meloxicam (MOBIC) 15 mg tablet    Overweight     Worsening  Call Insurance to Ask About GLP-1 Agonist Med Coverage for Overweight, Hyperlipidemia, H/O Stroke -      GLP-1 Agonists - Injectable - Ozempic, Victoza, Byetta, Wegovy, Mounjaro, etc          Relevant Orders    TSH, 3rd generation with Free T4 reflex    Comprehensive metabolic panel    Other hyperlipidemia     Stable  Continue Lipitor 10 mg 1 pill nightly  Advise lifestyle modifications  Relevant Medications    atorvastatin (LIPITOR) 10 mg tablet    Other Relevant Orders    Comprehensive metabolic panel    Lipid panel    TSH, 3rd generation with Free T4 reflex    LDL cholesterol, direct    Anxiety     Stable status post weaning Cymbalta 60 mg daily  Relevant Orders    TSH, 3rd generation with Free T4 reflex    Neck pain     Stable status post wean Cymbalta 60 mg daily  Continue Mobic 15mg QD PRN            Other Visit Diagnoses     Annual physical exam    -  Primary    BMI 29 0-29 9,adult               Return in about 6 months (around 11/12/2023) for 6mo- HL, Anxiety, FM, CVA, TIA, s/p PFO, AR, Labs       Future Appointments   Date Time Provider Sari Corona   6/9/2023  2:30 PM [facility]   11/10/2023 10:20 AM Shirley Gutierrez,  [facility]        Subjective:     Santhosh Johnson is a 50 y o  female who presents today for a follow-up on her chronic medical conditions  HPI:  Chief Complaint   Patient presents with   • Physical Exam     Physical / 6mo- HL, Anxiety, FM, CVA, TIA, s/p PFO, AR, Labs  No new problems or concerns at this time  •      Pt has had an additional covid vaccine, but does not have her card with her today  -- Above per clinical staff and reviewed  --      HPI      Today:      Return in about 6 months (around 2/26/2023) for Physical / 6mo- HL, Anxiety, FM, CVA, TIA, s/p PFO, AR, Labs  6mo OV     Overweight - Watching diet - eating less sugar and carbs  +Exercise - Stair stepper for 30 minutes, 2-3 times per week        Hyperlipidemia - On Lipitor 10mg QHS           Fibromyalgia / Arthralgias / Myalgias - She is feeling well since D/C Cymbalta - sleep improved, regular BM, no longer has night sweats or vivid dreams   Using Flexeril 10mg TID PRN c benefit - taking 2-3 times nights per week during the work week  Toro Kennedy worse c cold weather and heavy lifting at work  [employer] using Mobic 15mg QD PRN a 1 time per week        She weaned off Cymbalta 60mg QD due to:      1   Sleep issues - choppy sleep, no longer has crazy dreams, no longer talking in sleep   She feels less wired   She now feels tired at bedtime   She is able to fall asleep easily, has not awoke in the past week, except last night x 1 for nocturia   Sleeping 6-7 hours per night   She can sleep in when her schedule allows   Feels well rested         2   Intermittent achiness - Still achy in shoulders, which has been worsning since she weaned Cymbalta   She has been using more Advil 800mg 1-2 times per day and Tylenol 1000 1-2 times daily as needed c benefit   She would to return to [facility], but she is closed to [place] [id number]   Sexual dysfunction - Low libido, decreased vaginal sensation   She has not been sexually active recently since D/C Cymbalta         Has neck, shoulder, thoracic pain   Clicking in her spine   Denies trauma   Denies joint redness, swelling, warmth in joint   Using CBD cream helpful            Headaches / Burning Neck Pain - Nanda Castleman Dr Orvan Pleas, s/p PT  Resolved    Intermittent symptoms of neck pain c weather change    Previously using Voltaren Gel c benefit when Tylenol / Motrin unhelpful   HA improved since PFO repaired       Anxiety - Self-weaned Cymbalta 60mg QD as above and Zoloft 50mg QD  due to gaining weight  She is taking CBD oil instead since   Mood is stable, sleep is improved  No SI/HI/AH/VH   Feels safe at home   Drinking 12oz coffee daily            PHQ-2/9 Depression Screening    Little interest or pleasure in doing things: 0 - not at all  Feeling down, depressed, or hopeless: 0 - not at all  Trouble falling or staying asleep, or sleeping too much: 0 - not at all  Feeling tired or having little energy: 0 - not at all  Poor appetite or overeatin - not at all  Feeling bad about yourself - or that you are a failure or have let yourself or your family down: 0 - not at all  Trouble concentrating on things, such as reading the newspaper or watching television: 0 - not at all  Moving or speaking so slowly that other people could have noticed   Or the opposite - being so fidgety or restless that you have been moving around a lot more than usual: 0 - not at all  Thoughts that you would be better off dead, or of hurting yourself in some way: 0 - not at all  PHQ-2 Score: 0  PHQ-2 Interpretation: Negative depression screen  PHQ-9 Score: 0   PHQ-9 Interpretation: No or Minimal depression          ALYCE-7 Flowsheet Screening    Flowsheet Row Most Recent Value   Over the last 2 weeks, how often have you been bothered by any of the following problems? Feeling nervous, anxious, or on edge 0   Not being able to stop or control worrying 0   Worrying too much about different things 0   Trouble relaxing 0   Being so restless that it is hard to sit still 0   Becoming easily annoyed or irritable 0   Feeling afraid as if something awful might happen 0   ALYCE-7 Total Score 0          Discolored Fingernails - Symptoms for months, unchanged   Left 4th toe, Right 3rd and 4th finger with hyperpigmented vertical stripe   She has been wearing acrylic nails for a long time   Denies itching, pain, bleeding   Derm thinks benign hyperpigmentation   Next appt 12/23      Constipation - Management per GI Dr Husam Sanchez  Next appt PRN? Pending repeat colonoscopy 6/9/23 due to poor prep on 9/23/22  Advises Miralax PRN, which patient has not tried  Chronic  Improved c dietary changes   Has BM daily BM (prevoiusly 2-3) times per week   No longer has nugget stools  Sometimes has stomach twisting  Uses Bentyl PRN rarely - last used 2019  Uses Laxatives - OTC store brand PRN - 3 times per month  She states stool softeners not helpful         Antiphospholipid Syndrome / CVA 4/12/17, TIA 7/19/17 -   +CVA on MRI   +ELIZABETH c Biatrial PFO   Neuro started ASA 81mg and Lipitor 40mg QHS   Thrombotic risk panel showed - Cardiolipin IgM positive, which Neuro believes is a transient finding   Neuro plans to repeat Cardiolipin IgM 7/17 - repeated 9/7/17 and again positive   Next Neuro appt 5/19 / PRN   s/p PFO closure 4/25/17 c Cardio Dr Fletcher Sawyer   s/p thrombolytic  at [facility] 2/4[facility]18 c Dr Martha Gonzalez - Advises ASA 81mg QD and possible Holter / Loop - pt will discuss c Neuro  Neuro Dr Amish De La Cruz declined recommendation per patient        TIA 4/19/17- Pt returned to ER with funny feeling in head and LUE (not RUE) numbness   MRI revealed no evidence of CVA   Neuro and Cardio advised Plavix for at least 6 months and ASA 81mg for life    I/P team decreased Lipitor 40mg 1/2 pill QHS as pt was experiencing hair loss   She has been taking Lipitor 10mg QHS regularly         APS - s/p Neuro input   Currently on ASA, D/C Plavix              AR - Stable on Zyrtec PRN  She quit Vaping       Reviewed:   Labs 5/5/23, Ortho 12/11/17, Cardio 11/8/17, Rheum 10/16/17, Heme/Onc 10/4/17, Neuro 5/31/18       Sees Gyn Dr Anthony Martinez yearly   Next appt 5/17/23   Next Pap due 2026  Pending ablation for DUB      No CRC screening previously  Sees Dentist q6 months  Sees Optho q2 years  The following portions of the patient's history were reviewed and updated as appropriate: allergies, current medications, past family history, past medical history, past social history, past surgical history and problem list       Review of Systems   Constitutional: Negative for appetite change, chills, diaphoresis, fatigue and fever  Respiratory: Negative for chest tightness and shortness of breath  Cardiovascular: Negative for chest pain  Gastrointestinal: Positive for constipation  Negative for abdominal pain, blood in stool, diarrhea, nausea and vomiting  Genitourinary: Positive for menstrual problem  Negative for dysuria          Current Outpatient Medications   Medication Sig Dispense Refill   • ascorbic acid (VITAMIN C) 500 mg tablet Take 500 mg by mouth daily     • aspirin 81 MG tablet Take 81 mg by mouth daily     • atorvastatin (LIPITOR) 10 mg tablet Take 1 tablet (10 mg total) by mouth daily at bedtime 90 tablet 1   • Calcium Carbonate Antacid 648 MG TABS Take 648 mg by mouth daily     • cetirizine (ZyrTEC) 10 mg tablet Take 10 mg by mouth daily as needed      • Cholecalciferol 25 MCG (1000 UT) capsule Take 2,000 Units by mouth daily     • Coenzyme Q10 300 MG CAPS Take 300 mg by mouth daily     • cyclobenzaprine (FLEXERIL) 10 mg tablet Take 1 tablet (10 mg total) by mouth 3 (three) times a day as needed for muscle spasms 90 tablet 0   • dicyclomine (BENTYL) 20 mg tablet Take 20 mg by mouth every 6 "(six) hours as needed     • ferrous sulfate 325 (65 Fe) mg tablet Take 325 mg by mouth every other day      • Magnesium Gluconate 550 MG TABS Take 550 mg by mouth daily      • meloxicam (MOBIC) 15 mg tablet Take 1 tablet (15 mg total) by mouth daily as needed for moderate pain 90 tablet 1   • Multiple Vitamin (TAB-A-CORWIN) TABS Take 1 tablet by mouth daily     • Omega-3 Fatty Acids (FISH OIL) 1,000 mg Take 1,000 mg by mouth daily     • zinc gluconate 50 mg tablet Take 50 mg by mouth daily     • polyethylene glycol (GLYCOLAX) 17 GM/SCOOP powder Take 17 g by mouth daily (Patient not taking: Reported on 5/12/2023) 255 g 0     No current facility-administered medications for this visit  Objective:  /82   Pulse 95   Temp (!) 97 °F (36 1 °C)   Resp 16   Ht 5' 5\" (1 651 m)   Wt 79 9 kg (176 lb 3 2 oz)   SpO2 96%   BMI 29 32 kg/m²    Wt Readings from Last 3 Encounters:   05/12/23 79 9 kg (176 lb 3 2 oz)   09/23/22 77 6 kg (171 lb)   08/26/22 78 2 kg (172 lb 6 4 oz)      BP Readings from Last 3 Encounters:   05/12/23 130/82   09/23/22 124/75   08/26/22 126/86          Physical Exam  Vitals and nursing note reviewed  Constitutional:       Appearance: Normal appearance  She is well-developed  HENT:      Head: Normocephalic and atraumatic  Right Ear: Tympanic membrane, ear canal and external ear normal       Left Ear: Tympanic membrane, ear canal and external ear normal       Nose: Nose normal       Right Sinus: No maxillary sinus tenderness or frontal sinus tenderness  Left Sinus: No maxillary sinus tenderness or frontal sinus tenderness  Mouth/Throat:      Mouth: Mucous membranes are moist       Pharynx: Oropharynx is clear  Uvula midline  Tonsils: No tonsillar exudate  Eyes:      Extraocular Movements: Extraocular movements intact  Conjunctiva/sclera: Conjunctivae normal       Pupils: Pupils are equal, round, and reactive to light     Cardiovascular:      Rate and Rhythm: " Normal rate and regular rhythm  Pulses: Normal pulses  Heart sounds: Normal heart sounds  Pulmonary:      Effort: Pulmonary effort is normal       Breath sounds: Normal breath sounds  Abdominal:      General: Bowel sounds are normal  There is no distension  Palpations: Abdomen is soft  There is no mass  Tenderness: There is no abdominal tenderness  There is no guarding or rebound  Musculoskeletal:         General: No swelling or tenderness  Cervical back: Neck supple  Right lower leg: No edema  Left lower leg: No edema  Lymphadenopathy:      Cervical: No cervical adenopathy  Skin:     Findings: No rash  Neurological:      General: No focal deficit present  Mental Status: She is alert and oriented to person, place, and time  Psychiatric:         Mood and Affect: Mood normal          Behavior: Behavior normal          Thought Content: Thought content normal          Judgment: Judgment normal          Lab Results:      Lab Results   Component Value Date    WBC 5 91 05/05/2023    HGB 12 2 05/05/2023    HCT 37 7 05/05/2023     05/05/2023    TRIG 80 05/05/2023    HDL 73 05/05/2023    LDLDIRECT 65 05/05/2023    ALT 31 05/05/2023    AST 13 05/05/2023    K 4 1 05/05/2023     05/05/2023    CREATININE 0 76 05/05/2023    BUN 12 05/05/2023    CO2 27 05/05/2023    GLUF 96 05/05/2023    HGBA1C 4 9 05/05/2023     Lab Results   Component Value Date    URICACID 3 6 11/22/2019     Invalid input(s): BASENAME Vitamin D    US pelvis complete w transvaginal    Result Date: 5/10/2023  Narrative: PELVIC ULTRASOUND, COMPLETE INDICATION:  The patient is 50years old  N92 0: Excessive and frequent menstruation with regular cycle N92 5: Other specified irregular menstruation  COMPARISON: None TECHNIQUE:   Transabdominal pelvic ultrasound was performed in sagittal and transverse planes with a curvilinear transducer    Additional transvaginal imaging was performed to better evaluate the endometrium and ovaries  Imaging included volumetric sweeps as well as traditional still imaging technique  FINDINGS: UTERUS: The uterus is anteverted in position, measuring 11 3 x 4 8 x 6 1 cm  The uterus has a normal contour and heterogeneous echotexture  The cervix appears within normal limits  ENDOMETRIUM: The endometrial echo complex has an AP caliber of 16 0 mm  Its appearance is within normal limits for age and cycle and shows no filling defects  OVARIES/ADNEXA: Right ovary:  2 9 x 2 5 x 2 5 cm  9 5 mL  Left ovary:  3 4 x 3 0 x 1 7 cm  9 2 mL  Ovarian Doppler flow is within normal limits  There is a complex cystic structure in the left ovary measuring up to 1 8 cm with internal reticulation that may represent a hemorrhagic cyst or follicle  There is a 1 0 cm simple cystic structure in the right adnexa in keeping with a paraovarian cyst  OTHER: No free fluid or loculated fluid collections  Impression: Complex cystic structure in the left ovary may represent a hemorrhagic cyst  Recommend follow-up ultrasound in 8 to 12 weeks  The study was marked in EPIC for significant notification  Workstation performed: [id number]        POCT Labs      BMI Counseling: Body mass index is 29 32 kg/m²  The BMI is above normal  Nutrition recommendations include encouraging healthy choices of fruits and vegetables  Exercise recommendations include exercising 3-5 times per week  Rationale for BMI follow-up plan is due to patient being overweight or obese  Depression Screening and Follow-up Plan: Patient was screened for depression during today's encounter  They screened negative with a PHQ-2 score of 0

## 2023-05-12 ENCOUNTER — OFFICE VISIT (OUTPATIENT)
Dept: FAMILY MEDICINE CLINIC | Facility: CLINIC | Age: 48
End: 2023-05-12

## 2023-05-12 VITALS
HEIGHT: 65 IN | DIASTOLIC BLOOD PRESSURE: 82 MMHG | TEMPERATURE: 97 F | RESPIRATION RATE: 16 BRPM | WEIGHT: 176.2 LBS | OXYGEN SATURATION: 96 % | BODY MASS INDEX: 29.36 KG/M2 | SYSTOLIC BLOOD PRESSURE: 130 MMHG | HEART RATE: 95 BPM

## 2023-05-12 DIAGNOSIS — M54.2 NECK PAIN: ICD-10-CM

## 2023-05-12 DIAGNOSIS — Z86.73 H/O TIA (TRANSIENT ISCHEMIC ATTACK) AND STROKE: ICD-10-CM

## 2023-05-12 DIAGNOSIS — F41.9 ANXIETY: ICD-10-CM

## 2023-05-12 DIAGNOSIS — Z00.00 ANNUAL PHYSICAL EXAM: Primary | ICD-10-CM

## 2023-05-12 DIAGNOSIS — E66.3 OVERWEIGHT: ICD-10-CM

## 2023-05-12 DIAGNOSIS — E78.49 OTHER HYPERLIPIDEMIA: ICD-10-CM

## 2023-05-12 DIAGNOSIS — M79.7 FIBROMYALGIA: ICD-10-CM

## 2023-05-12 DIAGNOSIS — J30.9 ALLERGIC RHINITIS, UNSPECIFIED SEASONALITY, UNSPECIFIED TRIGGER: ICD-10-CM

## 2023-05-12 RX ORDER — CYCLOBENZAPRINE HCL 10 MG
10 TABLET ORAL 3 TIMES DAILY PRN
Qty: 90 TABLET | Refills: 0 | Status: SHIPPED | OUTPATIENT
Start: 2023-05-12

## 2023-05-12 RX ORDER — MELOXICAM 15 MG/1
15 TABLET ORAL DAILY PRN
Qty: 90 TABLET | Refills: 1 | Status: SHIPPED | OUTPATIENT
Start: 2023-05-12

## 2023-05-12 RX ORDER — ATORVASTATIN CALCIUM 10 MG/1
10 TABLET, FILM COATED ORAL
Qty: 90 TABLET | Refills: 1 | Status: SHIPPED | OUTPATIENT
Start: 2023-05-12

## 2023-05-12 NOTE — ASSESSMENT & PLAN NOTE
Worsening  Call Insurance to Ask About GLP-1 Agonist Med Coverage for Overweight, Hyperlipidemia, H/O Stroke -     1    GLP-1 Agonists - Injectable - Ozempic, Victoza, Byetta, Wegovy, Mounjaro, etc

## 2023-05-12 NOTE — PATIENT INSTRUCTIONS
Call Insurance to Ask About GLP-1 Agonist Med Coverage for Overweight, Hyperlipidemia, H/O Stroke -      GLP-1 Agonists - Injectable - Ozempic, Victoza, Byetta, Wegovy, Mounjaro, etc          Wellness Visit for Adults   AMBULATORY CARE:   A wellness visit  is when you see your healthcare provider to get screened for health problems  Your healthcare provider will also give you advice on how to stay healthy  Write down your questions so you remember to ask them  Ask your healthcare provider how often you should have a wellness visit  What happens at a wellness visit:  Your healthcare provider will ask about your health, and your family history of health problems  This includes high blood pressure, heart disease, and cancer  He or she will ask if you have symptoms that concern you, if you smoke, and about your mood  You may also be asked about your intake of medicines, supplements, food, and alcohol  Any of the following may be done: Your weight  will be checked  Your height may also be checked so your body mass index (BMI) can be calculated  Your BMI shows if you are at a healthy weight  Your blood pressure  and heart rate will be checked  Your temperature may also be checked  Blood and urine tests  may be done  Blood tests may be done to check your cholesterol levels  Abnormal cholesterol levels increase your risk for heart disease and stroke  You may also need a blood or urine test to check for diabetes if you are at increased risk  Urine tests may be done to look for signs of an infection or kidney disease  A physical exam  includes checking your heartbeat and lungs with a stethoscope  Your healthcare provider may also check your skin to look for sun damage  Screening tests  may be recommended  A screening test is done to check for diseases that may not cause symptoms  The screening tests you may need depend on your age, gender, family history, and lifestyle habits   For example, colorectal screening may be recommended if you are 48years old or older  Screening tests you need if you are a woman:   A Pap smear  is used to screen for cervical cancer  Pap smears are usually done every 3 to 5 years depending on your age  You may need them more often if you have had abnormal Pap smear test results in the past  Ask your healthcare provider how often you should have a Pap smear  A mammogram  is an x-ray of your breasts to screen for breast cancer  Experts recommend mammograms every 2 years starting at age 48 years  You may need a mammogram at age 52 years or younger if you have an increased risk for breast cancer  Talk to your healthcare provider about when you should start having mammograms and how often you need them  Vaccines you may need:   Get an influenza vaccine  every year  The influenza vaccine protects you from the flu  Several types of viruses cause the flu  The viruses change over time, so new vaccines are made each year  Get a tetanus-diphtheria (Td) booster vaccine  every 10 years  This vaccine protects you against tetanus and diphtheria  Tetanus is a severe infection that may cause painful muscle spasms and lockjaw  Diphtheria is a severe bacterial infection that causes a thick covering in the back of your mouth and throat  Get a human papillomavirus (HPV) vaccine  if you are female and aged 23 to 32 or male 23 to 24 and never received it  This vaccine protects you from HPV infection  HPV is the most common infection spread by sexual contact  HPV may also cause vaginal, penile, and anal cancers  Get a pneumococcal vaccine  if you are aged 72 years or older  The pneumococcal vaccine is an injection given to protect you from pneumococcal disease  Pneumococcal disease is an infection caused by pneumococcal bacteria  The infection may cause pneumonia, meningitis, or an ear infection  Get a shingles vaccine  if you are 60 or older, even if you have had shingles before   The shingles vaccine is an injection to protect you from the varicella-zoster virus  This is the same virus that causes chickenpox  Shingles is a painful rash that develops in people who had chickenpox or have been exposed to the virus  How to eat healthy:  My Plate is a model for planning healthy meals  It shows the types and amounts of foods that should go on your plate  Fruits and vegetables make up about half of your plate, and grains and protein make up the other half  A serving of dairy is included on the side of your plate  The amount of calories and serving sizes you need depends on your age, gender, weight, and height  Examples of healthy foods are listed below:  Eat a variety of vegetables  such as dark green, red, and orange vegetables  You can also include canned vegetables low in sodium (salt) and frozen vegetables without added butter or sauces  Eat a variety of fresh fruits , canned fruit in 100% juice, frozen fruit, and dried fruit  Include whole grains  At least half of the grains you eat should be whole grains  Examples include whole-wheat bread, wheat pasta, brown rice, and whole-grain cereals such as oatmeal     Eat a variety of protein foods such as seafood (fish and shellfish), lean meat, and poultry without skin (turkey and chicken)  Examples of lean meats include pork leg, shoulder, or tenderloin, and beef round, sirloin, tenderloin, and extra lean ground beef  Other protein foods include eggs and egg substitutes, beans, peas, soy products, nuts, and seeds  Choose low-fat dairy products such as skim or 1% milk or low-fat yogurt, cheese, and cottage cheese  Limit unhealthy fats  such as butter, hard margarine, and shortening  Exercise:  Exercise at least 30 minutes per day on most days of the week  Some examples of exercise include walking, biking, dancing, and swimming   You can also fit in more physical activity by taking the stairs instead of the elevator or parking farther away from stores  Include muscle strengthening activities 2 days each week  Regular exercise provides many health benefits  It helps you manage your weight, and decreases your risk for type 2 diabetes, heart disease, stroke, and high blood pressure  Exercise can also help improve your mood  Ask your healthcare provider about the best exercise plan for you  General health and safety guidelines:   Do not smoke  Nicotine and other chemicals in cigarettes and cigars can cause lung damage  Ask your healthcare provider for information if you currently smoke and need help to quit  E-cigarettes or smokeless tobacco still contain nicotine  Talk to your healthcare provider before you use these products  Limit alcohol  A drink of alcohol is 12 ounces of beer, 5 ounces of wine, or 1½ ounces of liquor  Lose weight, if needed  Being overweight increases your risk of certain health conditions  These include heart disease, high blood pressure, type 2 diabetes, and certain types of cancer  Protect your skin  Do not sunbathe or use tanning beds  Use sunscreen with a SPF 15 or higher  Apply sunscreen at least 15 minutes before you go outside  Reapply sunscreen every 2 hours  Wear protective clothing, hats, and sunglasses when you are outside  Drive safely  Always wear your seatbelt  Make sure everyone in your car wears a seatbelt  A seatbelt can save your life if you are in an accident  Do not use your cell phone when you are driving  This could distract you and cause an accident  Pull over if you need to make a call or send a text message  Practice safe sex  Use latex condoms if are sexually active and have more than one partner  Your healthcare provider may recommend screening tests for sexually transmitted infections (STIs)  Wear helmets, lifejackets, and protective gear  Always wear a helmet when you ride a bike or motorcycle, go skiing, or play sports that could cause a head injury   Wear protective equipment when you play sports  Wear a lifejacket when you are on a boat or doing water sports  © Copyright [facility] 2022 Information is for End User's use only and may not be sold, redistributed or otherwise used for commercial purposes  The above information is an  only  It is not intended as medical advice for individual conditions or treatments  Talk to your doctor, nurse or pharmacist before following any medical regimen to see if it is safe and effective for you  Weight Management   AMBULATORY CARE:   Why it is important to manage your weight:  Being overweight increases your risk of health conditions such as heart disease, high blood pressure, type 2 diabetes, and certain types of cancer  It can also increase your risk for osteoarthritis, sleep apnea, and other respiratory problems  Aim for a slow, steady weight loss  Even a small amount of weight loss can lower your risk of health problems  Risks of being overweight:  Extra weight can cause many health problems, including the following:  Diabetes (high blood sugar level)    High blood pressure or high cholesterol    Heart disease    Stroke    Gallbladder or liver disease    Cancer of the colon, breast, prostate, liver, or kidney    Sleep apnea    Arthritis or gout    Screening  is done to check for health conditions before you have signs or symptoms  If you are 28to 79years old, your blood sugar level may be checked every 3 years for signs of prediabetes or diabetes  Your healthcare provider will check your blood pressure at each visit  High blood pressure can lead to a stroke or other problems  Your provider may check for signs of heart disease, cancer, or other health problems  How to lose weight safely:  A safe and healthy way to lose weight is to eat fewer calories and get regular exercise  You can lose up about 1 pound a week by decreasing the number of calories you eat by 500 calories each day   You can decrease calories by eating smaller portion sizes or by cutting out high-calorie foods  Read labels to find out how many calories are in the foods you eat  You can also burn calories with exercise such as walking, swimming, or biking  You will be more likely to keep weight off if you make these changes part of your lifestyle  Exercise at least 30 minutes per day on most days of the week  You can also fit in more physical activity by taking the stairs instead of the elevator or parking farther away from stores  Ask your healthcare provider about the best exercise plan for you  Healthy meal plan for weight management:  A healthy meal plan includes a variety of foods, contains fewer calories, and helps you stay healthy  A healthy meal plan includes the following:     Eat whole-grain foods more often  A healthy meal plan should contain fiber  Fiber is the part of grains, fruits, and vegetables that is not broken down by your body  Whole-grain foods are healthy and provide extra fiber in your diet  Some examples of whole-grain foods are whole-wheat breads and pastas, oatmeal, brown rice, and bulgur  Eat a variety of vegetables every day  Include dark, leafy greens such as spinach, kale, krishna greens, and mustard greens  Eat yellow and orange vegetables such as carrots, sweet potatoes, and winter squash  Eat a variety of fruits every day  Choose fresh or canned fruit (canned in its own juice or light syrup) instead of juice  Fruit juice has very little or no fiber  Eat low-fat dairy foods  Drink fat-free (skim) milk or 1% milk  Eat fat-free yogurt and low-fat cottage cheese  Try low-fat cheeses such as mozzarella and other reduced-fat cheeses  Choose meat and other protein foods that are low in fat  Choose beans or other legumes such as split peas or lentils  Choose fish, skinless poultry (chicken or turkey), or lean cuts of red meat (beef or pork)  Before you cook meat or poultry, cut off any visible fat  Use less fat and oil  Try baking foods instead of frying them  Add less fat, such as margarine, sour cream, regular salad dressing and mayonnaise to foods  Eat fewer high-fat foods  Some examples of high-fat foods include french fries, doughnuts, ice cream, and cakes  Eat fewer sweets  Limit foods and drinks that are high in sugar  This includes candy, cookies, regular soda, and sweetened drinks  Ways to decrease calories:   Eat smaller portions  Use a small plate with smaller servings  Do not eat second helpings  When you eat at a restaurant, ask for a box and place half of your meal in the box before you eat  Share an entrée with someone else  Replace high-calorie snacks with healthy, low-calorie snacks  Choose fresh fruit, vegetables, fat-free rice cakes, or air-popped popcorn instead of potato chips, nuts, or chocolate  Choose water or calorie-free drinks instead of soda or sweetened drinks  Do not shop for groceries when you are hungry  You may be more likely to make unhealthy food choices  Take a grocery list of healthy foods and shop after you have eaten  Eat regular meals  Do not skip meals  Skipping meals can lead to overeating later in the day  This can make it harder for you to lose weight  Eat a healthy snack in place of a meal if you do not have time to eat a regular meal  Talk with a dietitian to help you create a meal plan and schedule that is right for you  Other things to consider as you try to lose weight:   Be aware of situations that may give you the urge to overeat, such as eating while watching television  Find ways to avoid these situations  For example, read a book, go for a walk, or do crafts  Meet with a weight loss support group or friends who are also trying to lose weight  This may help you stay motivated to continue working on your weight loss goals      © Copyright T[employer] Leyden 2022 Information is for End User's use only and may not be sold, redistributed or otherwise used for commercial purposes  The above information is an  only  It is not intended as medical advice for individual conditions or treatments  Talk to your doctor, nurse or pharmacist before following any medical regimen to see if it is safe and effective for you  Cholesterol and Your Health   AMBULATORY CARE:   Cholesterol  is a waxy, fat-like substance  Your body uses cholesterol to make hormones and new cells, and to protect nerves  Cholesterol is made by your body  It also comes from certain foods you eat, such as meat and dairy products  Your healthcare provider can help you set goals for your cholesterol levels  He or she can help you create a plan to meet your goals  Cholesterol level goals: Your cholesterol level goals depend on your risk for heart disease, your age, and your other health conditions  The following are general guidelines: Total cholesterol  includes low-density lipoprotein (LDL), high-density lipoprotein (HDL), and triglyceride levels  The total cholesterol level should be lower than 200 mg/dL and is best at about 150 mg/dL  LDL cholesterol  is called bad cholesterol  because it forms plaque in your arteries  As plaque builds up, your arteries become narrow, and less blood flows through  When plaque decreases blood flow to your heart, you may have chest pain  If plaque completely blocks an artery that brings blood to your heart, you may have a heart attack  Plaque can break off and form blood clots  Blood clots may block arteries in your brain and cause a stroke  The level should be less than 130 mg/dL and is best at about 100 mg/dL  HDL cholesterol  is called good cholesterol  because it helps remove LDL cholesterol from your arteries  It does this by attaching to LDL cholesterol and carrying it to your liver  Your liver breaks down LDL cholesterol so your body can get rid of it   High levels of HDL cholesterol can help prevent a heart attack and stroke  Low levels of HDL cholesterol can increase your risk for heart disease, heart attack, and stroke  The level should be 60 mg/dL or higher  Triglycerides  are a type of fat that store energy from foods you eat  High levels of triglycerides also cause plaque buildup  This can increase your risk for a heart attack or stroke  If your triglyceride level is high, your LDL cholesterol level may also be high  The level should be less than 150 mg/dL  Any of the following can increase your risk for high cholesterol:   Smoking cigarettes    Being overweight or obese, or not getting enough exercise    Drinking large amounts of alcohol    A medical condition such as hypertension (high blood pressure) or diabetes    Certain genes passed from your parents to you    Age older than 65 years    What you need to know about having your cholesterol levels checked: Adults 21to 39years of age should have their cholesterol levels checked every 4 to 6 years  Adults 45 years or older should have their cholesterol checked every 1 to 2 years  You may need your cholesterol checked more often, or at a younger age, if you have risk factors for heart disease  You may also need to have your cholesterol checked more often if you have other health conditions, such as diabetes  Blood tests are used to check cholesterol levels  Blood tests measure your levels of triglycerides, LDL cholesterol, and HDL cholesterol  How healthy fats affect your cholesterol levels:  Healthy fats, also called unsaturated fats, help lower LDL cholesterol and triglyceride levels  Healthy fats include the following:  Monounsaturated fats  are found in foods such as olive oil, canola oil, avocado, nuts, and olives  Polyunsaturated fats,  such as omega 3 fats, are found in fish, such as salmon, trout, and tuna  They can also be found in plant foods such as flaxseed, walnuts, and soybeans      How unhealthy fats affect your cholesterol levels: Unhealthy fats increase LDL cholesterol and triglyceride levels  They are found in foods high in cholesterol, saturated fat, and trans fat:  Cholesterol  is found in eggs, dairy, and meat  Saturated fat  is found in butter, cheese, ice cream, whole milk, and coconut oil  Saturated fat is also found in meat, such as sausage, hot dogs, and bologna  Trans fat  is found in liquid oils and is used in fried and baked foods  Foods that contain trans fats include chips, crackers, muffins, sweet rolls, microwave popcorn, and cookies  Treatment  for high cholesterol will also decrease your risk of heart disease, heart attack, and stroke  Treatment may include any of the following:  Lifestyle changes  may include food, exercise, weight loss, and quitting smoking  You may also need to decrease the amount of alcohol you drink  Your healthcare provider will want you to start with lifestyle changes  Other treatment may be added if lifestyle changes are not enough  Your healthcare provider may recommend you work with a team to manage hyperlipidemia  The team may include medical experts such as a dietitian, an exercise or physical therapist, and a behavior therapist  Your family members may be included in helping you create lifestyle changes  Medicines  may be given to lower your LDL cholesterol, triglyceride levels, or total cholesterol level  You may need medicines to lower your cholesterol if any of the following is true:    You have a history of stroke, TIA, unstable angina, or a heart attack  Your LDL cholesterol level is 190 mg/dL or higher  You are age 36 to 76 years, have diabetes or heart disease risk factors, and your LDL cholesterol is 70 mg/dL or higher  Supplements  include fish oil, red yeast rice, and garlic  Fish oil may help lower your triglyceride and LDL cholesterol levels  It may also increase your HDL cholesterol level   Red yeast rice may help decrease your total cholesterol level and LDL cholesterol level  Garlic may help lower your total cholesterol level  Do not take any supplements without talking to your healthcare provider  Food changes you can make to lower your cholesterol levels:  A dietitian can help you create a healthy eating plan  He or she can show you how to read food labels and choose foods low in saturated fat, trans fats, and cholesterol  Decrease the total amount of fat you eat  Choose lean meats, fat-free or 1% fat milk, and low-fat dairy products, such as yogurt and cheese  Try to limit or avoid red meats  Limit or do not eat fried foods or baked goods, such as cookies  Replace unhealthy fats with healthy fats  Cook foods in olive oil or canola oil  Choose soft margarines that are low in saturated fat and trans fat  Seeds, nuts, and avocados are other examples of healthy fats  Eat foods with omega-3 fats  Examples include salmon, tuna, mackerel, walnuts, and flaxseed  Eat fish 2 times per week  Pregnant women should not eat fish that have high levels of mercury, such as shark, swordfish, and rodrigo mackerel  Increase the amount of high-fiber foods you eat  High-fiber foods can help lower your LDL cholesterol  Aim to get between 20 and 30 grams of fiber each day  Fruits and vegetables are high in fiber  Eat at least 5 servings each day  Other high-fiber foods are whole-grain or whole-wheat breads, pastas, or cereals, and brown rice  Eat 3 ounces of whole-grain foods each day  Increase fiber slowly  You may have abdominal discomfort, bloating, and gas if you add fiber to your diet too quickly  Eat healthy protein foods  Examples include low-fat dairy products, skinless chicken and turkey, fish, and nuts  Limit foods and drinks that are high in sugar  Your dietitian or healthcare provider can help you create daily limits for high-sugar foods and drinks  The limit may be lower if you have diabetes or another health condition   Limits can also help you lose weight if needed  Lifestyle changes you can make to lower your cholesterol levels:   Maintain a healthy weight  Ask your healthcare provider what a healthy weight is for you  Ask him or her to help you create a weight loss plan if needed  Weight loss can decrease your total cholesterol and triglyceride levels  Weight loss may also help keep your blood pressure at a healthy level  Be physically active throughout the day  Physical activity, such as exercise, can help lower your total cholesterol level and maintain a healthy weight  Physical activity can also help increase your HDL cholesterol level  Work with your healthcare provider to create an program that is right for you  Get at least 30 to 40 minutes of moderate physical activity most days of the week  Examples of exercise include brisk walking, swimming, or biking  Also include strength training at least 2 times each week  Your healthcare providers can help you create a physical activity plan  Do not smoke  Nicotine and other chemicals in cigarettes and cigars can raise your cholesterol levels  Ask your healthcare provider for information if you currently smoke and need help to quit  E-cigarettes or smokeless tobacco still contain nicotine  Talk to your healthcare provider before you use these products  Limit or do not drink alcohol  Alcohol can increase your triglyceride levels  Ask your healthcare provider before you drink alcohol  Ask how much is okay for you to drink in 24 hours or 1 week  Follow up with your doctor as directed:  Write down your questions so you remember to ask them during your visits  © Copyright [employer] 2022 Information is for End User's use only and may not be sold, redistributed or otherwise used for commercial purposes  The above information is an  only  It is not intended as medical advice for individual conditions or treatments   Talk to your doctor, nurse or pharmacist before following any medical regimen to see if it is safe and effective for you

## 2023-05-22 ENCOUNTER — ANESTHESIA EVENT (OUTPATIENT)
Dept: PERIOP | Facility: HOSPITAL | Age: 48
End: 2023-05-22

## 2023-05-22 NOTE — PRE-PROCEDURE INSTRUCTIONS
Pre-Surgery Instructions:   Medication Instructions   • ascorbic acid (VITAMIN C) 500 mg tablet Stop 5/22  Do not take morning of surgery   • aspirin 81 MG tablet Instructions provided by MD Advised to call prescribing physician for instructions   • atorvastatin (LIPITOR) 10 mg tablet Take night before surgery   • Calcium Carbonate Antacid 648 MG TABS Stop 5/22  Do not take morning of surgery   • cetirizine (ZyrTEC) 10 mg tablet Uses PRN- OK to take day of surgery   • Cholecalciferol 25 MCG (1000 UT) capsule Stop 5/22  Do not take morning of surgery   • Coenzyme Q10 300 MG CAPS Stop 5/22  Do not take morning of surgery   • cyclobenzaprine (FLEXERIL) 10 mg tablet Uses PRN- OK to take day of surgery   • dicyclomine (BENTYL) 20 mg tablet Uses PRN- OK to take day of surgery   • ferrous sulfate 325 (65 Fe) mg tablet Stop 5/22  Do not take morning of surgery   • Magnesium Gluconate 550 MG TABS Stop 5/22  Do not take morning of surgery   • meloxicam (MOBIC) 15 mg tablet Stop 5/22  Do not take morning of surgery   • Multiple Vitamin (TAB-A-CORWIN) TABS Stop 5/22  Do not take morning of surgery   • Omega-3 Fatty Acids (FISH OIL) 1,000 mg Stop 5/22  Do not take morning of surgery   Medication instructions for day surgery reviewed  Please use only a sip of water to take your instructed medications  Avoid all over the counter vitamins, supplements and NSAIDS for one week prior to surgery per anesthesia guidelines  Tylenol is ok to take as needed  You will receive a call one business day prior to surgery with an arrival time and hospital directions  If your surgery is scheduled on a Monday, the hospital will be calling you on the Friday prior to your surgery  If you have not heard from anyone by 8pm, please call the hospital supervisor through the hospital  at [phone]  Adi Evans [phone])      Do not eat or drink anything after midnight the night before your surgery, including candy, mints, lifesavers, or chewing gum  Do not drink alcohol 24hrs before your surgery  Try not to smoke at least 24hrs before your surgery  Follow the pre surgery showering instructions as listed in the [facility] Surgical Experience Booklet” or otherwise provided by your surgeon's office  Do not shave the surgical area 24 hours before surgery  Do not apply any lotions, creams, including makeup, cologne, deodorant, or perfumes after showering on the day of your surgery  No contact lenses, eye make-up, or artificial eyelashes  Remove nail polish, including gel polish, and any artificial, gel, or acrylic nails if possible  Remove all jewelry including rings and body piercing jewelry  Wear causal clothing that is easy to take on and off  Consider your type of surgery  Keep any valuables, jewelry, piercings at home  Please bring any specially ordered equipment (sling, braces) if indicated  Arrange for a responsible person to drive you to and from the hospital on the day of your surgery  Visitor Guidelines discussed  Call the surgeon's office with any new illnesses, exposures, or additional questions prior to surgery  Please reference your [facility] Surgical Experience Booklet” for additional information to prepare for your upcoming surgery

## 2023-05-24 RX ORDER — SODIUM CHLORIDE 9 MG/ML
125 INJECTION, SOLUTION INTRAVENOUS CONTINUOUS
Status: CANCELLED | OUTPATIENT
Start: 2023-05-25

## 2023-05-24 NOTE — H&P
H&P Exam - Gynecology   [facility] 50 y o  female MRN: [id number]  Unit/Bed#:  Encounter: [id number]    Assessment/Plan    [id number] with menorrhagia and irregular menses who presents for [facility]  hysteroscopy, Mile ablation  Workup completed and unremarkable  Pt with history of patent foramen ovale and CVA  Was offered progesterone options and declined  Ablation reviewed, consents reviewed and signed  All questions answered  Post op care and expectations reviewed  Assessment:    Plan:      History of Present Illness   HX and PE limited by: ranjeet  HPI:  [facility] is a 50 y o  female who presents with menometrorrhagia  She has a history of patent foramen ovale and CVA, was offered progesterone options and declines these  EMB, TSH and pelvic US were completed and negative  Review of Systems   Pt denies SOB, dizziness, palpitations  Historical Information   Past Medical History:   Diagnosis Date   • Allergic     Seasonal   • Anemia    • Anxiety    • APS (antiphospholipid syndrome) (HCC)    • AR (allergic rhinitis)    • H/O TIA (transient ischemic attack) and stroke    • Heart murmur    • Internal and external hemorrhoids without complication    • Irritable bowel syndrome     With Constipation   • Other constipation    • Other hyperlipidemia 2021   • Paresthesias in left hand     Resolved 2017    • Patent foramen ovale     2018    • PONV (postoperative nausea and vomiting)    • Single delivery by     • Stroke [facility])    • TIA (transient ischemic attack) 2017   • Visual impairment     Wears glasses     Past Surgical History:   Procedure Laterality Date   •  SECTION       x 1   • PATENT FORAMEN OVALE CLOSURE  2017    [facility] Cardio Dr aGrcia Heading   • WISDOM TOOTH EXTRACTION       OB/GYN History:  @ term; primary c/s for breech at term; successful  at term  Pap smears have been normal, vasectomy for birth control  Recent mammogram negative    Family History   Problem Relation Age of Onset   • Osteoarthritis Mother    • Osteoporosis Mother    • Arthritis Mother         Spine   • Prostate cancer Father    • Hyperlipidemia Father    • Hypertension Father    • Osteoarthritis Father    • Basal cell carcinoma Father 72   • Arthritis Father         THR and TKR   • Obesity Sister    • No Known Problems Brother    • No Known Problems Daughter    • No Known Problems Daughter    • No Known Problems Daughter    • Breast cancer Maternal Aunt    • Heart attack Maternal Uncle    • Cancer Maternal Uncle         Bladder Cancer   • Arthritis Paternal Grandmother    • Heart attack Maternal Grandmother 40   • Stroke Maternal Grandmother    • Breast cancer Maternal Grandmother    • Thrombosis Maternal Grandmother    • Obesity Family      Social History   Social History     Substance and Sexual Activity   Alcohol Use Yes   • Alcohol/week: 2 0 standard drinks of alcohol   • Types: 1 Glasses of wine, 1 Cans of beer per week     Social History     Substance and Sexual Activity   Drug Use Never     Social History     Tobacco Use   Smoking Status Former   • Packs/day: 0 25   • Years: 3 00   • Total pack years: 0 75   • Types: Cigarettes   • Quit date: 1995   • Years since quittin 4   Smokeless Tobacco Never     E-Cigarette/Vaping   • E-Cigarette Use Former User      E-Cigarette/Vaping Substances   • Nicotine No    • THC No    • CBD No    • Flavoring No    • Other No    • Unknown No        Meds/Allergies   all current active meds have been reviewed  Allergies   Allergen Reactions   • Latex Rash       Objective   Vitals: Last menstrual period 2023, not currently breastfeeding  No intake or output data in the 24 hours ending 23 1240    Invasive Devices:    Invasive Devices     None                 Physical Exam   Well appearing female in no apparent distress, pleasant  Neck supple, no adenopathy  Lungs clear b/l  Cardiac regular rate/rhythm  Abdomen soft, non tender no guarding or rebound  Pelvic  Normal vulva, introitus, urethra, vagina  Cervix multiparous without lesion  Uterus anteverted, normal size, non tender  No adnexal enlargement  Lab Results: I have personally reviewed pertinent reports  Imaging: I have personally reviewed pertinent reports  EKG, Pathology, and Other Studies: I have personally reviewed pertinent reports  Code Status: No Order  Advance Directive and Living Will:      Power of :    POLST:      Counseling / Coordination of Care  Total floor / unit time spent today  minutes  Greater than 50% of total time was spent with the patient and / or family counseling and / or coordination of care  A description of the counseling / coordination of care: Cornelio Wright

## 2023-05-25 ENCOUNTER — HOSPITAL ENCOUNTER (OUTPATIENT)
Facility: HOSPITAL | Age: 48
Setting detail: OUTPATIENT SURGERY
Discharge: HOME | End: 2023-05-25
Attending: OBSTETRICS & GYNECOLOGY | Admitting: OBSTETRICS & GYNECOLOGY

## 2023-05-25 ENCOUNTER — ANESTHESIA (OUTPATIENT)
Dept: PERIOP | Facility: HOSPITAL | Age: 48
End: 2023-05-25

## 2023-05-25 VITALS
HEIGHT: 65 IN | OXYGEN SATURATION: 99 % | DIASTOLIC BLOOD PRESSURE: 72 MMHG | RESPIRATION RATE: 15 BRPM | HEART RATE: 75 BPM | BODY MASS INDEX: 29.38 KG/M2 | TEMPERATURE: 97 F | WEIGHT: 176.37 LBS | SYSTOLIC BLOOD PRESSURE: 125 MMHG

## 2023-05-25 DIAGNOSIS — N92.0 EXCESSIVE AND FREQUENT MENSTRUATION WITH REGULAR CYCLE: ICD-10-CM

## 2023-05-25 PROBLEM — N92.1 MENORRHAGIA WITH IRREGULAR CYCLE: Status: ACTIVE | Noted: 2023-05-25

## 2023-05-25 LAB
EXT PREGNANCY TEST URINE: NEGATIVE
EXT. CONTROL: NORMAL

## 2023-05-25 RX ORDER — ONDANSETRON 2 MG/ML
4 INJECTION INTRAMUSCULAR; INTRAVENOUS EVERY 6 HOURS PRN
Status: DISCONTINUED | OUTPATIENT
Start: 2023-05-25 | End: 2023-05-25 | Stop reason: HOSPADM

## 2023-05-25 RX ORDER — SCOLOPAMINE TRANSDERMAL SYSTEM 1 MG/1
1 PATCH, EXTENDED RELEASE TRANSDERMAL ONCE
Status: DISCONTINUED | OUTPATIENT
Start: 2023-05-25 | End: 2023-05-25 | Stop reason: HOSPADM

## 2023-05-25 RX ORDER — ONDANSETRON 2 MG/ML
INJECTION INTRAMUSCULAR; INTRAVENOUS AS NEEDED
Status: DISCONTINUED | OUTPATIENT
Start: 2023-05-25 | End: 2023-05-25

## 2023-05-25 RX ORDER — LIDOCAINE HYDROCHLORIDE 20 MG/ML
INJECTION, SOLUTION EPIDURAL; INFILTRATION; INTRACAUDAL; PERINEURAL AS NEEDED
Status: DISCONTINUED | OUTPATIENT
Start: 2023-05-25 | End: 2023-05-25

## 2023-05-25 RX ORDER — FENTANYL CITRATE 50 UG/ML
INJECTION, SOLUTION INTRAMUSCULAR; INTRAVENOUS AS NEEDED
Status: DISCONTINUED | OUTPATIENT
Start: 2023-05-25 | End: 2023-05-25

## 2023-05-25 RX ORDER — IBUPROFEN 600 MG/1
600 TABLET ORAL EVERY 6 HOURS PRN
Status: DISCONTINUED | OUTPATIENT
Start: 2023-05-25 | End: 2023-05-25 | Stop reason: HOSPADM

## 2023-05-25 RX ORDER — PROPOFOL 10 MG/ML
INJECTION, EMULSION INTRAVENOUS CONTINUOUS PRN
Status: DISCONTINUED | OUTPATIENT
Start: 2023-05-25 | End: 2023-05-25

## 2023-05-25 RX ORDER — ACETAMINOPHEN 325 MG/1
975 TABLET ORAL ONCE
Status: COMPLETED | OUTPATIENT
Start: 2023-05-25 | End: 2023-05-25

## 2023-05-25 RX ORDER — ACETAMINOPHEN 325 MG/1
975 TABLET ORAL EVERY 6 HOURS PRN
Status: DISCONTINUED | OUTPATIENT
Start: 2023-05-25 | End: 2023-05-25 | Stop reason: HOSPADM

## 2023-05-25 RX ORDER — MIDAZOLAM HYDROCHLORIDE 2 MG/2ML
INJECTION, SOLUTION INTRAMUSCULAR; INTRAVENOUS AS NEEDED
Status: DISCONTINUED | OUTPATIENT
Start: 2023-05-25 | End: 2023-05-25

## 2023-05-25 RX ORDER — MAGNESIUM HYDROXIDE 1200 MG/15ML
LIQUID ORAL AS NEEDED
Status: DISCONTINUED | OUTPATIENT
Start: 2023-05-25 | End: 2023-05-25 | Stop reason: HOSPADM

## 2023-05-25 RX ORDER — FENTANYL CITRATE 50 UG/ML
50 INJECTION, SOLUTION INTRAMUSCULAR; INTRAVENOUS
Status: DISCONTINUED | OUTPATIENT
Start: 2023-05-25 | End: 2023-05-25 | Stop reason: HOSPADM

## 2023-05-25 RX ORDER — SODIUM CHLORIDE, SODIUM LACTATE, POTASSIUM CHLORIDE, CALCIUM CHLORIDE 600; 310; 30; 20 MG/100ML; MG/100ML; MG/100ML; MG/100ML
125 INJECTION, SOLUTION INTRAVENOUS CONTINUOUS
Status: DISCONTINUED | OUTPATIENT
Start: 2023-05-25 | End: 2023-05-25 | Stop reason: HOSPADM

## 2023-05-25 RX ORDER — DEXAMETHASONE SODIUM PHOSPHATE 10 MG/ML
INJECTION, SOLUTION INTRAMUSCULAR; INTRAVENOUS AS NEEDED
Status: DISCONTINUED | OUTPATIENT
Start: 2023-05-25 | End: 2023-05-25

## 2023-05-25 RX ORDER — PROPOFOL 10 MG/ML
INJECTION, EMULSION INTRAVENOUS AS NEEDED
Status: DISCONTINUED | OUTPATIENT
Start: 2023-05-25 | End: 2023-05-25

## 2023-05-25 RX ORDER — KETOROLAC TROMETHAMINE 30 MG/ML
INJECTION, SOLUTION INTRAMUSCULAR; INTRAVENOUS AS NEEDED
Status: DISCONTINUED | OUTPATIENT
Start: 2023-05-25 | End: 2023-05-25

## 2023-05-25 RX ADMIN — SODIUM CHLORIDE, SODIUM LACTATE, POTASSIUM CHLORIDE, AND CALCIUM CHLORIDE 125 ML/HR: .6; .31; .03; .02 INJECTION, SOLUTION INTRAVENOUS at 13:13

## 2023-05-25 RX ADMIN — ACETAMINOPHEN 325MG 975 MG: 325 TABLET ORAL at 12:52

## 2023-05-25 RX ADMIN — KETOROLAC TROMETHAMINE 30 MG: 30 INJECTION, SOLUTION INTRAMUSCULAR at 15:01

## 2023-05-25 RX ADMIN — ONDANSETRON 4 MG: 2 INJECTION INTRAMUSCULAR; INTRAVENOUS at 14:40

## 2023-05-25 RX ADMIN — LIDOCAINE HYDROCHLORIDE 100 MG: 20 INJECTION, SOLUTION EPIDURAL; INFILTRATION; INTRACAUDAL at 14:36

## 2023-05-25 RX ADMIN — DEXAMETHASONE SODIUM PHOSPHATE 10 MG: 10 INJECTION INTRAMUSCULAR; INTRAVENOUS at 14:40

## 2023-05-25 RX ADMIN — PROPOFOL 200 MG: 10 INJECTION, EMULSION INTRAVENOUS at 14:36

## 2023-05-25 RX ADMIN — PROPOFOL 70 MCG/KG/MIN: 10 INJECTION, EMULSION INTRAVENOUS at 14:36

## 2023-05-25 RX ADMIN — SCOPALAMINE 1 PATCH: 1 PATCH, EXTENDED RELEASE TRANSDERMAL at 13:13

## 2023-05-25 RX ADMIN — MIDAZOLAM 2 MG: 1 INJECTION INTRAMUSCULAR; INTRAVENOUS at 14:32

## 2023-05-25 RX ADMIN — FENTANYL CITRATE 50 MCG: 50 INJECTION INTRAMUSCULAR; INTRAVENOUS at 14:40

## 2023-05-25 NOTE — OP NOTE
OPERATIVE REPORT  PATIENT NAME: Nina Jack    :  1975  MRN: [id number]  Pt Location: [facility] OR ROOM 03    SURGERY DATE: 2023    Surgeon(s) and Role:     * Ignacio Pan MD - Primary     * Kayleigh Mojica MD - Assisting    Preop Diagnosis:  Excessive and frequent menstruation with regular cycle [N92 0]  Menorrhagia with irregular cycle    Post-Op Diagnosis Codes:     * Excessive and frequent menstruation with regular cycle [N92 0]   Menorrhagia with irregular cycle  S/p endometrial ablation    Procedure(s):  (D&C) W/ DIAGNOSTIC HYSTEROSCOPY  ABLATION ENDOMETRIAL MILE    Specimen(s):  ID Type Source Tests Collected by Time Destination   1 : [facility] [place]AM Merle Hansen MD 2023 1510        Estimated Blood Loss:   5cc    Drains:  * No LDAs found *    Anesthesia Type:   Choice    Operative Indications:  Excessive and frequent menstruation with regular cycle [N92 0]  Menorrhagia with irregular cycle    Operative Findings:  Normal appearing external female genitalia  Multiparous cervix with grade 1 uterine descensus  Anteverted uterus, non-enlarged  Hysteroscopy with proliferative appearing endometrium  No apparent polyps, lesions, or masses  Endometrial ablation completed with Mile device  Appropriate charred endometrial tissue appreciated on hysteroscopy  Fluid deficit 579QC    Complications:   None apparent    Procedure and Technique:  Patient was taken to the operating room  General LMA anesthesia (LMA) was administered and the patient was positioned on the OR table in the dorsal lithotomy position  All pressure points were padded and a angelito hugger was placed to maintain control of core body temperature  A bimanual exam was performed and the uterus was noted to be anteverted, normal in size and consistency with no palpable adnexal masses or fullness  The patient was prepped and draped in the usual sterile fashion      A time out was performed to confirm correct patient and correct procedure  A weighted speculum was inserted into the vagina and a Hans retractor was used to visualize the anterior lip of the cervix, which was then grasped with a single toothed tenaculum  The uterus was sounded to 9cm  The cervix was serially dilated using Robert dilators for introduction of the hysteroscope  Hysteroscope was introduced under direct visualization using normal saline solution as the distention media  Hysteroscope was advanced to the uterine fundus and the entire uterine cavity was inspected in a systematic manner  There was noted to be proliferative appearing endometrial tissue without apparent polyps, lesions, or masses  Bilateral ostia visualized  Hysteroscope was withdrawn and sharp curetting was performed, starting at the 12'oclock position and rotating a total of 360 degrees to cover all surfaces  Endometrial tissue was obtained and sent for pathology  The Mile ablation device was then selected  Cervical length was measured to 3cm  Uterine cavity length was set to 6cm on the Mile device  The Mile device was inserted to the uterine fundus, passed cavity check, and ablation process occurred for 120 seconds without complication  The hysteroscope was then re-introduced under direct visualization, again using normal saline solution as the distention media  Entire uterine cavity was inspected in a systematic manner  Adequate charred endometrial tissue was confirmed and hysteroscope was withdrawn  The single toothed tenaculum was removed from the anterior lip of the cervix  Good hemostasis was confirmed at the tenaculum sites with application of pressure with ringed forceps  All instrumentation was then removed from the vagina  Fluid deficit was noted to be 100cc  At the conclusion of the procedure, all needle, sponge, and instrument counts were noted to be correct x2  Dr Padma Anderson was present and participated in all key portions of the case               Patient Disposition:  PACU         SIGNATURE: Wendy Rousseau MD  DATE: May 25, 2023  TIME: 3:18 PM

## 2023-05-25 NOTE — ANESTHESIA PREPROCEDURE EVALUATION
Procedure:  (D&C) W/ DIAGNOSTIC HYSTEROSCOPY (Uterus)  ABLATION ENDOMETRIAL KEYSHAWN (Uterus)    Relevant Problems   ANESTHESIA   (+) PONV (postoperative nausea and vomiting)      CARDIO   (+) Other hyperlipidemia      MUSCULOSKELETAL   (+) Fibromyalgia      NEURO/PSYCH   (+) Anxiety   (+) Fibromyalgia      Other   (+) H/O TIA (transient ischemic attack) and stroke        Physical Exam    Airway    Mallampati score: II  TM Distance: >3 FB  Neck ROM: full     Dental   No notable dental hx     Cardiovascular  Rhythm: regular, Rate: normal, Cardiovascular exam normal    Pulmonary  Pulmonary exam normal Breath sounds clear to auscultation,     Other Findings        Anesthesia Plan  ASA Score- 2     Anesthesia Type- general with ASA Monitors  Additional Monitors:   Airway Plan: LMA  Comment: SCOP patch ordered  Plan Factors-    Chart reviewed  Existing labs reviewed  Patient summary reviewed  Patient is not a current smoker  Patient not instructed to abstain from smoking on day of procedure  Patient did not smoke on day of surgery  Induction- intravenous  Postoperative Plan-     Informed Consent- Anesthetic plan and risks discussed with patient and spouse

## 2023-05-25 NOTE — ANESTHESIA POSTPROCEDURE EVALUATION
"Post-Op Assessment Note    CV Status:  Stable  Pain Score: 2    Pain management: adequate     Mental Status:  Alert and awake   Hydration Status:  Euvolemic   PONV Controlled:  Controlled   Airway Patency:  Patent      Post Op Vitals Reviewed: Yes      Staff: Anesthesiologist         No notable events documented      BP      Temp     Pulse     Resp      SpO2      /78   Pulse 64   Temp (!) 97 3 °F (36 3 °C)   Resp 16   Ht 5' 5\" (1 651 m)   Wt 80 kg (176 lb 5 9 oz)   LMP 05/16/2023 (Exact Date)   SpO2 98%   BMI 29 35 kg/m²     "

## 2023-06-07 RX ORDER — SODIUM CHLORIDE 9 MG/ML
125 INJECTION, SOLUTION INTRAVENOUS CONTINUOUS
Status: CANCELLED | OUTPATIENT
Start: 2023-06-07

## 2023-06-08 ENCOUNTER — TELEPHONE (OUTPATIENT)
Dept: GASTROENTEROLOGY | Facility: MEDICAL CENTER | Age: 48
End: 2023-06-08

## 2023-06-08 NOTE — TELEPHONE ENCOUNTER
While at [facility] lab  Spoke to patient because she requested to have prep instructions emailed to her again  She received her prep instructions  She stated she started her bowel prep at 2 pm with Golytely  She was advised to also taker her Dulcolax

## 2023-06-09 ENCOUNTER — ANESTHESIA (OUTPATIENT)
Dept: GASTROENTEROLOGY | Facility: MEDICAL CENTER | Age: 48
End: 2023-06-09

## 2023-06-09 ENCOUNTER — ANESTHESIA EVENT (OUTPATIENT)
Dept: GASTROENTEROLOGY | Facility: MEDICAL CENTER | Age: 48
End: 2023-06-09

## 2023-06-09 ENCOUNTER — HOSPITAL ENCOUNTER (OUTPATIENT)
Dept: GASTROENTEROLOGY | Facility: MEDICAL CENTER | Age: 48
Setting detail: OUTPATIENT SURGERY
End: 2023-06-09
Payer: COMMERCIAL

## 2023-06-09 VITALS
OXYGEN SATURATION: 99 % | DIASTOLIC BLOOD PRESSURE: 69 MMHG | BODY MASS INDEX: 28.79 KG/M2 | WEIGHT: 173 LBS | RESPIRATION RATE: 18 BRPM | TEMPERATURE: 98.8 F | SYSTOLIC BLOOD PRESSURE: 115 MMHG | HEART RATE: 86 BPM

## 2023-06-09 DIAGNOSIS — Z12.11 SCREENING FOR COLON CANCER: ICD-10-CM

## 2023-06-09 PROBLEM — Z86.0100 HISTORY OF COLON POLYPS: Status: ACTIVE | Noted: 2023-06-09

## 2023-06-09 PROBLEM — Z86.010 HISTORY OF COLON POLYPS: Status: ACTIVE | Noted: 2023-06-09

## 2023-06-09 LAB
EXT PREGNANCY TEST URINE: NEGATIVE
EXT. CONTROL: NORMAL

## 2023-06-09 PROCEDURE — 45385 COLONOSCOPY W/LESION REMOVAL: CPT | Performed by: INTERNAL MEDICINE

## 2023-06-09 PROCEDURE — 81025 URINE PREGNANCY TEST: CPT | Performed by: ANESTHESIOLOGY

## 2023-06-09 PROCEDURE — 88305 TISSUE EXAM BY PATHOLOGIST: CPT | Performed by: PATHOLOGY

## 2023-06-09 RX ORDER — SODIUM CHLORIDE 9 MG/ML
125 INJECTION, SOLUTION INTRAVENOUS CONTINUOUS
Status: DISCONTINUED | OUTPATIENT
Start: 2023-06-09 | End: 2023-06-13 | Stop reason: HOSPADM

## 2023-06-09 RX ORDER — LIDOCAINE HYDROCHLORIDE 20 MG/ML
INJECTION, SOLUTION EPIDURAL; INFILTRATION; INTRACAUDAL; PERINEURAL AS NEEDED
Status: DISCONTINUED | OUTPATIENT
Start: 2023-06-09 | End: 2023-06-09

## 2023-06-09 RX ORDER — PROPOFOL 10 MG/ML
INJECTION, EMULSION INTRAVENOUS AS NEEDED
Status: DISCONTINUED | OUTPATIENT
Start: 2023-06-09 | End: 2023-06-09

## 2023-06-09 RX ADMIN — PROPOFOL 50 MG: 10 INJECTION, EMULSION INTRAVENOUS at 09:03

## 2023-06-09 RX ADMIN — PROPOFOL 50 MG: 10 INJECTION, EMULSION INTRAVENOUS at 09:17

## 2023-06-09 RX ADMIN — PROPOFOL 50 MG: 10 INJECTION, EMULSION INTRAVENOUS at 09:00

## 2023-06-09 RX ADMIN — PROPOFOL 50 MG: 10 INJECTION, EMULSION INTRAVENOUS at 08:53

## 2023-06-09 RX ADMIN — PROPOFOL 50 MG: 10 INJECTION, EMULSION INTRAVENOUS at 09:08

## 2023-06-09 RX ADMIN — PROPOFOL 50 MG: 10 INJECTION, EMULSION INTRAVENOUS at 09:12

## 2023-06-09 RX ADMIN — PROPOFOL 50 MG: 10 INJECTION, EMULSION INTRAVENOUS at 09:20

## 2023-06-09 RX ADMIN — Medication 40 MG: at 09:18

## 2023-06-09 RX ADMIN — SODIUM CHLORIDE 125 ML/HR: 0.9 INJECTION, SOLUTION INTRAVENOUS at 08:09

## 2023-06-09 RX ADMIN — LIDOCAINE HYDROCHLORIDE 50 MG: 20 INJECTION, SOLUTION EPIDURAL; INFILTRATION; INTRACAUDAL; PERINEURAL at 08:48

## 2023-06-09 RX ADMIN — PROPOFOL 50 MG: 10 INJECTION, EMULSION INTRAVENOUS at 09:05

## 2023-06-09 RX ADMIN — PROPOFOL 100 MG: 10 INJECTION, EMULSION INTRAVENOUS at 08:51

## 2023-06-09 RX ADMIN — PROPOFOL 50 MG: 10 INJECTION, EMULSION INTRAVENOUS at 08:57

## 2023-06-09 RX ADMIN — PROPOFOL 50 MG: 10 INJECTION, EMULSION INTRAVENOUS at 09:23

## 2023-06-09 RX ADMIN — PROPOFOL 50 MG: 10 INJECTION, EMULSION INTRAVENOUS at 09:25

## 2023-06-09 NOTE — ANESTHESIA PREPROCEDURE EVALUATION
Procedure:  COLONOSCOPY    Relevant Problems   ANESTHESIA   (+) PONV (postoperative nausea and vomiting)      CARDIO   (+) Other hyperlipidemia      MUSCULOSKELETAL   (+) Fibromyalgia      NEURO/PSYCH   (+) Anxiety   (+) Fibromyalgia      Hematopoietic and Hemostatic   (+) APS (antiphospholipid syndrome) (HCC)      Other   (+) H/O TIA (transient ischemic attack) and stroke        Physical Exam    Airway    Mallampati score: II  TM Distance: >3 FB  Neck ROM: full     Dental       Cardiovascular  Cardiovascular exam normal    Pulmonary  Pulmonary exam normal     Other Findings        Anesthesia Plan  ASA Score- 2     Anesthesia Type- IV sedation with anesthesia with ASA Monitors  Additional Monitors:   Airway Plan:           Plan Factors-    Chart reviewed  Patient summary reviewed  Induction- intravenous  Postoperative Plan-     Informed Consent- Anesthetic plan and risks discussed with patient

## 2023-06-09 NOTE — H&P
H&P EXAM - Outpatient Endoscopy   Sadiq Aguilar 46 y o  female MRN: [id number]    [facility] ENDOSCOPY   Encounter: [id number]      History and Physical - [facility]  Sadiq Aguilar 46 y o  female MRN: [id number]                  HPI: Sadiq Aguilar is a 46y o  year old female who presents for colon cancer screening      REVIEW OF SYSTEMS: Per the HPI, and otherwise unremarkable  Historical Information   Past Medical History:   Diagnosis Date   • Allergic     Seasonal   • Anemia    • Anxiety    • APS (antiphospholipid syndrome) (HCC)    • AR (allergic rhinitis)    • H/O TIA (transient ischemic attack) and stroke    • Heart murmur    • Internal and external hemorrhoids without complication    • Irritable bowel syndrome     With Constipation   • Other constipation    • Other hyperlipidemia 2021   • Paresthesias in left hand     Resolved 2017    • Patent foramen ovale     2018    • PONV (postoperative nausea and vomiting)    • Single delivery by     • Stroke [facility])    • TIA (transient ischemic attack) 2017   • Visual impairment     Wears glasses     Past Surgical History:   Procedure Laterality Date   •  SECTION       x 1   • DILATION AND CURETTAGE OF UTERUS WITH HYSTEROSOCPY N/A 2023    Procedure: (D&C) W/ DIAGNOSTIC HYSTEROSCOPY;  Surgeon: Mikey Myers MD;  Location: [facility];  Service: Gynecology   • PATENT FORAMEN OVALE CLOSURE  2017    [facility] Cardio Dr Romaine Ballesteros   • GA HYSTEROSCOPY ENDOMETRIAL ABLATION N/A 2023    Procedure: ABLATION ENDOMETRIAL KEYSHAWN;  Surgeon:  Mikey Myers MD;  Location: [facility];  Service: Gynecology   • WISDOM TOOTH EXTRACTION       Social History   Social History     Substance and Sexual Activity   Alcohol Use Yes   • Alcohol/week: 2 0 standard drinks of alcohol   • Types: 1 Glasses of wine, 1 Cans of beer per week     Social History     Substance and Sexual Activity   Drug Use Never Social History     Tobacco Use   Smoking Status Former   • Packs/day: 0 25   • Years: 3 00   • Total pack years: 0 75   • Types: Cigarettes   • Quit date: 1995   • Years since quittin 4   Smokeless Tobacco Never     Family History   Problem Relation Age of Onset   • Osteoarthritis Mother    • Osteoporosis Mother    • Arthritis Mother         Spine   • Prostate cancer Father    • Hyperlipidemia Father    • Hypertension Father    • Osteoarthritis Father    • Basal cell carcinoma Father 72   • Arthritis Father         THR and TKR   • Obesity Sister    • No Known Problems Brother    • No Known Problems Daughter    • No Known Problems Daughter    • No Known Problems Daughter    • Breast cancer Maternal Aunt    • Heart attack Maternal Uncle    • Cancer Maternal Uncle         Bladder Cancer   • Arthritis Paternal Grandmother    • Heart attack Maternal Grandmother 36   • Stroke Maternal Grandmother    • Breast cancer Maternal Grandmother    • Thrombosis Maternal Grandmother    • Obesity Family        Meds/Allergies     (Not in a hospital admission)      Allergies   Allergen Reactions   • Latex Rash       Objective     /64   Pulse 89   Temp 98 8 °F (37 1 °C) (Temporal)   Resp 16   Wt 78 5 kg (173 lb)   LMP 2023 (Exact Date) Comment: uterine ablation   SpO2 98%   BMI 28 79 kg/m²       PHYSICAL EXAM    Gen: NAD  CV: RRR  CHEST: Clear  ABD: soft, NT/ND  EXT: no edema      ASSESSMENT/PLAN:  This is a 46y o  year old female here for colonoscopy, and she is stable and optimized for her procedure

## 2023-06-09 NOTE — ANESTHESIA POSTPROCEDURE EVALUATION
Post-Op Assessment Note    CV Status:  Stable    Pain management: adequate     Mental Status:  Alert and awake   Hydration Status:  Euvolemic   PONV Controlled:  Controlled   Airway Patency:  Patent      Post Op Vitals Reviewed: Yes      Staff: Anesthesiologist         No notable events documented      /69 (06/09/23 0943)    Temp      Pulse 86 (06/09/23 0943)   Resp 18 (06/09/23 0943)    SpO2 99 % (06/09/23 0943)

## 2023-11-06 ENCOUNTER — RA CDI HCC (OUTPATIENT)
Dept: OTHER | Facility: HOSPITAL | Age: 48
End: 2023-11-06

## 2023-11-06 NOTE — PROGRESS NOTES
[address] [facility] coding opportunities       Chart reviewed, no opportunity found: CHART REVIEWED, NO OPPORTUNITY FOUND        Patients Insurance        Commercial Insurance: [employer]

## 2023-11-08 ENCOUNTER — LAB (OUTPATIENT)
Dept: LAB | Facility: CLINIC | Age: 48
End: 2023-11-08
Payer: COMMERCIAL

## 2023-11-08 DIAGNOSIS — E78.49 OTHER HYPERLIPIDEMIA: ICD-10-CM

## 2023-11-08 DIAGNOSIS — E66.3 OVERWEIGHT: ICD-10-CM

## 2023-11-08 DIAGNOSIS — F41.9 ANXIETY: ICD-10-CM

## 2023-11-08 DIAGNOSIS — Z86.73 H/O TIA (TRANSIENT ISCHEMIC ATTACK) AND STROKE: ICD-10-CM

## 2023-11-08 LAB
ALBUMIN SERPL BCP-MCNC: 4.4 G/DL (ref 3.5–5)
ALP SERPL-CCNC: 53 U/L (ref 34–104)
ALT SERPL W P-5'-P-CCNC: 26 U/L (ref 7–52)
ANION GAP SERPL CALCULATED.3IONS-SCNC: 6 MMOL/L
AST SERPL W P-5'-P-CCNC: 18 U/L (ref 13–39)
BILIRUB SERPL-MCNC: 0.5 MG/DL (ref 0.2–1)
BUN SERPL-MCNC: 16 MG/DL (ref 5–25)
CALCIUM SERPL-MCNC: 8.9 MG/DL (ref 8.4–10.2)
CHLORIDE SERPL-SCNC: 105 MMOL/L (ref 96–108)
CHOLEST SERPL-MCNC: 171 MG/DL
CO2 SERPL-SCNC: 26 MMOL/L (ref 21–32)
CREAT SERPL-MCNC: 0.75 MG/DL (ref 0.6–1.3)
GFR SERPL CREATININE-BSD FRML MDRD: 93 ML/MIN/1.73SQ M
GLUCOSE P FAST SERPL-MCNC: 94 MG/DL (ref 65–99)
HDLC SERPL-MCNC: 65 MG/DL
LDLC SERPL CALC-MCNC: 86 MG/DL (ref 0–100)
LDLC SERPL DIRECT ASSAY-MCNC: 95 MG/DL (ref 0–100)
NONHDLC SERPL-MCNC: 106 MG/DL
POTASSIUM SERPL-SCNC: 4 MMOL/L (ref 3.5–5.3)
PROT SERPL-MCNC: 6.9 G/DL (ref 6.4–8.4)
SODIUM SERPL-SCNC: 137 MMOL/L (ref 135–147)
TRIGL SERPL-MCNC: 100 MG/DL
TSH SERPL DL<=0.05 MIU/L-ACNC: 2.03 UIU/ML (ref 0.45–4.5)

## 2023-11-08 PROCEDURE — 83721 ASSAY OF BLOOD LIPOPROTEIN: CPT

## 2023-11-08 PROCEDURE — 80053 COMPREHEN METABOLIC PANEL: CPT

## 2023-11-08 PROCEDURE — 80061 LIPID PANEL: CPT

## 2023-11-08 PROCEDURE — 84443 ASSAY THYROID STIM HORMONE: CPT

## 2023-11-08 PROCEDURE — 36415 COLL VENOUS BLD VENIPUNCTURE: CPT

## 2023-11-10 ENCOUNTER — OFFICE VISIT (OUTPATIENT)
Dept: FAMILY MEDICINE CLINIC | Facility: CLINIC | Age: 48
End: 2023-11-10
Payer: COMMERCIAL

## 2023-11-10 VITALS
TEMPERATURE: 97.3 F | HEART RATE: 78 BPM | HEIGHT: 65 IN | DIASTOLIC BLOOD PRESSURE: 82 MMHG | OXYGEN SATURATION: 100 % | WEIGHT: 179.2 LBS | BODY MASS INDEX: 29.85 KG/M2 | SYSTOLIC BLOOD PRESSURE: 138 MMHG | RESPIRATION RATE: 16 BRPM

## 2023-11-10 DIAGNOSIS — Z86.73 H/O TIA (TRANSIENT ISCHEMIC ATTACK) AND STROKE: ICD-10-CM

## 2023-11-10 DIAGNOSIS — G89.29 CHRONIC RIGHT SHOULDER PAIN: ICD-10-CM

## 2023-11-10 DIAGNOSIS — F41.9 ANXIETY: ICD-10-CM

## 2023-11-10 DIAGNOSIS — M54.2 NECK PAIN: ICD-10-CM

## 2023-11-10 DIAGNOSIS — M75.51 BURSITIS OF RIGHT SHOULDER: ICD-10-CM

## 2023-11-10 DIAGNOSIS — E78.49 OTHER HYPERLIPIDEMIA: Primary | ICD-10-CM

## 2023-11-10 DIAGNOSIS — M79.10 MYALGIA: ICD-10-CM

## 2023-11-10 DIAGNOSIS — E66.3 OVERWEIGHT: ICD-10-CM

## 2023-11-10 DIAGNOSIS — Z86.010 HISTORY OF COLON POLYPS: ICD-10-CM

## 2023-11-10 DIAGNOSIS — Z13.6 SCREENING FOR CARDIOVASCULAR CONDITION: ICD-10-CM

## 2023-11-10 DIAGNOSIS — D68.61 APS (ANTIPHOSPHOLIPID SYNDROME) (HCC): ICD-10-CM

## 2023-11-10 DIAGNOSIS — J30.9 ALLERGIC RHINITIS, UNSPECIFIED SEASONALITY, UNSPECIFIED TRIGGER: ICD-10-CM

## 2023-11-10 DIAGNOSIS — Z13.1 DIABETES MELLITUS SCREENING: ICD-10-CM

## 2023-11-10 DIAGNOSIS — Z23 ENCOUNTER FOR IMMUNIZATION: ICD-10-CM

## 2023-11-10 DIAGNOSIS — M79.7 FIBROMYALGIA: ICD-10-CM

## 2023-11-10 DIAGNOSIS — M25.511 CHRONIC RIGHT SHOULDER PAIN: ICD-10-CM

## 2023-11-10 PROBLEM — N92.1 MENORRHAGIA WITH IRREGULAR CYCLE: Status: RESOLVED | Noted: 2023-05-25 | Resolved: 2023-11-10

## 2023-11-10 PROCEDURE — 99214 OFFICE O/P EST MOD 30 MIN: CPT | Performed by: FAMILY MEDICINE

## 2023-11-10 PROCEDURE — 90471 IMMUNIZATION ADMIN: CPT

## 2023-11-10 PROCEDURE — 90686 IIV4 VACC NO PRSV 0.5 ML IM: CPT

## 2023-11-10 RX ORDER — CYCLOBENZAPRINE HCL 10 MG
10 TABLET ORAL 3 TIMES DAILY PRN
Qty: 90 TABLET | Refills: 0 | Status: SHIPPED | OUTPATIENT
Start: 2023-11-10

## 2023-11-10 RX ORDER — ATORVASTATIN CALCIUM 10 MG/1
10 TABLET, FILM COATED ORAL
Qty: 90 TABLET | Refills: 1 | Status: SHIPPED | OUTPATIENT
Start: 2023-11-10

## 2023-11-10 RX ORDER — MELOXICAM 15 MG/1
15 TABLET ORAL DAILY PRN
Qty: 90 TABLET | Refills: 1 | Status: SHIPPED | OUTPATIENT
Start: 2023-11-10

## 2023-11-10 NOTE — ASSESSMENT & PLAN NOTE
Stable on Flexeril 10 mg TID P.r.n. To use sparingly-side effects discussed and Mobic 15mg QD PRN. Encouraged increased exercise, stress relief, massage, and aqua therapy as patient does not want to take any medications that would alter her neurotransmitters.

## 2023-11-10 NOTE — ASSESSMENT & PLAN NOTE
Worsening. Patient needs to complete a Weight Management program prior to coverage for GLP-1 agonist.  Recommend lifestyle modifications.

## 2023-11-10 NOTE — PROGRESS NOTES
Assessment/Plan:  Problem List Items Addressed This Visit          Respiratory    AR (allergic rhinitis)     Stable on Zyrtec p.r.n. Relevant Medications    meloxicam (MOBIC) 15 mg tablet       Hematopoietic and Hemostatic    APS (antiphospholipid syndrome) (HCC)     Stable. Continue ASA. Relevant Medications    meloxicam (MOBIC) 15 mg tablet       Other    H/O TIA (transient ischemic attack) and stroke     Stable. Continue Aspirin, Lipitor 10mg 1 pill nightly. Advise lifestyle modifications. Relevant Medications    atorvastatin (LIPITOR) 10 mg tablet    Fibromyalgia     Stable on Flexeril 10 mg TID P.r.n. To use sparingly-side effects discussed and Mobic 15mg QD PRN. Encouraged increased exercise, stress relief, massage, and aqua therapy as patient does not want to take any medications that would alter her neurotransmitters. Relevant Medications    cyclobenzaprine (FLEXERIL) 10 mg tablet    meloxicam (MOBIC) 15 mg tablet    Other Relevant Orders    Vitamin D 25 hydroxy    Overweight     Worsening. Patient needs to complete a Weight Management program prior to coverage for GLP-1 agonist.  Recommend lifestyle modifications. Other hyperlipidemia - Primary     Stable. Continue Lipitor 10mg 1 pill nightly. Advise lifestyle modifications. Relevant Medications    atorvastatin (LIPITOR) 10 mg tablet    Other Relevant Orders    CBC and differential    Comprehensive metabolic panel    Lipid panel    TSH, 3rd generation with Free T4 reflex    Anxiety     Stable status post weaning Cymbalta 60 mg daily. Relevant Orders    TSH, 3rd generation with Free T4 reflex    Neck pain     Stable status post wean Cymbalta 60 mg daily. Continue Mobic 15mg QD PRN. History of colon polyps     Colonoscopy is up-to-date.           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  Pending Sports Med consult for consideration of CSI. Advise Mobic / Tylenol PRN. Bursitis of right shoulder        Relevant Orders    Ambulatory Referral to Orthopedic Surgery    See above. Screening for cardiovascular condition        Relevant Orders    CBC and differential    Comprehensive metabolic panel    Lipid panel    LDL cholesterol, direct    Diabetes mellitus screening        Relevant Orders    Hemoglobin A1C    Myalgia        Relevant Orders    Vitamin D 25 hydroxy             Return in about 6 months (around 5/10/2024) for Physical / 6mo- HL, Anxiety, FM, CVA, TIA, s/p PFO, AR, Labs. Future Appointments   Date Time Provider [address]   5/17/2024 10:20 AM Kevin Mcnally, DO [facility]          Subjective:     Shahla Bryan is a 46 y.o. female who presents today for a follow-up on her chronic medical conditions. HPI:  Chief Complaint   Patient presents with   • Follow-up     6mo- HL, Anxiety, FM, CVA, TIA, s/p PFO, AR, Labs. Labs done 11/8. • Shoulder Problem     R shoulder pain for the last 3-4 months. Injured her shoulder golfing in July and then aggravated the shoulder playing field hockey approx one month later. 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 Dull aching pain, average pain is 3-4/10. Lifts pts at work all day. Would like to know if she can get a few days of steroids to help. • HM     Flu shot today, ordered. No additional covid boosters. -- Above per clinical staff and reviewed. --      HPI      Today:      Return in about 6 months (around 11/12/2023) for 6mo- HL, Anxiety, FM, CVA, TIA, s/p PFO, AR, Labs.      6mo OV    Right Shoulder Pain - Symptoms x 4 months. Occurred after going [place] [employer], stretching helpful. Then played field hockey c daughter, which made symptoms worse. Massage x 2 helpful. Subscapularis, Right AC joint pain. Dull, achy pain. Improved c movement, sore thereafter. Has decreased external rotation. Denies numbness and weakness. Using Mobic 15mg QD and Tylenol, ice. Overweight - Watching diet - eating less sugar and carbs. +Exercise - Stair stepper for 30 minutes, 2-3 times per week. Hyperlipidemia - On Lipitor 10mg QHS. Fibromyalgia / Arthralgias / Myalgias - She is feeling well since D/C Cymbalta - sleep improved, regular BM, no longer has night sweats or vivid dreams. Using Flexeril 10mg TID PRN c benefit - taking 2-3 times nights per week during the work week. Myalgias worse c cold weather and heavy lifting at work. She is using Mobic 15mg QD PRN a 1 time per week. She weaned off Cymbalta 60mg QD due to:      1. Sleep issues - choppy sleep, no longer has crazy dreams, no longer talking in sleep. She feels less wired. She now feels tired at bedtime. She is able to fall asleep easily, has not awoke in the past week, except last night x 1 for nocturia. Sleeping 6-7 hours per night. She can sleep in when her schedule allows. Feels well rested. 2.  Intermittent achiness - Still achy in shoulders, which has been worsning since she weaned Cymbalta. She has been using more Advil 800mg 1-2 times per day and Tylenol 1000 1-2 times daily as needed c benefit. She would to return to [facility] [employer] [facility], but she is closed to [facility]. 3.  Sexual dysfunction - Low libido, decreased vaginal sensation. She has not been sexually active recently since D/C Cymbalta. Has neck, shoulder, thoracic pain. Clicking in her spine. Denies trauma. Denies joint redness, swelling, warmth in joint. Using CBD cream helpful. Headaches / Burning Neck Pain - Saw Ortho Dr. Aditi Hoffman, s/p PT. Resolved. Intermittent symptoms of neck pain c weather change. Previously using Voltaren Gel c benefit when Tylenol / Motrin unhelpful. HA improved since PFO repaired. Anxiety - Self-weaned Cymbalta 60mg QD as above and Zoloft 50mg QD  due to gaining weight. She is taking CBD oil instead since . Mood is stable, sleep is improved. No SI/HI/AH/VH. Feels safe at home. Drinking 12oz coffee daily. PHQ-2/9 Depression Screening    Little interest or pleasure in doing things: 0 - not at all  Feeling down, depressed, or hopeless: 0 - not at all  Trouble falling or staying asleep, or sleeping too much: 0 - not at all  Feeling tired or having little energy: 0 - not at all  Poor appetite or overeatin - not at all  Feeling bad about yourself - or that you are a failure or have let yourself or your family down: 0 - not at all  Trouble concentrating on things, such as reading the newspaper or watching television: 0 - not at all  Moving or speaking so slowly that other people could have noticed. Or the opposite - being so fidgety or restless that you have been moving around a lot more than usual: 0 - not at all  Thoughts that you would be better off dead, or of hurting yourself in some way: 0 - not at all  PHQ-2 Score: 0  PHQ-2 Interpretation: Negative depression screen  PHQ-9 Score: 0   PHQ-9 Interpretation: No or Minimal depression        ALYCE-7 Flowsheet Screening    Flowsheet Row Most Recent Value   Over the last 2 weeks, how often have you been bothered by any of the following problems? Feeling nervous, anxious, or on edge 0   Not being able to stop or control worrying 0   Worrying too much about different things 0   Trouble relaxing 0   Being so restless that it is hard to sit still 0   Becoming easily annoyed or irritable 0   Feeling afraid as if something awful might happen 0   ALYCE-7 Total Score 0             Discolored Fingernails - Symptoms for months, unchanged.   Left 4th toe, Right 3rd and 4th finger with hyperpigmented vertical stripe. She has been wearing acrylic nails for a long time. Denies itching, pain, bleeding. Derm thinks benign hyperpigmentation. Next appt 12/23. Constipation - Management per GI Dr. Wanda Celis. Next appt PRN? Advises Miralax PRN, which patient has not tried. Chronic. Improved c dietary changes. Has BM daily BM (prevoiusly 2-3) times per week. No longer has nugget stools. Sometimes has stomach twisting. Uses Bentyl PRN rarely - last used 2019. Uses Laxatives - OTC store brand PRN - 3 times per month. She states stool softeners not helpful. Antiphospholipid Syndrome / CVA 4/12/17, TIA 7/19/17 -   +CVA on MRI. +ELIZABETH c Biatrial PFO. Neuro started ASA 81mg and Lipitor 40mg QHS. Thrombotic risk panel showed - Cardiolipin IgM positive, which Neuro believes is a transient finding. Neuro plans to repeat Cardiolipin IgM 7/17 - repeated 9/7/17 and again positive. Next Neuro appt 5/19 / PRN. s/p PFO closure 4/25/17 c Cardio Dr. Chapin Edwards. s/p thrombolytic  at [facility] 2/4/18 c Dr. Grady Irene - Advises ASA 81mg QD and possible Holter / Loop - pt will discuss c Neuro. Neuro Dr. Conor Tobar declined recommendation per patient. TIA 4/19/17- Pt returned to ER with funny feeling in head and LUE (not RUE) numbness. MRI revealed no evidence of CVA. Neuro and Cardio advised Plavix for at least 6 months and ASA 81mg for life. I/P team decreased Lipitor 40mg 1/2 pill QHS as pt was experiencing hair loss. She has been taking Lipitor 10mg QHS regularly. APS - s/p Neuro input. Currently on ASA, D/C Plavix. AR - Stable on Zyrtec PRN. H/O Colon Polyps - Last colonoscopy 6/9/23 - repeat in 7 years - 6/9/30. She quit Vaping. Reviewed:   Labs 11/8/23, Ortho 12/11/17, Cardio 11/8/17, Rheum 10/16/17, Heme/Onc 10/4/17, Neuro 5/31/18       Sees Gyn Dr. Brayan Huynh yearly. Next appt 5//24. Next Pap due 2026. Pending ablation for DUB. The following portions of the patient's history were reviewed and updated as appropriate: allergies, current medications, past family history, past medical history, past social history, past surgical history and problem list.      Review of Systems   Constitutional:  Negative for appetite change, chills, diaphoresis, fatigue and fever. Respiratory:  Negative for chest tightness and shortness of breath. Cardiovascular:  Negative for chest pain. Gastrointestinal:  Negative for abdominal pain, blood in stool, diarrhea, nausea and vomiting. Genitourinary:  Negative for dysuria. Musculoskeletal:  Positive for arthralgias.         Current Outpatient Medications   Medication Sig Dispense Refill   • ascorbic acid (VITAMIN C) 500 mg tablet Take 500 mg by mouth daily     • aspirin 81 MG tablet Take 81 mg by mouth daily     • atorvastatin (LIPITOR) 10 mg tablet Take 1 tablet (10 mg total) by mouth daily at bedtime 90 tablet 1   • Calcium Carbonate Antacid 648 MG TABS Take 648 mg by mouth daily     • cetirizine (ZyrTEC) 10 mg tablet Take 10 mg by mouth daily as needed      • Cholecalciferol 25 MCG (1000 UT) capsule Take 2,000 Units by mouth daily     • Coenzyme Q10 300 MG CAPS Take 300 mg by mouth daily     • cyclobenzaprine (FLEXERIL) 10 mg tablet Take 1 tablet (10 mg total) by mouth 3 (three) times a day as needed for muscle spasms 90 tablet 0   • dicyclomine (BENTYL) 20 mg tablet Take 20 mg by mouth every 6 (six) hours as needed     • ferrous sulfate 325 (65 Fe) mg tablet Take 325 mg by mouth every other day      • Magnesium Gluconate 550 MG TABS Take 550 mg by mouth daily      • meloxicam (MOBIC) 15 mg tablet Take 1 tablet (15 mg total) by mouth daily as needed for moderate pain 90 tablet 1   • Multiple Vitamin (TAB-A-CORWIN) TABS Take 1 tablet by mouth daily     • Omega-3 Fatty Acids (FISH OIL) 1,000 mg Take 1,000 mg by mouth daily     • polyethylene glycol (GLYCOLAX) 17 GM/SCOOP powder Take 17 g by mouth daily 255 g 0   • zinc gluconate 50 mg tablet Take 50 mg by mouth daily       No current facility-administered medications for this visit. Objective:  /82   Pulse 78   Temp (!) 97.3 °F (36.3 °C)   Resp 16   Ht 5' 5" (1.651 m)   Wt 81.3 kg (179 lb 3.2 oz)   LMP 10/10/2023 (Exact Date)   SpO2 100%   Breastfeeding No   BMI 29.82 kg/m²    Wt Readings from Last 3 Encounters:   11/10/23 81.3 kg (179 lb 3.2 oz)   06/09/23 78.5 kg (173 lb)   05/25/23 80 kg (176 lb 5.9 oz)      BP Readings from Last 3 Encounters:   11/10/23 138/82   06/09/23 115/69   05/25/23 125/72          Physical Exam  Vitals and nursing note reviewed. Constitutional:       Appearance: Normal appearance. She is well-developed. She is obese. HENT:      Head: Normocephalic and atraumatic. Eyes:      Conjunctiva/sclera: Conjunctivae normal.   Neck:      Thyroid: No thyromegaly. Cardiovascular:      Rate and Rhythm: Normal rate and regular rhythm. Pulses: Normal pulses. Heart sounds: Normal heart sounds. Pulmonary:      Effort: Pulmonary effort is normal.      Breath sounds: Normal breath sounds. Abdominal:      General: Bowel sounds are normal. There is no distension. Palpations: Abdomen is soft. There is no mass. Tenderness: There is no abdominal tenderness. There is no guarding or rebound. Musculoskeletal:         General: No swelling or tenderness. Cervical back: Neck supple. Right lower leg: No edema. Left lower leg: No edema. Comments: B/L Clavicles Stable. B/L Shoulder c active ROM. Right Shoulder Bursa and Subscapularis Tender to Palpation. Lymphadenopathy:      Cervical: No cervical adenopathy. Neurological:      General: No focal deficit present. Mental Status: She is alert and oriented to person, place, and time. Psychiatric:         Mood and Affect: Mood normal.         Behavior: Behavior normal.         Thought Content:  Thought content normal. Judgment: Judgment normal.         Lab Results:      Lab Results   Component Value Date    WBC 5.91 05/05/2023    HGB 12.2 05/05/2023    HCT 37.7 05/05/2023     05/05/2023    TRIG 100 11/08/2023    HDL 65 11/08/2023    LDLDIRECT 95 11/08/2023    ALT 26 11/08/2023    AST 18 11/08/2023    K 4.0 11/08/2023     11/08/2023    CREATININE 0.75 11/08/2023    BUN 16 11/08/2023    CO2 26 11/08/2023    GLUF 94 11/08/2023    HGBA1C 4.9 05/05/2023     Lab Results   Component Value Date    URICACID 3.6 11/22/2019     Invalid input(s): "BASENAME" Vitamin D    No results found. POCT Labs        Depression Screening and Follow-up Plan: Patient was screened for depression during today's encounter. They screened negative with a PHQ-2 score of 0.

## 2024-05-04 DIAGNOSIS — Z86.73 H/O TIA (TRANSIENT ISCHEMIC ATTACK) AND STROKE: ICD-10-CM

## 2024-05-04 DIAGNOSIS — M79.7 FIBROMYALGIA: ICD-10-CM

## 2024-05-06 RX ORDER — ATORVASTATIN CALCIUM 10 MG/1
10 TABLET, FILM COATED ORAL
Qty: 90 TABLET | Refills: 1 | Status: SHIPPED | OUTPATIENT
Start: 2024-05-06

## 2024-05-06 RX ORDER — MELOXICAM 15 MG/1
15 TABLET ORAL DAILY PRN
Qty: 90 TABLET | Refills: 1 | Status: SHIPPED | OUTPATIENT
Start: 2024-05-06

## 2024-05-07 RX ORDER — CYCLOBENZAPRINE HCL 10 MG
10 TABLET ORAL 3 TIMES DAILY PRN
Qty: 90 TABLET | Refills: 0 | Status: SHIPPED | OUTPATIENT
Start: 2024-05-07

## 2024-06-04 ENCOUNTER — LAB (OUTPATIENT)
Dept: LAB | Facility: CLINIC | Age: 49
End: 2024-06-04
Payer: COMMERCIAL

## 2024-06-04 DIAGNOSIS — E78.49 OTHER HYPERLIPIDEMIA: ICD-10-CM

## 2024-06-04 DIAGNOSIS — M79.7 FIBROMYALGIA: ICD-10-CM

## 2024-06-04 DIAGNOSIS — M79.10 MYALGIA: ICD-10-CM

## 2024-06-04 DIAGNOSIS — F41.9 ANXIETY: ICD-10-CM

## 2024-06-04 DIAGNOSIS — Z13.1 DIABETES MELLITUS SCREENING: ICD-10-CM

## 2024-06-04 DIAGNOSIS — Z13.6 SCREENING FOR CARDIOVASCULAR CONDITION: ICD-10-CM

## 2024-06-04 LAB
25(OH)D3 SERPL-MCNC: 64 NG/ML (ref 30–100)
ALBUMIN SERPL BCP-MCNC: 4.5 G/DL (ref 3.5–5)
ALP SERPL-CCNC: 50 U/L (ref 34–104)
ALT SERPL W P-5'-P-CCNC: 18 U/L (ref 7–52)
ANION GAP SERPL CALCULATED.3IONS-SCNC: 10 MMOL/L (ref 4–13)
AST SERPL W P-5'-P-CCNC: 17 U/L (ref 13–39)
BASOPHILS # BLD AUTO: 0.05 THOUSANDS/ÂΜL (ref 0–0.1)
BASOPHILS NFR BLD AUTO: 1 % (ref 0–1)
BILIRUB SERPL-MCNC: 1 MG/DL (ref 0.2–1)
BUN SERPL-MCNC: 13 MG/DL (ref 5–25)
CALCIUM SERPL-MCNC: 9.2 MG/DL (ref 8.4–10.2)
CHLORIDE SERPL-SCNC: 103 MMOL/L (ref 96–108)
CHOLEST SERPL-MCNC: 211 MG/DL
CO2 SERPL-SCNC: 25 MMOL/L (ref 21–32)
CREAT SERPL-MCNC: 0.74 MG/DL (ref 0.6–1.3)
EOSINOPHIL # BLD AUTO: 0.15 THOUSAND/ÂΜL (ref 0–0.61)
EOSINOPHIL NFR BLD AUTO: 3 % (ref 0–6)
ERYTHROCYTE [DISTWIDTH] IN BLOOD BY AUTOMATED COUNT: 11.6 % (ref 11.6–15.1)
EST. AVERAGE GLUCOSE BLD GHB EST-MCNC: 114 MG/DL
GFR SERPL CREATININE-BSD FRML MDRD: 94 ML/MIN/1.73SQ M
GLUCOSE P FAST SERPL-MCNC: 94 MG/DL (ref 65–99)
HBA1C MFR BLD: 5.6 %
HCT VFR BLD AUTO: 40.1 % (ref 34.8–46.1)
HDLC SERPL-MCNC: 59 MG/DL
HGB BLD-MCNC: 13.4 G/DL (ref 11.5–15.4)
IMM GRANULOCYTES # BLD AUTO: 0.01 THOUSAND/UL (ref 0–0.2)
IMM GRANULOCYTES NFR BLD AUTO: 0 % (ref 0–2)
LDLC SERPL CALC-MCNC: 135 MG/DL (ref 0–100)
LDLC SERPL DIRECT ASSAY-MCNC: 142 MG/DL (ref 0–100)
LYMPHOCYTES # BLD AUTO: 1.43 THOUSANDS/ÂΜL (ref 0.6–4.47)
LYMPHOCYTES NFR BLD AUTO: 29 % (ref 14–44)
MCH RBC QN AUTO: 30.7 PG (ref 26.8–34.3)
MCHC RBC AUTO-ENTMCNC: 33.4 G/DL (ref 31.4–37.4)
MCV RBC AUTO: 92 FL (ref 82–98)
MONOCYTES # BLD AUTO: 0.45 THOUSAND/ÂΜL (ref 0.17–1.22)
MONOCYTES NFR BLD AUTO: 9 % (ref 4–12)
NEUTROPHILS # BLD AUTO: 2.9 THOUSANDS/ÂΜL (ref 1.85–7.62)
NEUTS SEG NFR BLD AUTO: 58 % (ref 43–75)
NONHDLC SERPL-MCNC: 152 MG/DL
NRBC BLD AUTO-RTO: 0 /100 WBCS
PLATELET # BLD AUTO: 306 THOUSANDS/UL (ref 149–390)
PMV BLD AUTO: 10.9 FL (ref 8.9–12.7)
POTASSIUM SERPL-SCNC: 3.7 MMOL/L (ref 3.5–5.3)
PROT SERPL-MCNC: 7.3 G/DL (ref 6.4–8.4)
RBC # BLD AUTO: 4.37 MILLION/UL (ref 3.81–5.12)
SODIUM SERPL-SCNC: 138 MMOL/L (ref 135–147)
TRIGL SERPL-MCNC: 83 MG/DL
TSH SERPL DL<=0.05 MIU/L-ACNC: 1.67 UIU/ML (ref 0.45–4.5)
WBC # BLD AUTO: 4.99 THOUSAND/UL (ref 4.31–10.16)

## 2024-06-04 PROCEDURE — 83721 ASSAY OF BLOOD LIPOPROTEIN: CPT

## 2024-06-04 PROCEDURE — 82306 VITAMIN D 25 HYDROXY: CPT

## 2024-06-04 PROCEDURE — 83036 HEMOGLOBIN GLYCOSYLATED A1C: CPT

## 2024-06-04 PROCEDURE — 36415 COLL VENOUS BLD VENIPUNCTURE: CPT

## 2024-06-04 PROCEDURE — 85025 COMPLETE CBC W/AUTO DIFF WBC: CPT

## 2024-06-04 PROCEDURE — 80053 COMPREHEN METABOLIC PANEL: CPT

## 2024-06-04 PROCEDURE — 80061 LIPID PANEL: CPT

## 2024-06-04 PROCEDURE — 84443 ASSAY THYROID STIM HORMONE: CPT

## 2024-06-06 ENCOUNTER — OFFICE VISIT (OUTPATIENT)
Dept: FAMILY MEDICINE CLINIC | Facility: CLINIC | Age: 49
End: 2024-06-06
Payer: COMMERCIAL

## 2024-06-06 VITALS
WEIGHT: 173.2 LBS | DIASTOLIC BLOOD PRESSURE: 84 MMHG | BODY MASS INDEX: 28.86 KG/M2 | HEART RATE: 87 BPM | HEIGHT: 65 IN | SYSTOLIC BLOOD PRESSURE: 128 MMHG | RESPIRATION RATE: 16 BRPM | OXYGEN SATURATION: 98 % | TEMPERATURE: 97.6 F

## 2024-06-06 DIAGNOSIS — E78.49 OTHER HYPERLIPIDEMIA: ICD-10-CM

## 2024-06-06 DIAGNOSIS — J30.9 ALLERGIC RHINITIS, UNSPECIFIED SEASONALITY, UNSPECIFIED TRIGGER: ICD-10-CM

## 2024-06-06 DIAGNOSIS — F41.9 ANXIETY: ICD-10-CM

## 2024-06-06 DIAGNOSIS — D68.61 APS (ANTIPHOSPHOLIPID SYNDROME) (HCC): ICD-10-CM

## 2024-06-06 DIAGNOSIS — Z86.010 HISTORY OF COLON POLYPS: ICD-10-CM

## 2024-06-06 DIAGNOSIS — Z86.73 H/O TIA (TRANSIENT ISCHEMIC ATTACK) AND STROKE: ICD-10-CM

## 2024-06-06 DIAGNOSIS — M54.2 NECK PAIN: ICD-10-CM

## 2024-06-06 DIAGNOSIS — E66.3 OVERWEIGHT: ICD-10-CM

## 2024-06-06 DIAGNOSIS — Z00.00 ANNUAL PHYSICAL EXAM: Primary | ICD-10-CM

## 2024-06-06 DIAGNOSIS — M79.7 FIBROMYALGIA: ICD-10-CM

## 2024-06-06 PROCEDURE — 99396 PREV VISIT EST AGE 40-64: CPT | Performed by: FAMILY MEDICINE

## 2024-06-06 RX ORDER — ATORVASTATIN CALCIUM 10 MG/1
10 TABLET, FILM COATED ORAL
Qty: 90 TABLET | Refills: 1 | Status: SHIPPED | OUTPATIENT
Start: 2024-06-06

## 2024-06-06 RX ORDER — PHENOL 1.4 %
600 AEROSOL, SPRAY (ML) MUCOUS MEMBRANE DAILY
COMMUNITY

## 2024-06-06 RX ORDER — UBIDECARENONE 75 MG
1 CAPSULE ORAL DAILY
COMMUNITY

## 2024-06-06 NOTE — PROGRESS NOTES
Assessment/Plan:  Problem List Items Addressed This Visit          Respiratory    AR (allergic rhinitis)     Stable on Zyrtec p.r.n.            Hematopoietic and Hemostatic    APS (antiphospholipid syndrome) (HCC)     Stable.  Continue ASA.              Behavioral Health    Anxiety     Stable status post weaning Cymbalta 60 mg daily.         Relevant Orders    TSH, 3rd generation with Free T4 reflex       Surgery/Wound/Pain    Fibromyalgia     Stable on Flexeril 10 mg TID P.r.n. To use sparingly-side effects discussed and Mobic 15mg QD PRN.    Encouraged increased exercise, stress relief, massage, and aqua therapy as patient does not want to take any medications that would alter her neurotransmitters.         Neck pain     Stable status post wean Cymbalta 60 mg daily.  Continue Mobic 15mg QD PRN.              Neurology/Sleep    H/O TIA (transient ischemic attack) and stroke     Stable.  Continue Aspirin, Lipitor 10mg 1 pill nightly.  Advise lifestyle modifications.         Relevant Medications    atorvastatin (LIPITOR) 10 mg tablet       Other    Overweight     Improved on compounded Semaglutide per [facility].  Patient needs to complete a formal Weight Management program prior to coverage for Rx GLP-1 agonist.  Recommend lifestyle modifications.           Relevant Orders    TSH, 3rd generation with Free T4 reflex    Other hyperlipidemia     Worsening due to recent medication noncompliance.  Continue Lipitor 10mg 1 pill nightly.  Advise lifestyle modifications.         Relevant Medications    atorvastatin (LIPITOR) 10 mg tablet    Other Relevant Orders    Comprehensive metabolic panel    Lipid panel    TSH, 3rd generation with Free T4 reflex    LDL cholesterol, direct    History of colon polyps     Colonoscopy is up-to-date.          Other Visit Diagnoses       Annual physical exam    -  Primary    Health Maintenance   Topic Date Due   • COVID-19 Vaccine (4 - 2023-24 season) 09/06/2024 (Originally  9/1/2023)   • Zoster Vaccine (1 of 2) 06/06/2025 (Originally 6/1/2023)   • Breast Cancer Screening: Mammogram  04/30/2025   • Depression Screening  06/06/2025   • Annual Physical  06/06/2025   • Cervical Cancer Screening  01/25/2026   • Colorectal Cancer Screening  06/07/2030   • DTaP,Tdap,and Td Vaccines (2 - Td or Tdap) 01/14/2032   • RSV Vaccine Age 60+ Years (1 - 1-dose 60+ series) 06/01/2033   • HIV Screening  Completed   • Hepatitis C Screening  Completed   • Influenza Vaccine  Completed   • RSV Vaccine age 0-20 Months  Aged Out   • Pneumococcal Vaccine: Pediatrics (0 to 5 Years) and At-Risk Patients (6 to 64 Years)  Aged Out   • HIB Vaccine  Aged Out   • IPV Vaccine  Aged Out   • Hepatitis A Vaccine  Aged Out   • Meningococcal ACWY Vaccine  Aged Out   • HPV Vaccine  Aged Out         BMI 28.0-28.9,adult        Relevant Orders    TSH, 3rd generation with Free T4 reflex               Return in about 6 months (around 12/6/2024) for 6mo- HL, Anxiety, FM, CVA, TIA, s/p PFO, AR, Labs.      Future Appointments   Date Time Provider Department Center   1/10/2025 10:00 AM Joie Logan DO [facility]          Subjective:     Gema is a 49 y.o. female who presents today for a follow-up on her chronic medical conditions.        HPI:  Chief Complaint   Patient presents with   • Physical Exam     Physical / 6mo- HL, Anxiety, FM, CVA, TIA, s/p PFO, AR, Labs. No new problems or concerns at this time.      -- Above per clinical staff and reviewed. --      HPI      Today:      Return in about 6 months (around 5/10/2024) for Physical / 6mo- HL, Anxiety, FM, CVA, TIA, s/p PFO, AR, Labs.     6mo OV        Overweight - Management per [facility].  Taking Compounded Semaglutide 0.75mg SC weekly x 2 weeks, plans to increase in additional to 2 weeks to 1mg.  Watching diet - eating less sugar and carbs.  +Exercise - Walking for 20 minutes, 4-5 days per week.      Hyperlipidemia - On Lipitor 10mg QHS -  restarted 2 days ago, missed for 3 weeks due to not taking meds.         Fibromyalgia / Arthralgias / Myalgias - She is feeling well since D/C Cymbalta - sleep improved, regular BM, no longer has night sweats or vivid dreams.  Using Flexeril 10mg TID PRN c benefit - taking 1-2 times nights per week during the work week.  Myalgias worse c cold weather and heavy lifting at work.  She is using Mobic 15mg QD PRN a 1-2 times per week.       She weaned off Cymbalta 60mg QD due to:      1.  Sleep issues - choppy sleep, no longer has crazy dreams, no longer talking in sleep.  She feels less wired.  She now feels tired at bedtime.  She is able to fall asleep easily, has not awoke in the past week, except last night x 1 for nocturia.  Sleeping 6-7 hours per night.  She can sleep in when her schedule allows.  Feels well rested.        2.  Intermittent achiness - Still achy in shoulders, which has been worsning since she weaned Cymbalta.  She has been using more Advil 800mg 1-2 times per day and Tylenol 1000 1-2 times daily as needed c benefit.  She would to return to [facility] [place], but she is closed to COVID.      3.  Sexual dysfunction - Low libido, decreased vaginal sensation.  She has not been sexually active recently since D/C Cymbalta.        Has neck, shoulder, thoracic pain.  Clicking in her spine.  Denies trauma.  Denies joint redness, swelling, warmth in joint.  Using CBD cream helpful.           Headaches / Burning Neck Pain - Saw Ortho Dr. Grove, s/p PT. Resolved.   Intermittent symptoms of neck pain c weather change.   Previously using Voltaren Gel c benefit when Tylenol / Motrin unhelpful.  HA improved since PFO repaired.      Anxiety - Self-weaned Cymbalta 60mg QD as above and Zoloft 50mg QD 8/18 due to gaining weight. She is taking CBD oil instead since 8/18. Mood is stable, sleep is improved. No SI/HI/AH/VH.  Feels safe at home.  Drinking 12oz coffee daily.          PHQ-2/9 Depression Screening    Little interest  or pleasure in doing things: 0 - not at all  Feeling down, depressed, or hopeless: 0 - not at all  Trouble falling or staying asleep, or sleeping too much: 0 - not at all  Feeling tired or having little energy: 0 - not at all  Poor appetite or overeatin - not at all  Feeling bad about yourself - or that you are a failure or have let yourself or your family down: 0 - not at all  Trouble concentrating on things, such as reading the newspaper or watching television: 0 - not at all  Moving or speaking so slowly that other people could have noticed. Or the opposite - being so fidgety or restless that you have been moving around a lot more than usual: 0 - not at all  Thoughts that you would be better off dead, or of hurting yourself in some way: 0 - not at all  PHQ-2 Score: 0  PHQ-2 Interpretation: Negative depression screen  PHQ-9 Score: 0  PHQ-9 Interpretation: No or Minimal depression       ALYCE-7 Flowsheet Screening    Flowsheet Row Most Recent Value   Over the last 2 weeks, how often have you been bothered by any of the following problems?    Feeling nervous, anxious, or on edge 0   Not being able to stop or control worrying 0   Worrying too much about different things 0   Trouble relaxing 0   Being so restless that it is hard to sit still 0   Becoming easily annoyed or irritable 0   Feeling afraid as if something awful might happen 0   ALYCE-7 Total Score 0           Discolored Fingernails - Symptoms for months, unchanged.  Left 4th toe, Right 3rd and 4th finger with hyperpigmented vertical stripe.  She has been wearing acrylic nails for a long time.  Denies itching, pain, bleeding.  Derm thinks benign hyperpigmentation.  Next appt  - Overdue.     Constipation - Management per GI Dr. Jaiyeola.  Next appt PRN?  Advises Miralax PRN, which patient has not tried.  Chronic. Improved c dietary changes.  Has BM daily BM (prevoiusly 2-3) times per week.  No longer has nugget stools. Sometimes has stomach twisting.  Uses Bentyl PRN rarely - last used 2019. Uses Laxatives - OTC store brand PRN - 3 times per month. She states stool softeners not helpful.        Antiphospholipid Syndrome / CVA 4/12/17, TIA 7/19/17 -   +CVA on MRI.  +ELIZABETH c Biatrial PFO.  Neuro started ASA 81mg and Lipitor 40mg QHS.  Thrombotic risk panel showed - Cardiolipin IgM positive, which Neuro believes is a transient finding.  Neuro plans to repeat Cardiolipin IgM 7/17 - repeated 9/7/17 and again positive.  Next Neuro appt 5/19 / PRN.  s/p PFO closure 4/25/17 c Cardio Dr. Spear.  s/p thrombolytic  at [facility] 2/4/18 c Dr. Zuluaga - Advises ASA 81mg QD and possible Holter / Loop - pt will discuss c Neuro. Neuro Dr. Michele declined recommendation per patient.       TIA 4/19/17- Pt returned to ER with funny feeling in head and LUE (not RUE) numbness.  MRI revealed no evidence of CVA.  Neuro and Cardio advised Plavix for at least 6 months and ASA 81mg for life.  I/P team decreased Lipitor 40mg 1/2 pill QHS as pt was experiencing hair loss.  She has been taking Lipitor 10mg QHS regularly.        APS - s/p Neuro input.  Currently on ASA, D/C Plavix.             AR - Stable on Zyrtec PRN.     H/O Colon Polyps - Last colonoscopy 6/9/23 - repeat in 7 years - 6/9/30.    She quit Vaping.      Reviewed:   Labs 6/4/24, Ortho 12/11/17, Cardio 11/8/17, Rheum 10/16/17, Heme/Onc 10/4/17, Neuro 5/31/18       Sees Gyn Dr. Michelle Amaya yearly.  Next appt 5/24 - Overdue.  Next Pap due 2026.  Pending ablation for DUB.      Sees Dentist q6 months.  Sees Optho q2 years.      The following portions of the patient's history were reviewed and updated as appropriate: allergies, current medications, past family history, past medical history, past social history, past surgical history and problem list.      Review of Systems   Constitutional:  Positive for appetite change (Decreased due to Semaglutide). Negative for chills, diaphoresis, fatigue and fever.   Respiratory:   Negative for chest tightness and shortness of breath.    Cardiovascular:  Negative for chest pain.   Gastrointestinal:  Negative for abdominal pain, blood in stool, diarrhea, nausea and vomiting.   Genitourinary:  Negative for dysuria.        Current Outpatient Medications   Medication Sig Dispense Refill   • ascorbic acid (VITAMIN C) 500 mg tablet Take 500 mg by mouth daily     • aspirin 81 MG tablet Take 81 mg by mouth daily     • atorvastatin (LIPITOR) 10 mg tablet Take 1 tablet (10 mg total) by mouth daily at bedtime 90 tablet 1   • calcium carbonate (OS-MOMO) 600 MG tablet Take 600 mg by mouth daily     • Calcium Carbonate Antacid 648 MG TABS Take 648 mg by mouth daily     • cetirizine (ZyrTEC) 10 mg tablet Take 10 mg by mouth daily as needed      • Cholecalciferol 25 MCG (1000 UT) capsule Take 2,000 Units by mouth daily     • Coenzyme Q10 300 MG CAPS Take 300 mg by mouth daily     • cyanocobalamin (VITAMIN B-12) 100 mcg tablet Take 1 tablet by mouth daily     • cyclobenzaprine (FLEXERIL) 10 mg tablet Take 1 tablet (10 mg total) by mouth 3 (three) times a day as needed for muscle spasms 90 tablet 0   • dicyclomine (BENTYL) 20 mg tablet Take 20 mg by mouth every 6 (six) hours as needed     • ferrous sulfate 325 (65 Fe) mg tablet Take 325 mg by mouth every other day      • Magnesium Gluconate 550 MG TABS Take 550 mg by mouth daily      • meloxicam (MOBIC) 15 mg tablet Take 1 tablet (15 mg total) by mouth daily as needed for moderate pain 90 tablet 1   • Multiple Vitamin (TAB-A-CORWIN) TABS Take 1 tablet by mouth daily     • Omega-3 Fatty Acids (FISH OIL) 1,000 mg Take 1,000 mg by mouth daily     • polyethylene glycol (GLYCOLAX) 17 GM/SCOOP powder Take 17 g by mouth daily 255 g 0   • SEMAGLUTIDE-WEIGHT MANAGEMENT SC Inject 0.75 mg under the skin once a week Management per [facility]     • zinc gluconate 50 mg tablet Take 50 mg by mouth daily       No current facility-administered medications for this  "visit.       Objective:  /84   Pulse 87   Temp 97.6 °F (36.4 °C)   Resp 16   Ht 5' 5\" (1.651 m)   Wt 78.6 kg (173 lb 3.2 oz)   SpO2 98%   BMI 28.82 kg/m²    Wt Readings from Last 3 Encounters:   06/06/24 78.6 kg (173 lb 3.2 oz)   11/10/23 81.3 kg (179 lb 3.2 oz)   06/09/23 78.5 kg (173 lb)      BP Readings from Last 3 Encounters:   06/06/24 128/84   11/10/23 138/82   06/09/23 115/69          Physical Exam  Vitals and nursing note reviewed.   Constitutional:       Appearance: She is well-developed.   HENT:      Head: Normocephalic and atraumatic.      Right Ear: Tympanic membrane, ear canal and external ear normal.      Left Ear: Tympanic membrane, ear canal and external ear normal.      Nose: Nose normal.      Right Sinus: No maxillary sinus tenderness or frontal sinus tenderness.      Left Sinus: No maxillary sinus tenderness or frontal sinus tenderness.      Mouth/Throat:      Mouth: Mucous membranes are moist.      Pharynx: Oropharynx is clear. Uvula midline.      Tonsils: No tonsillar exudate.   Eyes:      Extraocular Movements: Extraocular movements intact.      Conjunctiva/sclera: Conjunctivae normal.      Pupils: Pupils are equal, round, and reactive to light.   Cardiovascular:      Rate and Rhythm: Normal rate and regular rhythm.      Pulses: Normal pulses.      Heart sounds: Normal heart sounds.   Pulmonary:      Effort: Pulmonary effort is normal.      Breath sounds: Normal breath sounds.   Abdominal:      General: Bowel sounds are normal. There is no distension.      Palpations: Abdomen is soft. There is no mass.      Tenderness: There is no abdominal tenderness. There is no guarding or rebound.   Musculoskeletal:         General: No swelling or tenderness.      Cervical back: Neck supple.      Right lower leg: No edema.      Left lower leg: No edema.   Lymphadenopathy:      Cervical: No cervical adenopathy.   Skin:     Findings: No rash.   Neurological:      General: No focal deficit " "present.      Mental Status: She is alert and oriented to person, place, and time.   Psychiatric:         Mood and Affect: Mood normal.         Behavior: Behavior normal.         Thought Content: Thought content normal.         Judgment: Judgment normal.         Lab Results:      Lab Results   Component Value Date    WBC 4.99 06/04/2024    HGB 13.4 06/04/2024    HCT 40.1 06/04/2024     06/04/2024    TRIG 83 06/04/2024    HDL 59 06/04/2024    LDLDIRECT 142 (H) 06/04/2024    ALT 18 06/04/2024    AST 17 06/04/2024    K 3.7 06/04/2024     06/04/2024    CREATININE 0.74 06/04/2024    BUN 13 06/04/2024    CO2 25 06/04/2024    TSH 3.41 11/28/2018    INR 1.1 11/21/2018    GLUF 94 06/04/2024    HGBA1C 5.6 06/04/2024     Lab Results   Component Value Date    URICACID 3.6 11/22/2019     Invalid input(s): \"BASENAME\" Vitamin D    No results found.     POCT Labs                       "

## 2024-06-06 NOTE — ASSESSMENT & PLAN NOTE
Improved on compounded Semaglutide per [facility].  Patient needs to complete a formal Weight Management program prior to coverage for Rx GLP-1 agonist.  Recommend lifestyle modifications.

## 2024-06-06 NOTE — ASSESSMENT & PLAN NOTE
Worsening due to recent medication noncompliance.  Continue Lipitor 10mg 1 pill nightly.  Advise lifestyle modifications.

## 2024-06-06 NOTE — PATIENT INSTRUCTIONS
Wellness Visit for Adults   AMBULATORY CARE:   A wellness visit  is when you see your healthcare provider to get screened for health problems. Your healthcare provider will also give you advice on how to stay healthy. Write down your questions so you remember to ask them. Ask your healthcare provider how often you should have a wellness visit.  What happens at a wellness visit:  Your healthcare provider will ask about your health, and your family history of health problems. This includes high blood pressure, heart disease, and cancer. He or she will ask if you have symptoms that concern you, if you smoke, and about your mood. You may also be asked about your intake of medicines, supplements, food, and alcohol. Any of the following may be done:  Your weight  will be checked. Your height may also be checked so your body mass index (BMI) can be calculated. Your BMI shows if you are at a healthy weight.    Your blood pressure  and heart rate will be checked. Your temperature may also be checked.    Blood and urine tests  may be done. Blood tests may be done to check your cholesterol levels. Abnormal cholesterol levels increase your risk for heart disease and stroke. You may also need a blood or urine test to check for diabetes if you are at increased risk. Urine tests may be done to look for signs of an infection or kidney disease.    A physical exam  includes checking your heartbeat and lungs with a stethoscope. Your healthcare provider may also check your skin to look for sun damage.    Screening tests  may be recommended. A screening test is done to check for diseases that may not cause symptoms. The screening tests you may need depend on your age, gender, family history, and lifestyle habits. For example, colorectal screening may be recommended if you are 50 years old or older.    Screening tests you need if you are a woman:   A Pap smear  is used to screen for cervical cancer. Pap smears are usually done every 3 to  5 years depending on your age. You may need them more often if you have had abnormal Pap smear test results in the past. Ask your healthcare provider how often you should have a Pap smear.    A mammogram  is an x-ray of your breasts to screen for breast cancer. Experts recommend mammograms every 2 years starting at age 50 years. You may need a mammogram at age 49 years or younger if you have an increased risk for breast cancer. Talk to your healthcare provider about when you should start having mammograms and how often you need them.    Vaccines you may need:   Get an influenza vaccine  every year. The influenza vaccine protects you from the flu. Several types of viruses cause the flu. The viruses change over time, so new vaccines are made each year.    Get a tetanus-diphtheria (Td) booster vaccine  every 10 years. This vaccine protects you against tetanus and diphtheria. Tetanus is a severe infection that may cause painful muscle spasms and lockjaw. Diphtheria is a severe bacterial infection that causes a thick covering in the back of your mouth and throat.    Get a human papillomavirus (HPV) vaccine  if you are female and aged 19 to 26 or male 19 to 21 and never received it. This vaccine protects you from HPV infection. HPV is the most common infection spread by sexual contact. HPV may also cause vaginal, penile, and anal cancers.    Get a pneumococcal vaccine  if you are aged 65 years or older. The pneumococcal vaccine is an injection given to protect you from pneumococcal disease. Pneumococcal disease is an infection caused by pneumococcal bacteria. The infection may cause pneumonia, meningitis, or an ear infection.    Get a shingles vaccine  if you are 60 or older, even if you have had shingles before. The shingles vaccine is an injection to protect you from the varicella-zoster virus. This is the same virus that causes chickenpox. Shingles is a painful rash that develops in people who had chickenpox or have  been exposed to the virus.    How to eat healthy:  My Plate is a model for planning healthy meals. It shows the types and amounts of foods that should go on your plate. Fruits and vegetables make up about half of your plate, and grains and protein make up the other half. A serving of dairy is included on the side of your plate. The amount of calories and serving sizes you need depends on your age, gender, weight, and height. Examples of healthy foods are listed below:  Eat a variety of vegetables  such as dark green, red, and orange vegetables. You can also include canned vegetables low in sodium (salt) and frozen vegetables without added butter or sauces.    Eat a variety of fresh fruits , canned fruit in 100% juice, frozen fruit, and dried fruit.    Include whole grains.  At least half of the grains you eat should be whole grains. Examples include whole-wheat bread, wheat pasta, brown rice, and whole-grain cereals such as oatmeal.    Eat a variety of protein foods such as seafood (fish and shellfish), lean meat, and poultry without skin (turkey and chicken). Examples of lean meats include pork leg, shoulder, or tenderloin, and beef round, sirloin, tenderloin, and extra lean ground beef. Other protein foods include eggs and egg substitutes, beans, peas, soy products, nuts, and seeds.    Choose low-fat dairy products such as skim or 1% milk or low-fat yogurt, cheese, and cottage cheese.    Limit unhealthy fats  such as butter, hard margarine, and shortening.       Exercise:  Exercise at least 30 minutes per day on most days of the week. Some examples of exercise include walking, biking, dancing, and swimming. You can also fit in more physical activity by taking the stairs instead of the elevator or parking farther away from stores. Include muscle strengthening activities 2 days each week. Regular exercise provides many health benefits. It helps you manage your weight, and decreases your risk for type 2 diabetes,  heart disease, stroke, and high blood pressure. Exercise can also help improve your mood. Ask your healthcare provider about the best exercise plan for you.       General health and safety guidelines:   Do not smoke.  Nicotine and other chemicals in cigarettes and cigars can cause lung damage. Ask your healthcare provider for information if you currently smoke and need help to quit. E-cigarettes or smokeless tobacco still contain nicotine. Talk to your healthcare provider before you use these products.    Limit alcohol.  A drink of alcohol is 12 ounces of beer, 5 ounces of wine, or 1½ ounces of liquor.    Lose weight, if needed.  Being overweight increases your risk of certain health conditions. These include heart disease, high blood pressure, type 2 diabetes, and certain types of cancer.    Protect your skin.  Do not sunbathe or use tanning beds. Use sunscreen with a SPF 15 or higher. Apply sunscreen at least 15 minutes before you go outside. Reapply sunscreen every 2 hours. Wear protective clothing, hats, and sunglasses when you are outside.    Drive safely.  Always wear your seatbelt. Make sure everyone in your car wears a seatbelt. A seatbelt can save your life if you are in an accident. Do not use your cell phone when you are driving. This could distract you and cause an accident. Pull over if you need to make a call or send a text message.    Practice safe sex.  Use latex condoms if are sexually active and have more than one partner. Your healthcare provider may recommend screening tests for sexually transmitted infections (STIs).    Wear helmets, lifejackets, and protective gear.  Always wear a helmet when you ride a bike or motorcycle, go skiing, or play sports that could cause a head injury. Wear protective equipment when you play sports. Wear a lifejacket when you are on a boat or doing water sports.    © Copyright [employer] 2023 Information is for End User's use only and may not be sold, redistributed or  otherwise used for commercial purposes.  The above information is an  only. It is not intended as medical advice for individual conditions or treatments. Talk to your doctor, nurse or pharmacist before following any medical regimen to see if it is safe and effective for you.    Weight Management   AMBULATORY CARE:   Why it is important to manage your weight:  Being overweight increases your risk of health conditions such as heart disease, high blood pressure, type 2 diabetes, and certain types of cancer. It can also increase your risk for osteoarthritis, sleep apnea, and other respiratory problems. Aim for a slow, steady weight loss. Even a small amount of weight loss can lower your risk of health problems.  Risks of being overweight:  Extra weight can cause many health problems, including the following:  Diabetes (high blood sugar level)    High blood pressure or high cholesterol    Heart disease    Stroke    Gallbladder or liver disease    Cancer of the colon, breast, prostate, liver, or kidney    Sleep apnea    Arthritis or gout    Screening  is done to check for health conditions before you have signs or symptoms. If you are 35 to 70 years old, your blood sugar level may be checked every 3 years for signs of prediabetes or diabetes. Your healthcare provider will check your blood pressure at each visit. High blood pressure can lead to a stroke or other problems. Your provider may check for signs of heart disease, cancer, or other health problems.  How to lose weight safely:  A safe and healthy way to lose weight is to eat fewer calories and get regular exercise.  You can lose up about 1 pound a week by decreasing the number of calories you eat by 500 calories each day. You can decrease calories by eating smaller portion sizes or by cutting out high-calorie foods. Read labels to find out how many calories are in the foods you eat.         You can also burn calories with exercise such as walking,  swimming, or biking. You will be more likely to keep weight off if you make these changes part of your lifestyle. Exercise at least 30 minutes per day on most days of the week. You can also fit in more physical activity by taking the stairs instead of the elevator or parking farther away from stores. Ask your healthcare provider about the best exercise plan for you.       Healthy meal plan for weight management:  A healthy meal plan includes a variety of foods, contains fewer calories, and helps you stay healthy. A healthy meal plan includes the following:     Eat whole-grain foods more often.  A healthy meal plan should contain fiber. Fiber is the part of grains, fruits, and vegetables that is not broken down by your body. Whole-grain foods are healthy and provide extra fiber in your diet. Some examples of whole-grain foods are whole-wheat breads and pastas, oatmeal, brown rice, and bulgur.    Eat a variety of vegetables every day.  Include dark, leafy greens such as spinach, kale, krishna greens, and mustard greens. Eat yellow and orange vegetables such as carrots, sweet potatoes, and winter squash.     Eat a variety of fruits every day.  Choose fresh or canned fruit (canned in its own juice or light syrup) instead of juice. Fruit juice has very little or no fiber.    Eat low-fat dairy foods.  Drink fat-free (skim) milk or 1% milk. Eat fat-free yogurt and low-fat cottage cheese. Try low-fat cheeses such as mozzarella and other reduced-fat cheeses.    Choose meat and other protein foods that are low in fat.  Choose beans or other legumes such as split peas or lentils. Choose fish, skinless poultry (chicken or turkey), or lean cuts of red meat (beef or pork). Before you cook meat or poultry, cut off any visible fat.     Use less fat and oil.  Try baking foods instead of frying them. Add less fat, such as margarine, sour cream, regular salad dressing and mayonnaise to foods. Eat fewer high-fat foods. Some examples of  high-fat foods include french fries, doughnuts, ice cream, and cakes.    Eat fewer sweets.  Limit foods and drinks that are high in sugar. This includes candy, cookies, regular soda, and sweetened drinks.  Ways to decrease calories:   Eat smaller portions.     Use a small plate with smaller servings.    Do not eat second helpings.    When you eat at a restaurant, ask for a box and place half of your meal in the box before you eat.    Share an entrée with someone else.    Replace high-calorie snacks with healthy, low-calorie snacks.     Choose fresh fruit, vegetables, fat-free rice cakes, or air-popped popcorn instead of potato chips, nuts, or chocolate.    Choose water or calorie-free drinks instead of soda or sweetened drinks.    Do not shop for groceries when you are hungry.  You may be more likely to make unhealthy food choices. Take a grocery list of healthy foods and shop after you have eaten.    Eat regular meals. Do not skip meals. Skipping meals can lead to overeating later in the day. This can make it harder for you to lose weight. Eat a healthy snack in place of a meal if you do not have time to eat a regular meal. Talk with a dietitian to help you create a meal plan and schedule that is right for you.    Other things to consider as you try to lose weight:   Be aware of situations that may give you the urge to overeat, such as eating while watching television. Find ways to avoid these situations. For example, read a book, go for a walk, or do crafts.    Meet with a weight loss support group or friends who are also trying to lose weight. This may help you stay motivated to continue working on your weight loss goals.    © Copyright [employer] 2023 Information is for End User's use only and may not be sold, redistributed or otherwise used for commercial purposes.  The above information is an  only. It is not intended as medical advice for individual conditions or treatments. Talk to your doctor,  nurse or pharmacist before following any medical regimen to see if it is safe and effective for you.    Cholesterol and Your Health   AMBULATORY CARE:   Cholesterol  is a waxy, fat-like substance. Your body uses cholesterol to make hormones and new cells, and to protect nerves. Cholesterol is made by your body. It also comes from certain foods you eat, such as meat and dairy products. Your healthcare provider can help you set goals for your cholesterol levels. Your provider can help you create a plan to meet your goals.  Cholesterol level goals:  Your cholesterol level goals depend on your risk for heart disease, your age, and your other health conditions. The following are general guidelines:  Total cholesterol  includes low-density lipoprotein (LDL), high-density lipoprotein (HDL), and triglyceride levels. The total cholesterol level should be lower than 200 mg/dL and is best at about 150 mg/dL.    LDL cholesterol  is called bad cholesterol  because it forms plaque in your arteries. As plaque builds up, your arteries become narrow, and less blood flows through. When plaque decreases blood flow to your heart, you may have chest pain. If plaque completely blocks an artery that brings blood to your heart, you may have a heart attack. Plaque can break off and form blood clots. Blood clots may block arteries in your brain and cause a stroke. The level should be less than 130 mg/dL and is best at about 100 mg/dL.         HDL cholesterol  is called good cholesterol  because it helps remove LDL cholesterol from your arteries. It does this by attaching to LDL cholesterol and carrying it to your liver. Your liver breaks down LDL cholesterol so your body can get rid of it. High levels of HDL cholesterol can help prevent a heart attack and stroke. Low levels of HDL cholesterol can increase your risk for heart disease, heart attack, and stroke. The level should be at least 40 mg/dL in males or at least 50 mg/dL in  females.    Triglycerides  are a type of fat that store energy from foods you eat. High levels of triglycerides also cause plaque buildup. This can increase your risk for a heart attack or stroke. If your triglyceride level is high, your LDL cholesterol level may also be high. The level should be less than 150 mg/dL.    Any of the following can increase your risk for high cholesterol:   Smoking or drinking large amounts of alcohol    Having overweight or obesity, or not getting enough exercise    A medical condition such as hypertension (high blood pressure) or diabetes    A family history of high cholesterol    Age older than 65    What you need to know about having your cholesterol levels checked:  Adults 20 to 45 years of age should have their cholesterol levels checked every 4 to 6 years. Adults 45 years or older should have their cholesterol checked every 1 to 2 years. You may need your cholesterol checked more often, or at a younger age, if you have risk factors for heart disease. You may also need to have your cholesterol checked more often if you have other health conditions, such as diabetes. Blood tests are used to check cholesterol levels. Blood tests measure your levels of triglycerides, LDL cholesterol, and HDL cholesterol.  How healthy fats affect your cholesterol levels:  Healthy fats, also called unsaturated fats, help lower LDL cholesterol and triglyceride levels. Healthy fats include the following:  Monounsaturated fats  are found in foods such as olive oil, canola oil, avocado, nuts, and olives.    Polyunsaturated fats,  such as omega 3 fats, are found in fish, such as salmon, trout, and tuna. They can also be found in plant foods such as flaxseed, walnuts, and soybeans.    How unhealthy fats affect your cholesterol levels:  Unhealthy fats increase LDL cholesterol and triglyceride levels. They are found in foods high in cholesterol, saturated fat, and trans fat:  Cholesterol  is found in eggs,  dairy, and meat.    Saturated fat  is found in butter, cheese, ice cream, whole milk, and coconut oil. Saturated fat is also found in meat, such as sausage, hot dogs, and bologna.    Trans fat  is found in liquid oils and is used in fried and baked foods. Foods that contain trans fats include chips, crackers, muffins, sweet rolls, microwave popcorn, and cookies.    Treatment  for high cholesterol will also decrease your risk of heart disease, heart attack, and stroke. Treatment may include any of the following:  Lifestyle changes  may include food, exercise, weight loss, and quitting smoking. You may also need to decrease the amount of alcohol you drink. Your healthcare provider will want you to start with lifestyle changes. Other treatment may be added if lifestyle changes are not enough. Your healthcare provider may recommend you work with a team to manage hyperlipidemia. The team may include medical experts such as a dietitian, an exercise or physical therapist, and a behavior therapist. Your family members may be included in helping you create lifestyle changes.    Medicines  may be given to lower your LDL cholesterol, triglyceride levels, or total cholesterol level. You may need medicines to lower your cholesterol if any of the following is true:    You have a history of stroke, TIA, unstable angina, or a heart attack.    Your LDL cholesterol level is 190 mg/dL or higher.    You are age 40 to 75 years, have diabetes or heart disease risk factors, and your LDL cholesterol is 70 mg/dL or higher.    Supplements  include fish oil, red yeast rice, and garlic. Fish oil may help lower your triglyceride and LDL cholesterol levels. It may also increase your HDL cholesterol level. Red yeast rice may help decrease your total cholesterol level and LDL cholesterol level. Garlic may help lower your total cholesterol level. Do not take any supplements without talking to your healthcare provider.    Food changes you can make  to lower your cholesterol levels:  A dietitian can help you create a healthy eating plan. Your dietitian can show you how to read food labels and choose foods low in saturated fat, trans fats, and cholesterol.     Decrease the total amount of fat you eat.  Choose lean meats, fat-free or 1% fat milk, and low-fat dairy products, such as yogurt and cheese. Try to limit or avoid red meats. Limit or do not eat fried foods or baked goods, such as cookies.    Replace unhealthy fats with healthy fats.  Cook foods in olive oil or canola oil. Choose soft margarines that are low in saturated fat and trans fat. Seeds, nuts, and avocados are other examples of healthy fats.    Eat foods with omega-3 fats.  Examples include salmon, tuna, mackerel, walnuts, and flaxseed. Eat fish 2 times per week. Pregnant women should not eat fish that have high levels of mercury, such as shark, swordfish, and rodrigo mackerel.         Increase the amount of high-fiber foods you eat.  High-fiber foods can help lower your LDL cholesterol. Aim to get between 20 and 30 grams of fiber each day. Fruits and vegetables are high in fiber. Eat at least 5 servings each day. Other high-fiber foods are whole-grain or whole-wheat breads, pastas, or cereals, and brown rice. Eat 3 ounces of whole-grain foods each day. Increase fiber slowly. You may have abdominal discomfort, bloating, and gas if you add fiber to your diet too quickly.         Eat healthy protein foods.  Examples include low-fat dairy products, skinless chicken and turkey, fish, and nuts.    Limit foods and drinks that are high in sugar.  Your dietitian or healthcare provider can help you create daily limits for high-sugar foods and drinks. The limit may be lower if you have diabetes or another health condition. Limits can also help you lose weight if needed.  Lifestyle changes you can make to lower your cholesterol levels:   Maintain a healthy weight.  Ask your healthcare provider what a healthy  weight is for you. Ask your provider to help you create a weight loss plan if needed. Weight loss can decrease your total cholesterol and triglyceride levels. Weight loss may also help keep your blood pressure at a healthy level.    Be physically active throughout the day.  Physical activity, such as exercise, can help lower your total cholesterol level and maintain a healthy weight. Physical activity can also help increase your HDL cholesterol level. Work with your healthcare provider to create an program that is right for you. Get at least 30 to 40 minutes of moderate physical activity most days of the week. Examples of exercise include brisk walking, swimming, or biking. Also include strength training at least 2 times each week. Your healthcare providers can help you create a physical activity plan.            Do not smoke.  Nicotine and other chemicals in cigarettes and cigars can raise your cholesterol levels. Ask your healthcare provider for information if you currently smoke and need help to quit. E-cigarettes or smokeless tobacco still contain nicotine. Talk to your healthcare provider before you use these products.         Limit or do not drink alcohol.  Alcohol can increase your triglyceride levels. Ask your healthcare provider before you drink alcohol. Ask how much is okay for you to drink in 24 hours or 1 week.    Follow up with your doctor as directed:  Write down your questions so you remember to ask them during your visits.  © Copyright [employer] 2023 Information is for End User's use only and may not be sold, redistributed or otherwise used for commercial purposes.  The above information is an  only. It is not intended as medical advice for individual conditions or treatments. Talk to your doctor, nurse or pharmacist before following any medical regimen to see if it is safe and effective for you.

## 2024-08-31 DIAGNOSIS — M79.7 FIBROMYALGIA: ICD-10-CM

## 2024-08-31 DIAGNOSIS — Z86.73 H/O TIA (TRANSIENT ISCHEMIC ATTACK) AND STROKE: ICD-10-CM

## 2024-09-01 RX ORDER — ATORVASTATIN CALCIUM 10 MG/1
10 TABLET, FILM COATED ORAL
Qty: 90 TABLET | Refills: 1 | Status: SHIPPED | OUTPATIENT
Start: 2024-09-01

## 2024-09-05 RX ORDER — CYCLOBENZAPRINE HCL 10 MG
10 TABLET ORAL 3 TIMES DAILY PRN
Qty: 90 TABLET | Refills: 0 | Status: SHIPPED | OUTPATIENT
Start: 2024-09-05

## 2024-12-05 DIAGNOSIS — M79.7 FIBROMYALGIA: ICD-10-CM

## 2024-12-09 RX ORDER — CYCLOBENZAPRINE HCL 10 MG
TABLET ORAL
Qty: 90 TABLET | Refills: 0 | Status: SHIPPED | OUTPATIENT
Start: 2024-12-09

## 2024-12-27 DIAGNOSIS — M79.7 FIBROMYALGIA: ICD-10-CM

## 2024-12-27 RX ORDER — CYCLOBENZAPRINE HCL 10 MG
10 TABLET ORAL 3 TIMES DAILY PRN
Qty: 90 TABLET | Refills: 0 | OUTPATIENT
Start: 2024-12-27

## 2024-12-31 ENCOUNTER — RESULTS FOLLOW-UP (OUTPATIENT)
Dept: FAMILY MEDICINE CLINIC | Facility: CLINIC | Age: 49
End: 2024-12-31

## 2024-12-31 ENCOUNTER — APPOINTMENT (OUTPATIENT)
Dept: LAB | Facility: CLINIC | Age: 49
End: 2024-12-31
Payer: COMMERCIAL

## 2024-12-31 DIAGNOSIS — E78.49 OTHER HYPERLIPIDEMIA: ICD-10-CM

## 2024-12-31 DIAGNOSIS — E66.3 OVERWEIGHT: ICD-10-CM

## 2024-12-31 DIAGNOSIS — F41.9 ANXIETY: ICD-10-CM

## 2024-12-31 LAB
ALBUMIN SERPL BCG-MCNC: 4.1 G/DL (ref 3.5–5)
ALP SERPL-CCNC: 44 U/L (ref 34–104)
ALT SERPL W P-5'-P-CCNC: 21 U/L (ref 7–52)
ANION GAP SERPL CALCULATED.3IONS-SCNC: 5 MMOL/L (ref 4–13)
AST SERPL W P-5'-P-CCNC: 18 U/L (ref 13–39)
BILIRUB SERPL-MCNC: 0.55 MG/DL (ref 0.2–1)
BUN SERPL-MCNC: 16 MG/DL (ref 5–25)
CALCIUM SERPL-MCNC: 8.9 MG/DL (ref 8.4–10.2)
CHLORIDE SERPL-SCNC: 104 MMOL/L (ref 96–108)
CHOLEST SERPL-MCNC: 150 MG/DL (ref ?–200)
CO2 SERPL-SCNC: 28 MMOL/L (ref 21–32)
CREAT SERPL-MCNC: 0.71 MG/DL (ref 0.6–1.3)
GFR SERPL CREATININE-BSD FRML MDRD: 98 ML/MIN/1.73SQ M
GLUCOSE P FAST SERPL-MCNC: 85 MG/DL (ref 65–99)
HDLC SERPL-MCNC: 60 MG/DL
LDLC SERPL CALC-MCNC: 78 MG/DL (ref 0–100)
LDLC SERPL DIRECT ASSAY-MCNC: 86 MG/DL (ref 0–100)
NONHDLC SERPL-MCNC: 90 MG/DL
POTASSIUM SERPL-SCNC: 3.8 MMOL/L (ref 3.5–5.3)
PROT SERPL-MCNC: 6.8 G/DL (ref 6.4–8.4)
SODIUM SERPL-SCNC: 137 MMOL/L (ref 135–147)
TRIGL SERPL-MCNC: 62 MG/DL (ref ?–150)
TSH SERPL DL<=0.05 MIU/L-ACNC: 1.67 UIU/ML (ref 0.45–4.5)

## 2024-12-31 PROCEDURE — 80061 LIPID PANEL: CPT

## 2024-12-31 PROCEDURE — 36415 COLL VENOUS BLD VENIPUNCTURE: CPT

## 2024-12-31 PROCEDURE — 84443 ASSAY THYROID STIM HORMONE: CPT

## 2024-12-31 PROCEDURE — 80053 COMPREHEN METABOLIC PANEL: CPT

## 2024-12-31 PROCEDURE — 83721 ASSAY OF BLOOD LIPOPROTEIN: CPT

## 2025-01-10 ENCOUNTER — OFFICE VISIT (OUTPATIENT)
Dept: FAMILY MEDICINE CLINIC | Facility: CLINIC | Age: 50
End: 2025-01-10
Payer: COMMERCIAL

## 2025-01-10 VITALS
WEIGHT: 152 LBS | OXYGEN SATURATION: 99 % | DIASTOLIC BLOOD PRESSURE: 80 MMHG | HEART RATE: 93 BPM | BODY MASS INDEX: 25.33 KG/M2 | TEMPERATURE: 96.7 F | RESPIRATION RATE: 16 BRPM | HEIGHT: 65 IN | SYSTOLIC BLOOD PRESSURE: 130 MMHG

## 2025-01-10 DIAGNOSIS — D68.61 APS (ANTIPHOSPHOLIPID SYNDROME) (HCC): ICD-10-CM

## 2025-01-10 DIAGNOSIS — Z86.73 H/O TIA (TRANSIENT ISCHEMIC ATTACK) AND STROKE: ICD-10-CM

## 2025-01-10 DIAGNOSIS — Z13.6 SCREENING FOR CARDIOVASCULAR CONDITION: ICD-10-CM

## 2025-01-10 DIAGNOSIS — E66.3 OVERWEIGHT: ICD-10-CM

## 2025-01-10 DIAGNOSIS — Z12.31 ENCOUNTER FOR SCREENING MAMMOGRAM FOR BREAST CANCER: ICD-10-CM

## 2025-01-10 DIAGNOSIS — Z23 ENCOUNTER FOR IMMUNIZATION: ICD-10-CM

## 2025-01-10 DIAGNOSIS — F41.9 ANXIETY: ICD-10-CM

## 2025-01-10 DIAGNOSIS — J30.9 ALLERGIC RHINITIS, UNSPECIFIED SEASONALITY, UNSPECIFIED TRIGGER: ICD-10-CM

## 2025-01-10 DIAGNOSIS — Z86.0100 HISTORY OF COLON POLYPS: ICD-10-CM

## 2025-01-10 DIAGNOSIS — M54.2 NECK PAIN: ICD-10-CM

## 2025-01-10 DIAGNOSIS — E78.49 OTHER HYPERLIPIDEMIA: Primary | ICD-10-CM

## 2025-01-10 DIAGNOSIS — Z13.1 DIABETES MELLITUS SCREENING: ICD-10-CM

## 2025-01-10 DIAGNOSIS — M79.7 FIBROMYALGIA: ICD-10-CM

## 2025-01-10 DIAGNOSIS — M79.10 MYALGIA: ICD-10-CM

## 2025-01-10 PROCEDURE — 99214 OFFICE O/P EST MOD 30 MIN: CPT | Performed by: FAMILY MEDICINE

## 2025-01-10 PROCEDURE — 90673 RIV3 VACCINE NO PRESERV IM: CPT

## 2025-01-10 PROCEDURE — 90471 IMMUNIZATION ADMIN: CPT

## 2025-01-10 RX ORDER — ATORVASTATIN CALCIUM 10 MG/1
10 TABLET, FILM COATED ORAL
Qty: 90 TABLET | Refills: 1 | Status: SHIPPED | OUTPATIENT
Start: 2025-01-10

## 2025-01-10 NOTE — ASSESSMENT & PLAN NOTE
Stable. Continue Aspirin, Lipitor 10mg 1 pill nightly. Advise lifestyle modifications.     Orders:  •  Lipid panel; Future  •  LDL cholesterol, direct; Future  •  atorvastatin (LIPITOR) 10 mg tablet; Take 1 tablet (10 mg total) by mouth daily at bedtime

## 2025-01-10 NOTE — ASSESSMENT & PLAN NOTE
Stable on Flexeril 10 mg TID P.r.n. To use sparingly-side effects discussed and Mobic 15mg QD PRN.    Encouraged increased exercise, stress relief, massage, and aqua therapy as patient does not want to take any medications that would alter her neurotransmitters.     Orders:  •  Vitamin D 25 hydroxy; Future

## 2025-01-10 NOTE — ASSESSMENT & PLAN NOTE
Stable.  Continue Lipitor 10mg 1 pill nightly. Advise lifestyle modifications.     Orders:  •  CBC and differential; Future  •  Comprehensive metabolic panel; Future  •  Lipid panel; Future  •  TSH, 3rd generation with Free T4 reflex; Future  •  LDL cholesterol, direct; Future

## 2025-01-10 NOTE — PROGRESS NOTES
Assessment/Plan:  Assessment & Plan  Other hyperlipidemia    Stable.  Continue Lipitor 10mg 1 pill nightly. Advise lifestyle modifications.     Orders:  •  CBC and differential; Future  •  Comprehensive metabolic panel; Future  •  Lipid panel; Future  •  TSH, 3rd generation with Free T4 reflex; Future  •  LDL cholesterol, direct; Future    APS (antiphospholipid syndrome) (HCC)    Stable. Continue ASA.          Anxiety    Stable s Cymbalta 60 mg daily.    Orders:  •  TSH, 3rd generation with Free T4 reflex; Future    Fibromyalgia    Stable on Flexeril 10 mg TID P.r.n. To use sparingly-side effects discussed and Mobic 15mg QD PRN.    Encouraged increased exercise, stress relief, massage, and aqua therapy as patient does not want to take any medications that would alter her neurotransmitters.     Orders:  •  Vitamin D 25 hydroxy; Future    Neck pain    Stable status post wean Cymbalta 60 mg daily. Continue Mobic 15mg QD PRN.          Allergic rhinitis, unspecified seasonality, unspecified trigger    Stable on Zyrtec p.r.n.         H/O TIA (transient ischemic attack) and stroke    Stable. Continue Aspirin, Lipitor 10mg 1 pill nightly. Advise lifestyle modifications.     Orders:  •  Lipid panel; Future  •  LDL cholesterol, direct; Future  •  atorvastatin (LIPITOR) 10 mg tablet; Take 1 tablet (10 mg total) by mouth daily at bedtime    Overweight    Improved on compounded Semaglutide per [facility].  Patient needs to complete a formal Weight Management program prior to coverage for Rx GLP-1 agonist.  Recommend lifestyle modifications.      Orders:  •  TSH, 3rd generation with Free T4 reflex; Future    History of colon polyps    Colonoscopy is up-to-date.         Diabetes mellitus screening    Orders:  •  Hemoglobin A1C; Future    Screening for cardiovascular condition    Orders:  •  CBC and differential; Future  •  Comprehensive metabolic panel; Future  •  Lipid panel; Future  •  LDL cholesterol, direct;  Future    Myalgia    Orders:  •  Vitamin D 25 hydroxy; Future    Encounter for immunization    Orders:  •  influenza vaccine, recombinant, PF, 0.5 mL IM (Flublok)    Encounter for screening mammogram for breast cancer    Orders:  •  Mammo screening bilateral w 3d and cad; Future          Return in about 6 months (around 7/10/2025) for Physical / 6mo- HL, Anxiety, FM, CVA, TIA, s/p PFO, AR, Labs.      Future Appointments   Date Time Provider Department Center   7/11/2025 10:40 AM Joie Logan, DO [facility]          Subjective:     Gema is a 49 y.o. female who presents today for a follow-up on her chronic medical conditions.        HPI:  Chief Complaint   Patient presents with   • Follow-up     6 mo     -- Above per clinical staff and reviewed. --      HPI      Today:      Return in about 6 months (around 12/6/2024) for 6mo- HL, Anxiety, FM, CVA, TIA, s/p PFO, AR, Labs.     6mo OV     Overweight - Management per [facility].  Taking Compounded Semaglutide 0.5mg SC weekly.  Watching diet - eating less sugar and carbs.  +Exercise - Walking for 20 minutes, 4-5 days per week.      Hyperlipidemia - On Lipitor 10mg QHS .       Fibromyalgia / Arthralgias / Myalgias - She is feeling well since D/C Cymbalta - sleep improved, regular BM, no longer has night sweats or vivid dreams.  Using Flexeril 10mg TID PRN c benefit - taking 3 times nights per week during the work week.  Myalgias worse c cold weather and heavy lifting at work.  She is using Mobic 15mg QD PRN a 1-2 times per week.       She weaned off Cymbalta 60mg QD due to:      1.  Sleep issues - choppy sleep, no longer has crazy dreams, no longer talking in sleep.  She feels less wired.  She now feels tired at bedtime.  She is able to fall asleep easily, has not awoke in the past week, except last night x 1 for nocturia.  Sleeping 6-7 hours per night.  She can sleep in when her schedule allows.  Feels well rested.        2.  Intermittent  achiness - Still achy in shoulders, which has been worsning since she weaned Cymbalta.  She has been using more Advil 800mg 1-2 times per day and Tylenol 1000 1-2 times daily as needed c benefit.  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      3.  Sexual dysfunction - Low libido, decreased vaginal sensation.  She has not been sexually active recently since D/C Cymbalta.        Has neck, shoulder, thoracic pain.  Clicking in her spine.  Denies trauma.  Denies joint redness, swelling, warmth in joint.  Using CBD cream helpful.           Headaches / Burning Neck Pain - Saw Ortho Dr. Grove, s/p PT. Resolved.   Intermittent symptoms of neck pain c weather change.   Previously using Voltaren Gel c benefit when Tylenol / Motrin unhelpful.  HA improved since PFO repaired.      Anxiety - Self-weaned Cymbalta 60mg QD as above and Zoloft 50mg QD  due to gaining weight. She is taking CBD oil instead since . Mood is stable, sleep is improved. No SI/HI/AH/VH.  Feels safe at home.  Drinking 12oz coffee daily.          PHQ-2/9 Depression Screening    Little interest or pleasure in doing things: 0 - not at all  Feeling down, depressed, or hopeless: 0 - not at all  Trouble falling or staying asleep, or sleeping too much: 0 - not at all  Feeling tired or having little energy: 0 - not at all  Poor appetite or overeatin - not at all  Feeling bad about yourself - or that you are a failure or have let yourself or your family down: 0 - not at all  Trouble concentrating on things, such as reading the newspaper or watching television: 0 - not at all  Moving or speaking so slowly that other people could have noticed. Or the opposite - being so fidgety or restless that you have been moving around a lot more than usual: 0 - not at all  Thoughts that you would be better off dead, or of hurting yourself in some way: 0 - not at all  PHQ-2 Score: 0  PHQ-2 Interpretation: Negative depression screen  PHQ-9 Score:  0  PHQ-9 Interpretation: No or Minimal depression       ALYCE-7 Flowsheet Screening    Flowsheet Row Most Recent Value   Over the last two weeks, how often have you been bothered by the following problems?     Feeling nervous, anxious, or on edge 0   Not being able to stop or control worrying 0   Worrying too much about different things 0   Trouble relaxing  0   Being so restless that it's hard to sit still 0   Becoming easily annoyed or irritable  0   Feeling afraid as if something awful might happen 0   How difficult have these problems made it for you to do your work, take care of things at home, or get along with other people?  Not difficult at all   ALYCE Score  0           Discolored Fingernails - Symptoms for months, unchanged.  Left 4th toe, Right 3rd and 4th finger with hyperpigmented vertical stripe.  She has been wearing acrylic nails for a long time.  Denies itching, pain, bleeding.  Derm thinks benign hyperpigmentation.  Next appt 12/23 - Overdue.     Constipation - Management per GI Dr. Jaiyeola.  Next appt PRN?  Advises Miralax PRN, which patient has not tried.  Chronic. Improved c dietary changes.  Has BM daily BM (prevoiusly 2-3) times per week.  No longer has nugget stools. Sometimes has stomach twisting. Uses Bentyl PRN rarely - last used 2019. Uses Laxatives - OTC store brand PRN - 3 times per month. She states stool softeners not helpful.        Antiphospholipid Syndrome / CVA 4/12/17, TIA 7/19/17 -   +CVA on MRI.  +ELIZABETH c Biatrial PFO.  Neuro started ASA 81mg and Lipitor 40mg QHS.  Thrombotic risk panel showed - Cardiolipin IgM positive, which Neuro believes is a transient finding.  Neuro plans to repeat Cardiolipin IgM 7/17 - repeated 9/7/17 and again positive.  Next Neuro appt 5/19 / PRN.  s/p PFO closure 4/25/17 c Cardio Dr. Spear.  s/p thrombolytic  at [facility] 2/4/18 c Dr. Zuluaga - Advises ASA 81mg QD and possible Holter / Loop - pt will discuss c Neuro. Neuro Dr. Michele declined  recommendation per patient.       TIA 4/19/17- Pt returned to ER with funny feeling in head and LUE (not RUE) numbness.  MRI revealed no evidence of CVA.  Neuro and Cardio advised Plavix for at least 6 months and ASA 81mg for life.  I/P team decreased Lipitor 40mg 1/2 pill QHS as pt was experiencing hair loss.  She has been taking Lipitor 10mg QHS regularly.        APS - s/p Neuro input.  Currently on ASA, D/C Plavix.             AR - Stable on Zyrtec PRN.     H/O Colon Polyps - Last colonoscopy 6/9/23 - repeat in 7 years - 6/9/30.    She quit Vaping.      Reviewed:   Labs 12/31/24, Ortho 12/11/17, Cardio 11/8/17, Rheum 10/16/17, Heme/Onc 10/4/17, Neuro 5/31/18       Sees Gyn Dr. Michelle Amaya yearly.  Next appt 5/24 - Overdue.  Next Pap due 2026.     The following portions of the patient's history were reviewed and updated as appropriate: allergies, current medications, past family history, past medical history, past social history, past surgical history and problem list.      Review of Systems   Constitutional:  Negative for appetite change, chills, diaphoresis, fatigue and fever.   Respiratory:  Negative for chest tightness and shortness of breath.    Cardiovascular:  Negative for chest pain.   Gastrointestinal:  Negative for abdominal pain, blood in stool, constipation, diarrhea, nausea and vomiting.   Genitourinary:  Negative for dysuria.        Current Outpatient Medications   Medication Sig Dispense Refill   • ascorbic acid (VITAMIN C) 500 mg tablet Take 500 mg by mouth daily     • aspirin 81 MG tablet Take 81 mg by mouth daily     • atorvastatin (LIPITOR) 10 mg tablet Take 1 tablet (10 mg total) by mouth daily at bedtime 90 tablet 1   • calcium carbonate (OS-MOMO) 600 MG tablet Take 600 mg by mouth daily     • cetirizine (ZyrTEC) 10 mg tablet Take 10 mg by mouth daily as needed      • Cholecalciferol 25 MCG (1000 UT) capsule Take 2,000 Units by mouth daily     • Coenzyme Q10 300 MG CAPS Take 300 mg by mouth  "daily     • cyanocobalamin (VITAMIN B-12) 100 mcg tablet Take 1 tablet by mouth daily     • cyclobenzaprine (FLEXERIL) 10 mg tablet TAKE 1 TABLET 3 TIMES A DAYAS NEEDED FOR MUSCLE SPASMS 90 tablet 0   • dicyclomine (BENTYL) 20 mg tablet Take 20 mg by mouth every 6 (six) hours as needed     • ferrous sulfate 325 (65 Fe) mg tablet Take 325 mg by mouth every other day      • Magnesium Gluconate 550 MG TABS Take 550 mg by mouth daily      • meloxicam (MOBIC) 15 mg tablet Take 1 tablet (15 mg total) by mouth daily as needed for moderate pain 90 tablet 1   • Multiple Vitamin (TAB-A-CORWIN) TABS Take 1 tablet by mouth daily     • Omega-3 Fatty Acids (FISH OIL) 1,000 mg Take 1,000 mg by mouth daily     • SEMAGLUTIDE-WEIGHT MANAGEMENT SC Inject 0.5 mg under the skin once a week Management per [facility]     • zinc gluconate 50 mg tablet Take 50 mg by mouth daily       No current facility-administered medications for this visit.       Objective:  /80 (BP Location: Left arm, Patient Position: Sitting, Cuff Size: Standard)   Pulse 93   Temp (!) 96.7 °F (35.9 °C) (Tympanic)   Resp 16   Ht 5' 5\" (1.651 m)   Wt 68.9 kg (152 lb)   SpO2 99%   BMI 25.29 kg/m²    Wt Readings from Last 3 Encounters:   01/10/25 68.9 kg (152 lb)   06/06/24 78.6 kg (173 lb 3.2 oz)   11/10/23 81.3 kg (179 lb 3.2 oz)      BP Readings from Last 3 Encounters:   01/10/25 130/80   06/06/24 128/84   11/10/23 138/82          Physical Exam  Vitals and nursing note reviewed.   Constitutional:       General: She is not in acute distress.     Appearance: Normal appearance. She is well-developed. She is not ill-appearing or toxic-appearing.   HENT:      Head: Normocephalic and atraumatic.   Eyes:      Conjunctiva/sclera: Conjunctivae normal.   Neck:      Thyroid: No thyromegaly.   Cardiovascular:      Rate and Rhythm: Normal rate and regular rhythm.      Pulses: Normal pulses.      Heart sounds: Normal heart sounds.   Pulmonary:      Effort: " "Pulmonary effort is normal.      Breath sounds: Normal breath sounds.   Abdominal:      General: Abdomen is flat. Bowel sounds are normal. There is no distension.      Palpations: Abdomen is soft. There is no mass.      Tenderness: There is no abdominal tenderness. There is no guarding or rebound.   Musculoskeletal:         General: No swelling or tenderness.      Cervical back: Neck supple.      Right lower leg: No edema.      Left lower leg: No edema.   Lymphadenopathy:      Cervical: No cervical adenopathy.   Neurological:      General: No focal deficit present.      Mental Status: She is alert and oriented to person, place, and time. Mental status is at baseline.   Psychiatric:         Mood and Affect: Mood normal.         Behavior: Behavior normal.         Thought Content: Thought content normal.         Judgment: Judgment normal.         Lab Results:      Lab Results   Component Value Date    WBC 4.99 06/04/2024    HGB 13.4 06/04/2024    HCT 40.1 06/04/2024     06/04/2024    TRIG 62 12/31/2024    HDL 60 12/31/2024    LDLDIRECT 86 12/31/2024    ALT 21 12/31/2024    AST 18 12/31/2024    K 3.8 12/31/2024     12/31/2024    CREATININE 0.71 12/31/2024    BUN 16 12/31/2024    CO2 28 12/31/2024    TSH 3.41 11/28/2018    INR 1.1 11/21/2018    GLUF 85 12/31/2024    HGBA1C 5.6 06/04/2024     Lab Results   Component Value Date    URICACID 3.6 11/22/2019     Invalid input(s): \"BASENAME\" Vitamin D    No results found.     POCT Labs                       "

## 2025-01-10 NOTE — ASSESSMENT & PLAN NOTE
Stable status post wean Cymbalta 60 mg daily. Continue Mobic 15mg QD PRN.         Speaking Coherently/Age appropriate

## 2025-01-10 NOTE — ASSESSMENT & PLAN NOTE
Improved on compounded Semaglutide per [facility].  Patient needs to complete a formal Weight Management program prior to coverage for Rx GLP-1 agonist.  Recommend lifestyle modifications.      Orders:  •  TSH, 3rd generation with Free T4 reflex; Future

## 2025-02-11 NOTE — RESULT ENCOUNTER NOTE
Normal mammogram   Advised monthly self-breast exams  Repeat in 1 year      Message sent to patient via [facility] patient portal  OA left knee

## 2025-03-01 DIAGNOSIS — Z86.73 H/O TIA (TRANSIENT ISCHEMIC ATTACK) AND STROKE: ICD-10-CM

## 2025-03-01 DIAGNOSIS — M79.7 FIBROMYALGIA: ICD-10-CM

## 2025-03-04 RX ORDER — CYCLOBENZAPRINE HCL 10 MG
TABLET ORAL
Qty: 90 TABLET | Refills: 0 | OUTPATIENT
Start: 2025-03-04

## 2025-03-04 RX ORDER — ATORVASTATIN CALCIUM 10 MG/1
10 TABLET, FILM COATED ORAL
Qty: 90 TABLET | Refills: 0 | OUTPATIENT
Start: 2025-03-04

## 2025-03-05 NOTE — TELEPHONE ENCOUNTER
Rx declined as should not be needed.    Lipitor 10mg #90 x 1 sent 1/10/25 - has enough Rx until 7/10/25.    Flexeril 10mg TID PRN - pt was using 3 times weekly.  This was sent 12/9/24 and should last 30 weeks / 7 months.      Please contact patient.

## 2025-03-11 ENCOUNTER — TELEPHONE (OUTPATIENT)
Dept: FAMILY MEDICINE CLINIC | Facility: CLINIC | Age: 50
End: 2025-03-11

## (undated) DEVICE — DRAPE EQUIPMENT RF WAND

## (undated) DEVICE — CYSTO TUBING SINGLE IRRIGATION

## (undated) DEVICE — 2000CC GUARDIAN II: Brand: GUARDIAN

## (undated) DEVICE — SCD SEQUENTIAL COMPRESSION COMFORT SLEEVE MEDIUM KNEE LENGTH: Brand: KENDALL SCD

## (undated) DEVICE — GLOVE PI ULTRA TOUCH SZ.7.0

## (undated) DEVICE — IV EXTENSION TUBING 33 IN

## (undated) DEVICE — STERILE MINERVA DISPOSABLE HANDPIECECONTENTS:(1) ONE SINGLE USE STERILE MINERVA ES DISPOSABLE HANDPIECE (1) ONE SINGLE USE STERILE SYRINGE(1) ONE SINGLE USE STERILE 8MM HEGAR DILATOR(1) ONE SINGLE USE NON-STERILE DESICCANT(1) ONE NON-STERILE HANDPIECE INSTRUCTIONS FOR USE(1)  ONE NON-STERILE DILATOR INSTRUCTIONS FOR USE: Brand: MINERVA SINGLE STERILE DISPOSABLE HANDPIECE

## (undated) DEVICE — STRL ALLENTOWN HYSTEROSCOPY PK: Brand: CARDINAL HEALTH

## (undated) DEVICE — GLOVE PI ULTRA TOUCH SZ.7.5

## (undated) DEVICE — PVC URETHRAL CATHETER: Brand: DOVER

## (undated) DEVICE — UNDER BUTTOCKS DRAPE W/FLUID CONTROL POUCH: Brand: CONVERTORS

## (undated) DEVICE — PREMIUM DRY TRAY LF: Brand: MEDLINE INDUSTRIES, INC.

## (undated) DEVICE — GLOVE INDICATOR PI UNDERGLOVE SZ 7 BLUE